# Patient Record
Sex: MALE | Race: WHITE | NOT HISPANIC OR LATINO | Employment: OTHER
[De-identification: names, ages, dates, MRNs, and addresses within clinical notes are randomized per-mention and may not be internally consistent; named-entity substitution may affect disease eponyms.]

---

## 2019-12-09 ENCOUNTER — HEALTH MAINTENANCE LETTER (OUTPATIENT)
Age: 69
End: 2019-12-09

## 2020-03-15 ENCOUNTER — HEALTH MAINTENANCE LETTER (OUTPATIENT)
Age: 70
End: 2020-03-15

## 2021-03-19 ENCOUNTER — IMMUNIZATION (OUTPATIENT)
Dept: NURSING | Facility: CLINIC | Age: 71
End: 2021-03-19
Payer: COMMERCIAL

## 2021-03-19 PROCEDURE — 0001A PR COVID VAC PFIZER DIL RECON 30 MCG/0.3 ML IM: CPT

## 2021-03-19 PROCEDURE — 91300 PR COVID VAC PFIZER DIL RECON 30 MCG/0.3 ML IM: CPT

## 2021-04-09 ENCOUNTER — IMMUNIZATION (OUTPATIENT)
Dept: NURSING | Facility: CLINIC | Age: 71
End: 2021-04-09
Attending: NURSE PRACTITIONER
Payer: COMMERCIAL

## 2021-04-09 PROCEDURE — 91300 PR COVID VAC PFIZER DIL RECON 30 MCG/0.3 ML IM: CPT

## 2021-04-09 PROCEDURE — 0002A PR COVID VAC PFIZER DIL RECON 30 MCG/0.3 ML IM: CPT

## 2022-09-30 ENCOUNTER — APPOINTMENT (OUTPATIENT)
Dept: CT IMAGING | Facility: CLINIC | Age: 72
End: 2022-09-30
Attending: EMERGENCY MEDICINE
Payer: COMMERCIAL

## 2022-09-30 ENCOUNTER — HOSPITAL ENCOUNTER (EMERGENCY)
Facility: CLINIC | Age: 72
Discharge: HOME | End: 2022-09-30
Attending: EMERGENCY MEDICINE | Admitting: EMERGENCY MEDICINE
Payer: COMMERCIAL

## 2022-09-30 VITALS
TEMPERATURE: 98.2 F | RESPIRATION RATE: 20 BRPM | HEART RATE: 96 BPM | DIASTOLIC BLOOD PRESSURE: 63 MMHG | WEIGHT: 139 LBS | OXYGEN SATURATION: 100 % | BODY MASS INDEX: 19.9 KG/M2 | SYSTOLIC BLOOD PRESSURE: 130 MMHG | HEIGHT: 70 IN

## 2022-09-30 DIAGNOSIS — D64.9 ANEMIA, UNSPECIFIED TYPE: ICD-10-CM

## 2022-09-30 DIAGNOSIS — D69.6 THROMBOCYTOPENIA (H): ICD-10-CM

## 2022-09-30 DIAGNOSIS — N28.9 RENAL INSUFFICIENCY: ICD-10-CM

## 2022-09-30 DIAGNOSIS — K56.41 FECAL IMPACTION IN RECTUM (H): ICD-10-CM

## 2022-09-30 DIAGNOSIS — N39.0 URINARY TRACT INFECTION WITHOUT HEMATURIA, SITE UNSPECIFIED: ICD-10-CM

## 2022-09-30 LAB
ALBUMIN SERPL-MCNC: 2.1 G/DL (ref 3.4–5)
ALBUMIN UR-MCNC: 50 MG/DL
ALP SERPL-CCNC: 165 U/L (ref 40–150)
ALT SERPL W P-5'-P-CCNC: 37 U/L (ref 0–70)
ANION GAP SERPL CALCULATED.3IONS-SCNC: 8 MMOL/L (ref 3–14)
APPEARANCE UR: ABNORMAL
AST SERPL W P-5'-P-CCNC: 38 U/L (ref 0–45)
ATRIAL RATE - MUSE: 97 BPM
BASOPHILS # BLD AUTO: 0.1 10E3/UL (ref 0–0.2)
BASOPHILS NFR BLD AUTO: 0 %
BILIRUB SERPL-MCNC: 0.7 MG/DL (ref 0.2–1.3)
BILIRUB UR QL STRIP: NEGATIVE
BUN SERPL-MCNC: 35 MG/DL (ref 7–30)
CALCIUM SERPL-MCNC: 8.9 MG/DL (ref 8.5–10.1)
CHLORIDE BLD-SCNC: 98 MMOL/L (ref 94–109)
CO2 SERPL-SCNC: 25 MMOL/L (ref 20–32)
COLOR UR AUTO: ABNORMAL
CREAT SERPL-MCNC: 1.52 MG/DL (ref 0.66–1.25)
DIASTOLIC BLOOD PRESSURE - MUSE: NORMAL MMHG
EOSINOPHIL # BLD AUTO: 0 10E3/UL (ref 0–0.7)
EOSINOPHIL NFR BLD AUTO: 0 %
ERYTHROCYTE [DISTWIDTH] IN BLOOD BY AUTOMATED COUNT: 14.5 % (ref 10–15)
GFR SERPL CREATININE-BSD FRML MDRD: 48 ML/MIN/1.73M2
GLUCOSE BLD-MCNC: 91 MG/DL (ref 70–99)
GLUCOSE UR STRIP-MCNC: NEGATIVE MG/DL
HCT VFR BLD AUTO: 31.1 % (ref 40–53)
HGB BLD-MCNC: 9.6 G/DL (ref 13.3–17.7)
HGB UR QL STRIP: ABNORMAL
HOLD SPECIMEN: NORMAL
IMM GRANULOCYTES # BLD: 0.2 10E3/UL
IMM GRANULOCYTES NFR BLD: 1 %
INR PPP: 1.2 (ref 0.85–1.15)
INTERPRETATION ECG - MUSE: NORMAL
KETONES UR STRIP-MCNC: NEGATIVE MG/DL
LEUKOCYTE ESTERASE UR QL STRIP: ABNORMAL
LYMPHOCYTES # BLD AUTO: 2.2 10E3/UL (ref 0.8–5.3)
LYMPHOCYTES NFR BLD AUTO: 11 %
MCH RBC QN AUTO: 27.1 PG (ref 26.5–33)
MCHC RBC AUTO-ENTMCNC: 30.9 G/DL (ref 31.5–36.5)
MCV RBC AUTO: 88 FL (ref 78–100)
MONOCYTES # BLD AUTO: 1.1 10E3/UL (ref 0–1.3)
MONOCYTES NFR BLD AUTO: 6 %
NEUTROPHILS # BLD AUTO: 15.6 10E3/UL (ref 1.6–8.3)
NEUTROPHILS NFR BLD AUTO: 82 %
NITRATE UR QL: NEGATIVE
NRBC # BLD AUTO: 0 10E3/UL
NRBC BLD AUTO-RTO: 0 /100
P AXIS - MUSE: 39 DEGREES
PH UR STRIP: 6 [PH] (ref 5–7)
PLATELET # BLD AUTO: 620 10E3/UL (ref 150–450)
POTASSIUM BLD-SCNC: 3.7 MMOL/L (ref 3.4–5.3)
PR INTERVAL - MUSE: 172 MS
PROT SERPL-MCNC: 8.2 G/DL (ref 6.8–8.8)
QRS DURATION - MUSE: 88 MS
QT - MUSE: 372 MS
QTC - MUSE: 472 MS
R AXIS - MUSE: 71 DEGREES
RBC # BLD AUTO: 3.54 10E6/UL (ref 4.4–5.9)
RBC URINE: 9 /HPF
SODIUM SERPL-SCNC: 131 MMOL/L (ref 133–144)
SP GR UR STRIP: 1.01 (ref 1–1.03)
SYSTOLIC BLOOD PRESSURE - MUSE: NORMAL MMHG
T AXIS - MUSE: 66 DEGREES
UROBILINOGEN UR STRIP-MCNC: NORMAL MG/DL
VENTRICULAR RATE- MUSE: 97 BPM
WBC # BLD AUTO: 19.2 10E3/UL (ref 4–11)
WBC CLUMPS #/AREA URNS HPF: PRESENT /HPF
WBC URINE: >182 /HPF

## 2022-09-30 PROCEDURE — 87106 FUNGI IDENTIFICATION YEAST: CPT | Performed by: EMERGENCY MEDICINE

## 2022-09-30 PROCEDURE — 250N000011 HC RX IP 250 OP 636: Performed by: EMERGENCY MEDICINE

## 2022-09-30 PROCEDURE — 36415 COLL VENOUS BLD VENIPUNCTURE: CPT | Performed by: EMERGENCY MEDICINE

## 2022-09-30 PROCEDURE — 80053 COMPREHEN METABOLIC PANEL: CPT | Performed by: EMERGENCY MEDICINE

## 2022-09-30 PROCEDURE — 85025 COMPLETE CBC W/AUTO DIFF WBC: CPT | Performed by: EMERGENCY MEDICINE

## 2022-09-30 PROCEDURE — 85610 PROTHROMBIN TIME: CPT | Performed by: EMERGENCY MEDICINE

## 2022-09-30 PROCEDURE — 82040 ASSAY OF SERUM ALBUMIN: CPT | Performed by: EMERGENCY MEDICINE

## 2022-09-30 PROCEDURE — 99285 EMERGENCY DEPT VISIT HI MDM: CPT | Mod: 25

## 2022-09-30 PROCEDURE — 250N000013 HC RX MED GY IP 250 OP 250 PS 637: Performed by: EMERGENCY MEDICINE

## 2022-09-30 PROCEDURE — 74176 CT ABD & PELVIS W/O CONTRAST: CPT

## 2022-09-30 PROCEDURE — 93005 ELECTROCARDIOGRAM TRACING: CPT

## 2022-09-30 PROCEDURE — 81001 URINALYSIS AUTO W/SCOPE: CPT | Performed by: EMERGENCY MEDICINE

## 2022-09-30 PROCEDURE — 96365 THER/PROPH/DIAG IV INF INIT: CPT

## 2022-09-30 PROCEDURE — 87086 URINE CULTURE/COLONY COUNT: CPT | Performed by: EMERGENCY MEDICINE

## 2022-09-30 RX ORDER — PANTOPRAZOLE SODIUM 40 MG/1
40 TABLET, DELAYED RELEASE ORAL DAILY
Qty: 30 TABLET | Refills: 0 | Status: SHIPPED | OUTPATIENT
Start: 2022-09-30 | End: 2022-10-30

## 2022-09-30 RX ORDER — CEFTRIAXONE 1 G/1
1 INJECTION, POWDER, FOR SOLUTION INTRAMUSCULAR; INTRAVENOUS ONCE
Status: COMPLETED | OUTPATIENT
Start: 2022-09-30 | End: 2022-09-30

## 2022-09-30 RX ORDER — CEFUROXIME AXETIL 500 MG/1
500 TABLET ORAL 2 TIMES DAILY
Qty: 20 TABLET | Refills: 0 | Status: SHIPPED | OUTPATIENT
Start: 2022-09-30 | End: 2022-10-22

## 2022-09-30 RX ADMIN — CEFTRIAXONE SODIUM 1 G: 1 INJECTION, POWDER, FOR SOLUTION INTRAMUSCULAR; INTRAVENOUS at 15:28

## 2022-09-30 RX ADMIN — DOCUSATE SODIUM 226 ML: 50 LIQUID ORAL at 15:08

## 2022-09-30 ASSESSMENT — ACTIVITIES OF DAILY LIVING (ADL)
ADLS_ACUITY_SCORE: 35
ADLS_ACUITY_SCORE: 35

## 2022-09-30 NOTE — ED PROVIDER NOTES
Visit Date: 09/30/2022    CHIEF COMPLAINT:  Low hemoglobin.    HISTORY OF PRESENT ILLNESS:  This is a 72-year-old male who presents to the ED on the advice of his primary care.  The patient has not been feeling good for the past couple of weeks.  He is noted to have some tarry-like stools, although when I asked him what these were like, he said mostly they were dark brown, they really were not black or shiny and there was no blood at any place.  He had a hemoglobin done by his primary care's office several weeks, if not months, ago, which was 12, and then when he saw her again today it was 9.  He denies any black or bloody stools.  He states he has been losing weight; however, in the past 6-8 months up to 20 pounds and he has also become a diabetic and requires insulin.  He is not having vomiting, but he is not taking much in.  He denies abdominal pain or discomfort.  He has noticed, though, that he seems to have a lot of loose stool as well as lots of difficulty controlling his urine and that seems to be new.  He has no back pain with this and he has no fever.    PAST MEDICAL HISTORY:  He has the diabetes, as noted; he has had cataracts.    MEDICATIONS:  Include Neurontin and aspirin.    ALLERGIES:  SULFA.    FAMILY AND SOCIAL HISTORY:  He does occasionally drink.  He does not smoke.    REVIEW OF SYSTEMS:  As noted with all other systems negative.    PHYSICAL EXAMINATION:    GENERAL:  Exam reveals a somewhat thin white male, supine, no respiratory distress.  VITAL SIGNS:  Blood pressure 100/50, pulse 94, temperature 98, respirations 20, pulse ox 100% on room air.  HEENT:  Pupils equal, reactive.  Extraocular movements intact.  Nares and oropharynx clear.  NECK:  The neck veins are flat.  LUNGS:  Clear to auscultation.  HEART:  Heart sounds are regular.  No murmur, rubs or gallops are appreciated.  ABDOMEN:  Soft, although the bladder does feel somewhat distended.  There is no guarding or rebound.  There are 2+  femoral pulses.  RECTAL:  Brown stool.  On digital exam, there is a large mass of stool in the rectum to the point of impaction.  There is reasonable tone.  I cannot appreciate the prostate.  MUSCULOSKELETAL:  No swelling or tenderness to the legs.  NEUROLOGIC:  Awake, alert and appropriate.  Fluent speech.  Normal motor sensation and coordination.  SKIN:  No rash.    EMERGENCY DEPARTMENT COURSE:  The patient was placed on monitors.  EKG was obtained, which shows sinus rhythm with premature atrial beats.  No acute ischemic changes, rate 97, , QRS 88 and QTc is 472.  Labs:  Sodium 131, BUN 35, creatinine 1.52, alkaline phosphatase 165.  White count 19, hemoglobin 9.6.  Platelet is 620.  Urine reveals white cell clumps, large leukocyte esterase, greater than 182 white cells.  The patient did go for a CT scan, which showed a moderate amount of stool with a large bolus in the rectum, some bilateral hydroureter and hydronephrosis with transition at the urinary bladder.  The bladder was thickened and, as noted, a small pulmonary nodule is also seen.  I did have the nurses do a bladder scan and they read over 500 and they put a catheter in and got a large amount of urine out.  In addition, the patient was given an enema and was able to pass a large amount of stool. Both his bladder and urine seem to be much improved than on arrival. Given the leukocytosis in the urine, Rocephin was given for probable infection.  The patient per labs does show renal insufficiency and anemia and, indeed, if this has gone from 12 to 9, this is concerning, although his MCV is 88.  The elevated platelet count could be consistent with GI bleeding or even malignancy.  The patient does not require transfusion and does not show any active rectal bleeding at this time.  He received IV fluids and he is able to drink water and ambulate.  I have discussed all these labs with him and he will need close followup.  We will start him on Ceftin 500  twice a day as well as Protonix 40 mg a day.  I have recommended followup with both his primary and with GI and have referred him to Dr. Salinas for possible upper GI endoscopy.    IMPRESSION:  Urinary tract infection, anemia, thrombocytopenia, renal insufficiency and rectal impaction.    Adithya Mclean MD        D: 2022   T: 2022   MT: [id number]    Name:     RONALD JONES  MRN:      [id number]        Account:    [id number]   :      1950           Visit Date: 2022     Document: [id number]

## 2022-09-30 NOTE — ED TRIAGE NOTES
Pt reports few weeks of tar like stools. Pt was told to come to ED for internal bleeding     Triage Assessment     Row Name 09/30/22 1023       Triage Assessment (Adult)    Airway WDL WDL       Respiratory WDL    Respiratory WDL WDL       Skin Circulation/Temperature WDL    Skin Circulation/Temperature WDL WDL       Cardiac WDL    Cardiac WDL WDL       Peripheral/Neurovascular WDL    Peripheral Neurovascular WDL WDL       Cognitive/Neuro/Behavioral WDL    Cognitive/Neuro/Behavioral WDL WDL

## 2022-09-30 NOTE — ED NOTES
"Pt is insulin dependent type II diabetic. Pt reports approx 2 weeks of dark loose stools that he states has been consistently \"leaking\". Pt was seen in clinic today and was told his hemoglobin was low and should come to the ED. Pt does state he feels a little weak today which he believes because he has only had a small amount of apple juice today.  Reports some intermittent abdominal discomfort with certain movements. Denies CP, SOB, nausea, vomiting, fevers.   "

## 2022-09-30 NOTE — ED NOTES
Pt ambulated with walker and did well. The patient feels better and does note that he could go home comfortably. No concerns during ambulation trial.

## 2022-10-02 LAB — BACTERIA UR CULT: ABNORMAL

## 2022-10-22 ENCOUNTER — HOSPITAL ENCOUNTER (OUTPATIENT)
Facility: CLINIC | Age: 72
Setting detail: OBSERVATION
Discharge: HOME | End: 2022-10-24
Attending: EMERGENCY MEDICINE | Admitting: EMERGENCY MEDICINE
Payer: COMMERCIAL

## 2022-10-22 DIAGNOSIS — T83.511A URINARY TRACT INFECTION ASSOCIATED WITH INDWELLING URETHRAL CATHETER, INITIAL ENCOUNTER (H): ICD-10-CM

## 2022-10-22 DIAGNOSIS — N39.0 URINARY TRACT INFECTION ASSOCIATED WITH INDWELLING URETHRAL CATHETER, INITIAL ENCOUNTER (H): ICD-10-CM

## 2022-10-22 DIAGNOSIS — N18.1 CRF (CHRONIC RENAL FAILURE), STAGE 1: ICD-10-CM

## 2022-10-22 DIAGNOSIS — Z46.6 URINARY CATHETER (FOLEY) CHANGE REQUIRED: ICD-10-CM

## 2022-10-22 DIAGNOSIS — D72.829 LEUKOCYTOSIS, UNSPECIFIED TYPE: ICD-10-CM

## 2022-10-22 DIAGNOSIS — R33.9 URINARY RETENTION WITH INCOMPLETE BLADDER EMPTYING: ICD-10-CM

## 2022-10-22 LAB
ALBUMIN UR-MCNC: 200 MG/DL
ANION GAP SERPL CALCULATED.3IONS-SCNC: 5 MMOL/L (ref 3–14)
APPEARANCE UR: ABNORMAL
BASOPHILS # BLD AUTO: 0.1 10E3/UL (ref 0–0.2)
BASOPHILS NFR BLD AUTO: 0 %
BILIRUB UR QL STRIP: NEGATIVE
BUN SERPL-MCNC: 35 MG/DL (ref 7–30)
CALCIUM SERPL-MCNC: 9.3 MG/DL (ref 8.5–10.1)
CHLORIDE BLD-SCNC: 100 MMOL/L (ref 94–109)
CO2 SERPL-SCNC: 27 MMOL/L (ref 20–32)
COLOR UR AUTO: ABNORMAL
CREAT SERPL-MCNC: 1.27 MG/DL (ref 0.66–1.25)
EOSINOPHIL # BLD AUTO: 0 10E3/UL (ref 0–0.7)
EOSINOPHIL NFR BLD AUTO: 0 %
ERYTHROCYTE [DISTWIDTH] IN BLOOD BY AUTOMATED COUNT: 16 % (ref 10–15)
GFR SERPL CREATININE-BSD FRML MDRD: 60 ML/MIN/1.73M2
GLUCOSE BLD-MCNC: 203 MG/DL (ref 70–99)
GLUCOSE BLDC GLUCOMTR-MCNC: 170 MG/DL (ref 70–99)
GLUCOSE BLDC GLUCOMTR-MCNC: 247 MG/DL (ref 70–99)
GLUCOSE BLDC GLUCOMTR-MCNC: 288 MG/DL (ref 70–99)
GLUCOSE UR STRIP-MCNC: 50 MG/DL
HBA1C MFR BLD: 6.6 % (ref 0–5.6)
HCT VFR BLD AUTO: 30.8 % (ref 40–53)
HGB BLD-MCNC: 10.2 G/DL (ref 13.3–17.7)
HGB UR QL STRIP: ABNORMAL
IMM GRANULOCYTES # BLD: 0.2 10E3/UL
IMM GRANULOCYTES NFR BLD: 1 %
KETONES UR STRIP-MCNC: NEGATIVE MG/DL
LEUKOCYTE ESTERASE UR QL STRIP: ABNORMAL
LYMPHOCYTES # BLD AUTO: 1.9 10E3/UL (ref 0.8–5.3)
LYMPHOCYTES NFR BLD AUTO: 8 %
MCH RBC QN AUTO: 28.3 PG (ref 26.5–33)
MCHC RBC AUTO-ENTMCNC: 33.1 G/DL (ref 31.5–36.5)
MCV RBC AUTO: 85 FL (ref 78–100)
MONOCYTES # BLD AUTO: 0.9 10E3/UL (ref 0–1.3)
MONOCYTES NFR BLD AUTO: 4 %
NEUTROPHILS # BLD AUTO: 21.6 10E3/UL (ref 1.6–8.3)
NEUTROPHILS NFR BLD AUTO: 87 %
NITRATE UR QL: NEGATIVE
NRBC # BLD AUTO: 0 10E3/UL
NRBC BLD AUTO-RTO: 0 /100
PH UR STRIP: 6 [PH] (ref 5–7)
PLATELET # BLD AUTO: 403 10E3/UL (ref 150–450)
POTASSIUM BLD-SCNC: 4.4 MMOL/L (ref 3.4–5.3)
RBC # BLD AUTO: 3.61 10E6/UL (ref 4.4–5.9)
RBC URINE: 23 /HPF
SARS-COV-2 RNA RESP QL NAA+PROBE: NEGATIVE
SODIUM SERPL-SCNC: 132 MMOL/L (ref 133–144)
SP GR UR STRIP: 1.01 (ref 1–1.03)
UROBILINOGEN UR STRIP-MCNC: NORMAL MG/DL
WBC # BLD AUTO: 24.6 10E3/UL (ref 4–11)
WBC CLUMPS #/AREA URNS HPF: PRESENT /HPF
WBC URINE: >182 /HPF

## 2022-10-22 PROCEDURE — 36415 COLL VENOUS BLD VENIPUNCTURE: CPT | Performed by: EMERGENCY MEDICINE

## 2022-10-22 PROCEDURE — 87086 URINE CULTURE/COLONY COUNT: CPT | Performed by: EMERGENCY MEDICINE

## 2022-10-22 PROCEDURE — 81001 URINALYSIS AUTO W/SCOPE: CPT | Performed by: EMERGENCY MEDICINE

## 2022-10-22 PROCEDURE — 96372 THER/PROPH/DIAG INJ SC/IM: CPT | Mod: 59

## 2022-10-22 PROCEDURE — 250N000013 HC RX MED GY IP 250 OP 250 PS 637: Performed by: HOSPITALIST

## 2022-10-22 PROCEDURE — 82310 ASSAY OF CALCIUM: CPT | Performed by: EMERGENCY MEDICINE

## 2022-10-22 PROCEDURE — 250N000011 HC RX IP 250 OP 636: Performed by: EMERGENCY MEDICINE

## 2022-10-22 PROCEDURE — 96361 HYDRATE IV INFUSION ADD-ON: CPT

## 2022-10-22 PROCEDURE — 51702 INSERT TEMP BLADDER CATH: CPT

## 2022-10-22 PROCEDURE — C9803 HOPD COVID-19 SPEC COLLECT: HCPCS

## 2022-10-22 PROCEDURE — U0005 INFEC AGEN DETEC AMPLI PROBE: HCPCS | Performed by: EMERGENCY MEDICINE

## 2022-10-22 PROCEDURE — 83036 HEMOGLOBIN GLYCOSYLATED A1C: CPT | Performed by: HOSPITALIST

## 2022-10-22 PROCEDURE — 96365 THER/PROPH/DIAG IV INF INIT: CPT

## 2022-10-22 PROCEDURE — 96376 TX/PRO/DX INJ SAME DRUG ADON: CPT

## 2022-10-22 PROCEDURE — 250N000011 HC RX IP 250 OP 636: Performed by: HOSPITALIST

## 2022-10-22 PROCEDURE — 120N000001 HC R&B MED SURG/OB

## 2022-10-22 PROCEDURE — 85025 COMPLETE CBC W/AUTO DIFF WBC: CPT | Performed by: EMERGENCY MEDICINE

## 2022-10-22 PROCEDURE — 99285 EMERGENCY DEPT VISIT HI MDM: CPT | Mod: 25

## 2022-10-22 PROCEDURE — 99223 1ST HOSP IP/OBS HIGH 75: CPT | Mod: AI | Performed by: HOSPITALIST

## 2022-10-22 PROCEDURE — 258N000003 HC RX IP 258 OP 636: Performed by: EMERGENCY MEDICINE

## 2022-10-22 RX ORDER — LIDOCAINE HYDROCHLORIDE 20 MG/ML
20 JELLY TOPICAL
Status: DISCONTINUED | OUTPATIENT
Start: 2022-10-22 | End: 2022-10-24 | Stop reason: HOSPADM

## 2022-10-22 RX ORDER — SODIUM CHLORIDE 9 MG/ML
INJECTION, SOLUTION INTRAVENOUS ONCE
Status: COMPLETED | OUTPATIENT
Start: 2022-10-22 | End: 2022-10-22

## 2022-10-22 RX ORDER — SODIUM CHLORIDE 9 MG/ML
INJECTION, SOLUTION INTRAVENOUS CONTINUOUS
Status: DISCONTINUED | OUTPATIENT
Start: 2022-10-22 | End: 2022-10-23

## 2022-10-22 RX ORDER — PROCHLORPERAZINE 25 MG
12.5 SUPPOSITORY, RECTAL RECTAL EVERY 12 HOURS PRN
Status: DISCONTINUED | OUTPATIENT
Start: 2022-10-22 | End: 2022-10-24 | Stop reason: HOSPADM

## 2022-10-22 RX ORDER — FERROUS SULFATE 325(65) MG
325 TABLET ORAL DAILY
Status: ON HOLD | COMMUNITY
End: 2023-02-08

## 2022-10-22 RX ORDER — AMOXICILLIN 250 MG
1 CAPSULE ORAL 2 TIMES DAILY
Status: DISCONTINUED | OUTPATIENT
Start: 2022-10-22 | End: 2022-10-24 | Stop reason: HOSPADM

## 2022-10-22 RX ORDER — NICOTINE POLACRILEX 4 MG
15-30 LOZENGE BUCCAL
Status: DISCONTINUED | OUTPATIENT
Start: 2022-10-22 | End: 2022-10-24 | Stop reason: HOSPADM

## 2022-10-22 RX ORDER — ONDANSETRON 4 MG/1
4 TABLET, ORALLY DISINTEGRATING ORAL EVERY 6 HOURS PRN
Status: DISCONTINUED | OUTPATIENT
Start: 2022-10-22 | End: 2022-10-24 | Stop reason: HOSPADM

## 2022-10-22 RX ORDER — PROCHLORPERAZINE MALEATE 5 MG
5 TABLET ORAL EVERY 6 HOURS PRN
Status: DISCONTINUED | OUTPATIENT
Start: 2022-10-22 | End: 2022-10-24 | Stop reason: HOSPADM

## 2022-10-22 RX ORDER — FERROUS SULFATE 325(65) MG
325 TABLET ORAL DAILY
Status: DISCONTINUED | OUTPATIENT
Start: 2022-10-23 | End: 2022-10-24 | Stop reason: HOSPADM

## 2022-10-22 RX ORDER — POLYETHYLENE GLYCOL 3350 17 G/17G
17 POWDER, FOR SOLUTION ORAL DAILY PRN
Status: DISCONTINUED | OUTPATIENT
Start: 2022-10-22 | End: 2022-10-24 | Stop reason: HOSPADM

## 2022-10-22 RX ORDER — PIPERACILLIN SODIUM, TAZOBACTAM SODIUM 3; .375 G/15ML; G/15ML
3.38 INJECTION, POWDER, LYOPHILIZED, FOR SOLUTION INTRAVENOUS EVERY 6 HOURS
Status: DISCONTINUED | OUTPATIENT
Start: 2022-10-22 | End: 2022-10-24

## 2022-10-22 RX ORDER — PIPERACILLIN SODIUM, TAZOBACTAM SODIUM 3; .375 G/15ML; G/15ML
3.38 INJECTION, POWDER, LYOPHILIZED, FOR SOLUTION INTRAVENOUS ONCE
Status: COMPLETED | OUTPATIENT
Start: 2022-10-22 | End: 2022-10-22

## 2022-10-22 RX ORDER — TAMSULOSIN HYDROCHLORIDE 0.4 MG/1
0.4 CAPSULE ORAL DAILY
Status: DISCONTINUED | OUTPATIENT
Start: 2022-10-23 | End: 2022-10-24 | Stop reason: HOSPADM

## 2022-10-22 RX ORDER — ACETAMINOPHEN 650 MG/1
650 SUPPOSITORY RECTAL EVERY 6 HOURS PRN
Status: DISCONTINUED | OUTPATIENT
Start: 2022-10-22 | End: 2022-10-24 | Stop reason: HOSPADM

## 2022-10-22 RX ORDER — LIDOCAINE 40 MG/G
CREAM TOPICAL
Status: DISCONTINUED | OUTPATIENT
Start: 2022-10-22 | End: 2022-10-24 | Stop reason: HOSPADM

## 2022-10-22 RX ORDER — METFORMIN HCL 500 MG
TABLET, EXTENDED RELEASE 24 HR ORAL
COMMUNITY

## 2022-10-22 RX ORDER — ONDANSETRON 2 MG/ML
4 INJECTION INTRAMUSCULAR; INTRAVENOUS EVERY 6 HOURS PRN
Status: DISCONTINUED | OUTPATIENT
Start: 2022-10-22 | End: 2022-10-24 | Stop reason: HOSPADM

## 2022-10-22 RX ORDER — AMOXICILLIN 250 MG
2 CAPSULE ORAL 2 TIMES DAILY
Status: DISCONTINUED | OUTPATIENT
Start: 2022-10-22 | End: 2022-10-24 | Stop reason: HOSPADM

## 2022-10-22 RX ORDER — PANTOPRAZOLE SODIUM 40 MG/1
40 TABLET, DELAYED RELEASE ORAL DAILY
Status: DISCONTINUED | OUTPATIENT
Start: 2022-10-22 | End: 2022-10-24 | Stop reason: HOSPADM

## 2022-10-22 RX ORDER — DEXTROSE MONOHYDRATE 25 G/50ML
25-50 INJECTION, SOLUTION INTRAVENOUS
Status: DISCONTINUED | OUTPATIENT
Start: 2022-10-22 | End: 2022-10-24 | Stop reason: HOSPADM

## 2022-10-22 RX ORDER — CIPROFLOXACIN 500 MG/1
500 TABLET, FILM COATED ORAL ONCE
Status: DISCONTINUED | OUTPATIENT
Start: 2022-10-22 | End: 2022-10-22

## 2022-10-22 RX ORDER — ACETAMINOPHEN 325 MG/1
650 TABLET ORAL EVERY 6 HOURS PRN
Status: DISCONTINUED | OUTPATIENT
Start: 2022-10-22 | End: 2022-10-24 | Stop reason: HOSPADM

## 2022-10-22 RX ADMIN — PIPERACILLIN AND TAZOBACTAM 3.38 G: 3; .375 INJECTION, POWDER, FOR SOLUTION INTRAVENOUS at 20:35

## 2022-10-22 RX ADMIN — SENNOSIDES AND DOCUSATE SODIUM 1 TABLET: 50; 8.6 TABLET ORAL at 20:37

## 2022-10-22 RX ADMIN — PIPERACILLIN AND TAZOBACTAM 3.38 G: 3; .375 INJECTION, POWDER, FOR SOLUTION INTRAVENOUS at 14:49

## 2022-10-22 RX ADMIN — SODIUM CHLORIDE: 9 INJECTION, SOLUTION INTRAVENOUS at 13:58

## 2022-10-22 RX ADMIN — PANTOPRAZOLE SODIUM 40 MG: 40 TABLET, DELAYED RELEASE ORAL at 18:34

## 2022-10-22 ASSESSMENT — ENCOUNTER SYMPTOMS
VOMITING: 0
FEVER: 0
BACK PAIN: 0
ABDOMINAL PAIN: 1
FLANK PAIN: 0

## 2022-10-22 ASSESSMENT — ACTIVITIES OF DAILY LIVING (ADL)
ADLS_ACUITY_SCORE: 35
ADLS_ACUITY_SCORE: 38
ADLS_ACUITY_SCORE: 35
ADLS_ACUITY_SCORE: 38

## 2022-10-22 NOTE — ED TRIAGE NOTES
"  Sept 30 here had suh placed for retention, ,took  out here ,  followed with urologist a week later, cath changed at that time and told he would have it for a month  , UTI 3 courses of cephalexin, still has sx, nothing bag during the night, changed to leg bag, didn't sleep, urgency feeling all night , now having urine in leg bag , had 4 \" forced\" BM today because hx constipation with the retention. Better now that urine is coming out .       "

## 2022-10-22 NOTE — ED PROVIDER NOTES
"  History   Chief Complaint:  Urgency & Retention     HPI   [place] is a 72 year old male with history of type 2 diabetes who presents with increased urinary retention, a UTI, and lower abdominal pain after a catheter was placed on the 30th before having it removed and changed after a follow-up with a urologist one week later. The patient states that the night before last night he noticed that after putting his night bag on and going to bed, there was no urine output when he got up again. He decided the issue might resolve with the leg bag on instead but was unable to sleep at all due to his \"sharp\" urge to urinate. He felt his bladder was full but that nothing was draining. He also notes that he \"forced\" a couple of bowel movements to see if there would be any improvement, but states that there was none. This morning around 4 hours ago at 8am he called a friend to bring him in. However, since presentation the patient notes that he is able to have some urine output. He has been on antibiotics for his UTI with 3 different rounds of Cephalexin. The patient does not have a history of kidney stones.    Review of Systems   Constitutional: Negative for fever.   Gastrointestinal: Positive for abdominal pain (low). Negative for vomiting.   Genitourinary: Positive for urgency. Negative for flank pain and testicular pain.        (+) urinary retention   Musculoskeletal: Negative for back pain.   All other systems reviewed and are negative.    Allergies:  Sulfa drugs    Medications:  Aspirin  Cefuroxime  Gabapentin  Hydrocodone-acetaminophen  Pantoprazole  Linagliptin  Bisacodyl  Metformin  Insulin glargine  Ondansetron  Cephalexin    Past Medical History:     Iron deficiency anemia  Type 2 diabetes mellitus    Past Surgical History:    Bilateral cataracts    Family History:    Eye disorder  Alzheimer disease  Diabetes  Colorectal cancer    Social History:  The patient presents to the ED on his own.  PCP: Juan David" Heidi MICHELLE     Physical Exam     Patient Vitals for the past 24 hrs:   BP Temp Temp src Pulse Resp SpO2   10/22/22 1340 131/61 -- -- 95 -- --   10/22/22 1320 126/57 -- -- 87 -- 99 %   10/22/22 1300 117/55 -- -- 88 -- 99 %   10/22/22 1240 116/58 -- -- -- -- 100 %   10/22/22 1220 138/58 -- -- 92 -- 100 %   10/22/22 1200 115/68 -- -- -- -- --   10/22/22 0856 112/49 99.1  F (37.3  C) Temporal 97 16 98 %       Physical Exam  Nursing note and vitals reviewed.  Constitutional:  Appears well-developed and well-nourished.   HENT:   Head:    Atraumatic.   Mouth/Throat:   Oropharynx is clear and moist. No oropharyngeal exudate.   Eyes:    Pupils are equal, round, and reactive to light.   Neck:    Normal range of motion. Neck supple.      No tracheal deviation present. No thyromegaly present.   Cardiovascular:  Normal rate, regular rhythm, no murmur   Pulmonary/Chest: Breath sounds are clear and equal without wheezes or crackles.  Abdominal:   Soft. Bowel sounds are normal. Exhibits no distension and      no mass. Suprapubic tenderness and fullness.      There is no rebound and no guarding.   Back:   No CVA tenderness.  :   Penis and scrotal appear normal. Henderson catheter in place in the penis.   Musculoskeletal:  Exhibits no edema.   Lymphadenopathy:  No cervical adenopathy.   Neurological:   Alert and oriented to person, place, and time.   Skin:    Skin is warm and dry. No rash noted. No pallor.       Emergency Department Course     Laboratory:  Labs Ordered and Resulted from Time of ED Arrival to Time of ED Departure   BASIC METABOLIC PANEL - Abnormal       Result Value    Sodium 132 (*)     Potassium 4.4      Chloride 100      Carbon Dioxide (CO2) 27      Anion Gap 5      Urea Nitrogen 35 (*)     Creatinine 1.27 (*)     Calcium 9.3      Glucose 203 (*)     GFR Estimate 60 (*)    ROUTINE UA WITH MICROSCOPIC REFLEX TO CULTURE - Abnormal    Color Urine Dark Brown (*)     Appearance Urine Cloudy (*)     Glucose Urine 50 (*)      Bilirubin Urine Negative      Ketones Urine Negative      Specific Gravity Urine 1.011      Blood Urine Moderate (*)     pH Urine 6.0      Protein Albumin Urine 200 (*)     Urobilinogen Urine Normal      Nitrite Urine Negative      Leukocyte Esterase Urine Large (*)     WBC Clumps Urine Present (*)     RBC Urine 23 (*)     WBC Urine >182 (*)    CBC WITH PLATELETS AND DIFFERENTIAL - Abnormal    WBC Count 24.6 (*)     RBC Count 3.61 (*)     Hemoglobin 10.2 (*)     Hematocrit 30.8 (*)     MCV 85      MCH 28.3      MCHC 33.1      RDW 16.0 (*)     Platelet Count 403      % Neutrophils 87      % Lymphocytes 8      % Monocytes 4      % Eosinophils 0      % Basophils 0      % Immature Granulocytes 1      NRBCs per 100 WBC 0      Absolute Neutrophils 21.6 (*)     Absolute Lymphocytes 1.9      Absolute Monocytes 0.9      Absolute Eosinophils 0.0      Absolute Basophils 0.1      Absolute Immature Granulocytes 0.2      Absolute NRBCs 0.0     COVID-19 VIRUS (CORONAVIRUS) BY PCR   URINE CULTURE        Procedures  Henderson catheter removal and placement of a new Henderson catheter by the nurse with copious amounts of thick whitish-gray milky pus urine return.    Emergency Department Course:     Reviewed:  I reviewed nursing notes, vitals, past medical history and Care Everywhere    Assessments:  1154 I obtained history and examined the patient as noted above.   1327 I rechecked the patient and explained findings.     Consults:  1350 I consulted with Dr. Salguero, hospitalist, regarding the patient's history and presentation here in the emergency department who accepted the patient for admission.    Interventions:  1345 Zosyn 3.375 IV    Disposition:  The patient was admitted to the hospital under the care of Dr. Salguero.     Impression & Plan     Medical Decision Making:  I found this patient to have a catheter associated urinary tract infection which has been refractory to outpatient antibiotics with multiple rounds of antibiotics (most  recently Ceftin and cephalexin).  He arrives because of urinary retention from a blocked Henderson catheter which was removed and replaced with a new catheter with gross pus urine return, therefore he was given IV Zosyn and his urine is cultured.  He is not appearing septic or toxic at this time.  His white blood cell count is significantly elevated however he has a history of leukocytosis but his white blood cell count today is more elevated than it has been in the recent past which is concerning.  There was no sign of ureteral calculus and his obstruction appears to be resolved with the new Henderson catheter placement.  I do not feel CT imaging was indicated at this time.  The patient was admitted to the care of the hospitalist will consult urology.    Diagnosis:    ICD-10-CM    1. Urinary tract infection associated with indwelling urethral catheter, initial encounter (H)  T83.511A     N39.0       2. Urinary retention with incomplete bladder emptying  R33.9       3. Urinary catheter (Henderson) change required  Z46.6       4. Leukocytosis, unspecified type  D72.829       5. CRF (chronic renal failure), stage 1  N18.1           Scribe Disclosure:  Beatriz GROSSed, am serving as a scribe at 11:51 AM on 10/22/2022 to document services personally performed by Celina Lira MD based on my observations and the provider's statements to me.          Celina Lira MD  10/22/22 1631       Celina Lira MD  10/22/22 1632       Celina Lira MD  10/22/22 1635

## 2022-10-22 NOTE — PHARMACY-ADMISSION MEDICATION HISTORY
Pharmacy Medication History  Admission medication history interview status for the 10/22/2022  admission is complete. See EPIC admission navigator for prior to admission medications     Location of Interview: Patient room  Medication history sources: Patient    Significant changes made to the medication list:  Removed aspirin, cefuroxime, gabapentin and hydrocodone-acetaminophen.  Added Lantus, Tradjenta, Metformin and Iron.    In the past week, patient estimated taking medication this percent of the time: 50-90% due to other    Additional medication history information:   Patient reports taking Lantus in the morning.    Patient reports taking Metformin 1 tablet at lunch and 1 tablet at dinner.    Medication reconciliation completed by provider prior to medication history? No    Time spent in this activity: 15 minutes    Prior to Admission medications    Medication Sig Last Dose Taking? Auth Provider Long Term End Date   ferrous sulfate (FEROSUL) 325 (65 Fe) MG tablet Take 325 mg by mouth daily 10/21/2022 Yes Unknown, Entered By History     insulin glargine (LANTUS PEN) 100 UNIT/ML pen Inject 20 Units Subcutaneous every morning 10/21/2022 at am Yes Unknown, Entered By History     linagliptin (TRADJENTA) 5 MG TABS tablet Take 5 mg by mouth daily 10/22/2022 at am Yes Unknown, Entered By History Yes    metFORMIN (GLUCOPHAGE XR) 500 MG 24 hr tablet Take 500 mg by mouth 2 times daily 10/21/2022 Yes Unknown, Entered By History Yes    pantoprazole (PROTONIX) 40 MG EC tablet Take 1 tablet (40 mg) by mouth daily for 30 doses 10/22/2022 at am Yes Adithya Mclean MD  10/30/22   ONE TOUCH ULTRA TEST VI STRP as directed    Michael Joe MD         The information provided in this note is only as accurate as the sources available at the time of update(s)

## 2022-10-22 NOTE — PROGRESS NOTES
RECEIVING UNIT ED HANDOFF REVIEW    ED Nurse Handoff Report was reviewed by: Lourdes Rayo RN on October 22, 2022 at 5:23 PM

## 2022-10-22 NOTE — H&P
[facility]    History and Physical - Hospitalist Service       Date of Admission:  10/22/2022    Assessment & Plan      [place] is a 72 year old male with a history of diabetes and hyperlipidemia who presented to the ER with urinary retention.  In the ER, his Henderson catheter was replaced and subsequently a significant amount of purulent appearing fluid drained through the catheter.  UA was significantly abnormal and his white count was elevated.  He was started on IV Zosyn for urinary tract infection.  The hospitalist service was contacted to admit him for further evaluation management.    Complicated urinary tract infection, catheter associated, present on admission  Urinary retention  Initially had symptoms in August and was diagnosed with a UTI and completed a course of cephalexin.  Also had uncontrolled diabetes at that time and some of his urinary frequency may have been related to that.  Was also diagnosed with a UTI on 9/30 and 10/12 and completed courses of cephalosporins on each of those occasions as well.  Urine culture on 9/30/2022 grew less than 10,000 CFU/mL of Candida albicans.  Urine culture on 10/12/2022 had no growth.  CT abdomen/pelvis on 9/30/2022 showed bilateral diffuse hydroureter/hydronephrosis with transition at the level of the urinary bladder; urinary bladder is significantly distended with significant heterogeneous wall thickening predominantly of the superior portion of the urinary bladder, cannot exclude urinary bladder malignancy, other differential consideration including chronic cystitis and chronic bladder outlet obstruction.  [facility], he was started on Flomax and recommended to continue catheterization with outpatient follow-up in 1 month, see notes in care everywhere for details.  UA in the ER on 10/20/2022 suggestive of UTI with large leukocyte esterase, WBC clumps, moderate blood, 23 RBCs.  Noted to have  leukocytosis with white blood cell count of 24.6.  Creatinine slightly elevated but not enough to meet criteria for TSEVE.    Admit to inpatient.    Started on empiric Zosyn in the ER, will continue the same for now, while awaiting urine culture results.  Using Zosyn as he has previously completed 3 courses of cephalosporins, and continues to have abnormal UA suggestive of UTI.  Urine culture has not grown any bacteria on 2 prior occasions.    Consult urology, appreciate their assistance.    Considered imaging, however will defer to Urology.    Henderson catheter was replaced in the ER, continue catheter for now.    Resume Flomax when verified by pharmacy.    Diabetes mellitus, type 2  He felt this was diet controlled for quite some time and had not had any follow-up.  Establish care with primary care provider on 8/19/2022 and had a hemoglobin A1c of 12.5.  He has been started on Lantus, Tradjenta, and metformin since then.    Hold PTA Tradjenta and metformin for now.    Resume PTA Lantus at 10 units daily (this is half his home dose of 20 units daily).    Monitor blood sugars before meals and at bedtime and use medium dose sliding scale NovoLog as indicated.    Monitor for hypoglycemia.    Hyponatremia  Mild, asymptomatic.  Sodium 132 in the ER.  Suspect this is hypovolemic.    IV fluids.    Recheck in AM.    Anemia  He was noted to be anemic with a hemoglobin of 9.6 on 69/30/22 when he was seen in the ER.  Iron studies were suggestive of anemia of chronic disease.  He has not noted any melena or hematochezia.  When he was seen in the ER at the end of September arrangements were made for outpatient follow-up with GI.    Hemoglobin stable at 10.2 on 10/22/2022.    Recheck labs in AM.    Resume Protonix when verified by pharmacy.    Has an appointment with Rita NDIAYE on Wednesday, 10/26/2022, for upper and lower endoscopy for further evaluation of his anemia.    Pulmonary nodules  CT abdomen/pelvis on 9/30/2022 showed  multiple nodular pulmonary opacity predominantly in the right middle lobe measuring 7 mm.    This was noted on a CT abdomen/pelvis, likely needs CT chest for further evaluation after he recovers from his acute illness.    Weight loss  Weight is down about 20-25 pounds since June of this year.    Could be related to uncontrolled diabetes, which is now being treated.    Has follow-up with GI for upper/lower endoscopy.    Could have bladder malignancy as noted above.    Needs follow-up on pulmonary nodules and follow-up with PCP as well.    COVID-19 PCR negative    Routine admission COVID-19 PCR testing on 10/22/2022 was negative.       Diet: Moderate Consistent Carb (60 g CHO per Meal) Diet    DVT Prophylaxis: Pneumatic Compression Devices  Henderson Catheter: Not present  Central Lines: None  Cardiac Monitoring: None  Code Status:   FULL CODE    Clinically Significant Risk Factors Present on Admission         # Hyponatremia: Lowest Na = 132 mmol/L (Ref range: 136-145) in last 2 days, will monitor as appropriate             # DMII: A1C = 6.6 % (Ref range: 0.0 - 5.6 %) within past 3 months            Disposition Plan      Expected Discharge Date: 10/24/2022                The patient's care was discussed with the Bedside Nurse and Patient.    Genaro Salguero MD  Hospitalist Service  [facility]  Securely message with the [employer] Web Console (learn more here)  Text page via [employer] Paging/Directory         ______________________________________________________________________    Chief Complaint   Urinary retention    History is obtained from the patient    History of Present Illness   [place] is a 72 year old male with a history of diabetes and hyperlipidemia who presented to the ER with urinary retention.  He was seen in the [facility] ER at the end of September and was found to have urinary retention and UTI.  A Henderson catheter was placed and he was discharged home with a course of  antibiotics.  Subsequently followed up with urology at [facility] on 10/12/2022.  There is again concern for bladder infection he was given a course of antibiotics.  There was plan for further work-up in the future after he recovered from his infection.  He had been doing okay at home up until the night before last when he noticed that the output from his catheter basically stopped.  He did not have any output from the catheter yesterday or last night.  He did try switching to the leg bag today and felt like he had a small amount of output with that.  He has had some lower abdominal pain associated with this.  Denies any fevers, chills, sweats.  No flank pain.  Due to the lack of output from his catheter he came into the ER today for evaluation.    In the ER he was evaluated by Dr. Lira.  Vitals showed mildly elevated temperature and pulse.  Labs showed mild hyponatremia, slight elevation in creatinine from baseline, leukocytosis, mild anemia.  UA was significantly abnormal with large leukocyte esterase, WBC clumps, moderate blood, 23 RBCs, 200 protein.  Urine culture was obtained.  His Henderson catheter was removed and a new catheter was placed.  Per report, a significant amount of purulent appearing fluid drained through the new catheter right after it was placed.  He was started on IV Zosyn due to concern for urinary tract infection.  The hospitalist service was contacted to admit him for further evaluation and management.    He has not been seeing a doctor regularly for several years, but established with a primary care provider in August 2022.  He had noted some urinary frequency and weight loss at that time.  Weight was down about 20 pounds from earlier in the year.  He was diagnosed with a UTI at that time and was given a course of Keflex.  He had a known history of diabetes that was assumed to be diet controlled and he had actually stopped monitoring his sugars, however he was noted to have significantly  elevated blood sugars at the visit with his primary care provider and subsequent follow-up visits.  He has since been started on Lantus, Tradjenta, and metformin.  Blood sugars have generally been less than 200 recently, sometimes in the 80s and 90s in the morning when he first wakes up.    He denies any chest pain, shortness of breath, nausea.  Was constipated earlier this month, however this has improved.  No neurologic complaints.  He denies any significant medical history beyond the diabetes.  Denies any prior history of surgery, although it appears he had a cataract surgery about 25 years ago.  Former smoker, quit about 10 years ago.  Does not drink alcohol currently.  Allergic to sulfa drugs, denies any other allergies.  His father had both colon and lung cancer.  Denies any family history of bladder cancer.    Review of Systems    The 10 point Review of Systems is negative other than noted in the HPI or here.    Past Medical History    I have reviewed this patient's medical history and updated it with pertinent information if needed.   Past Medical History:   Diagnosis Date     Other and unspecified hyperlipidemia      Type II or unspecified type diabetes mellitus without mention of complication, not stated as uncontrolled     diet controlled     Past Surgical History   I have reviewed this patient's surgical history and updated it with pertinent information if needed.  Past Surgical History:   Procedure Laterality Date     CATARACT IOL, RT/LT  1997    bilaterally     Social History   I have reviewed this patient's social history and updated it with pertinent information if needed.  Social History     Tobacco Use     Smoking status: Former     Types: Cigarettes     Smokeless tobacco: Never   Substance Use Topics     Alcohol use: Not Currently     Comment: few drinks a year     Drug use: No     Family History   I have reviewed this patient's family history and updated it with pertinent information if  needed.  Family History   Problem Relation Age of Onset     Eye Disorder Mother      Alzheimer Disease Mother      Diabetes Father      Cancer - colorectal Father      Lung Cancer Father      Cancer Maternal Grandmother         kidney     Cancer Maternal Grandfather         lung cancer-smoker and      Cancer Paternal Grandmother         leukemia     Unknown/Adopted Paternal Grandfather      Prior to Admission Medications   Prior to Admission Medications   Prescriptions Last Dose Informant Patient Reported? Taking?   ONE TOUCH ULTRA TEST VI STRP   No No   Sig: as directed    ferrous sulfate (FEROSUL) 325 (65 Fe) MG tablet 10/21/2022  Yes Yes   Sig: Take 325 mg by mouth daily   insulin glargine (LANTUS PEN) 100 UNIT/ML pen 10/21/2022 at am  Yes Yes   Sig: Inject 20 Units Subcutaneous every morning   linagliptin (TRADJENTA) 5 MG TABS tablet 10/22/2022 at am  Yes Yes   Sig: Take 5 mg by mouth daily   metFORMIN (GLUCOPHAGE XR) 500 MG 24 hr tablet 10/21/2022  Yes Yes   Sig: Take 500 mg by mouth 2 times daily   pantoprazole (PROTONIX) 40 MG EC tablet 10/22/2022 at am  No Yes   Sig: Take 1 tablet (40 mg) by mouth daily for 30 doses      Facility-Administered Medications: None     Allergies   Allergies   Allergen Reactions     Sulfa Drugs Rash     Physical Exam   Vital Signs: Temp: 99.1  F (37.3  C) Temp src: Temporal BP: 127/67 Pulse: 95   Resp: 16 SpO2: 99 %      Weight: 0 lbs 0 oz    Constitutional: awake, alert, cooperative, no apparent distress, laying in the hospital bed, cachectic  Eyes: sclera clear, conjunctiva normal  ENT: oral pharynx with moist mucous membranes  Respiratory: no increased work of breathing, clear to auscultation bilaterally, no crackles or wheezing  Cardiovascular: regular rate and rhythm, normal S1 and S2, no murmur noted  GI: normal bowel sounds, soft, non-distended, mild suprapubic tenderness  : Henderson catheter in place with yellowish/orange milky appearing fluid  Skin: warm,  dry  Musculoskeletal: no lower extremity pitting edema present  Neurologic: awake, alert, answers questions appropriately, moves all extremities    Data   Data reviewed today: I reviewed all medications, new labs and imaging results over the last 24 hours. I personally reviewed no images or EKG's today.    Recent Labs   Lab 10/22/22  1442 10/22/22  1243   WBC  --  24.6*   HGB  --  10.2*   MCV  --  85   PLT  --  403   NA  --  132*   POTASSIUM  --  4.4   CHLORIDE  --  100   CO2  --  27   BUN  --  35*   CR  --  1.27*   ANIONGAP  --  5   MARNI  --  9.3   * 203*

## 2022-10-22 NOTE — PROGRESS NOTES
Urology brief note:      Chart and imaging reviewed from [facility].  Had retention on CT scan on 9/30 and catheter was placed, home with it, saw [facility] Dr Reis about 10 days ago and catheter replaced then, was set up for urodynamic study there which is pending.  Today catheter not draining well at home, seen in ED again and catheter replaced with good output.  Has likely UTI with WBC 24.6K, creat OK at 1.27 UC sent and started on zosyn.    A/P:  UTI, urinary retention.  Zosyn pending culture results.  Follow urine output/serial labs for creat and WBC.  Home with suh when improved and follow up with [facility] urology for Urodynamic study and possible TURP if detrusor function OK.      Matthias Walker MD, MD

## 2022-10-22 NOTE — ED NOTES
Urinary catheter replaced per MD request. Patient had significant discharge from around the catheter. the  Liquid coming out was a smoothie like consistency. Redness around the penis.

## 2022-10-23 LAB
ANION GAP SERPL CALCULATED.3IONS-SCNC: 6 MMOL/L (ref 3–14)
BUN SERPL-MCNC: 25 MG/DL (ref 7–30)
CALCIUM SERPL-MCNC: 8.6 MG/DL (ref 8.5–10.1)
CHLORIDE BLD-SCNC: 103 MMOL/L (ref 94–109)
CO2 SERPL-SCNC: 25 MMOL/L (ref 20–32)
CREAT SERPL-MCNC: 0.94 MG/DL (ref 0.66–1.25)
ERYTHROCYTE [DISTWIDTH] IN BLOOD BY AUTOMATED COUNT: 16.3 % (ref 10–15)
GFR SERPL CREATININE-BSD FRML MDRD: 86 ML/MIN/1.73M2
GLUCOSE BLD-MCNC: 168 MG/DL (ref 70–99)
GLUCOSE BLDC GLUCOMTR-MCNC: 169 MG/DL (ref 70–99)
GLUCOSE BLDC GLUCOMTR-MCNC: 172 MG/DL (ref 70–99)
GLUCOSE BLDC GLUCOMTR-MCNC: 175 MG/DL (ref 70–99)
GLUCOSE BLDC GLUCOMTR-MCNC: 178 MG/DL (ref 70–99)
GLUCOSE BLDC GLUCOMTR-MCNC: 182 MG/DL (ref 70–99)
HCT VFR BLD AUTO: 26.3 % (ref 40–53)
HGB BLD-MCNC: 8.5 G/DL (ref 13.3–17.7)
MCH RBC QN AUTO: 28.7 PG (ref 26.5–33)
MCHC RBC AUTO-ENTMCNC: 32.3 G/DL (ref 31.5–36.5)
MCV RBC AUTO: 89 FL (ref 78–100)
PLATELET # BLD AUTO: 320 10E3/UL (ref 150–450)
POTASSIUM BLD-SCNC: 3.3 MMOL/L (ref 3.4–5.3)
POTASSIUM BLD-SCNC: 3.6 MMOL/L (ref 3.4–5.3)
RBC # BLD AUTO: 2.96 10E6/UL (ref 4.4–5.9)
SODIUM SERPL-SCNC: 134 MMOL/L (ref 133–144)
WBC # BLD AUTO: 24.6 10E3/UL (ref 4–11)

## 2022-10-23 PROCEDURE — 258N000003 HC RX IP 258 OP 636: Performed by: HOSPITALIST

## 2022-10-23 PROCEDURE — 84132 ASSAY OF SERUM POTASSIUM: CPT | Performed by: INTERNAL MEDICINE

## 2022-10-23 PROCEDURE — 250N000013 HC RX MED GY IP 250 OP 250 PS 637: Performed by: HOSPITALIST

## 2022-10-23 PROCEDURE — 250N000012 HC RX MED GY IP 250 OP 636 PS 637: Performed by: HOSPITALIST

## 2022-10-23 PROCEDURE — 82962 GLUCOSE BLOOD TEST: CPT

## 2022-10-23 PROCEDURE — 99225 PR SUBSEQUENT OBSERVATION CARE,LEVEL II: CPT | Performed by: INTERNAL MEDICINE

## 2022-10-23 PROCEDURE — 96372 THER/PROPH/DIAG INJ SC/IM: CPT | Performed by: HOSPITALIST

## 2022-10-23 PROCEDURE — 36415 COLL VENOUS BLD VENIPUNCTURE: CPT | Performed by: HOSPITALIST

## 2022-10-23 PROCEDURE — 36415 COLL VENOUS BLD VENIPUNCTURE: CPT | Performed by: INTERNAL MEDICINE

## 2022-10-23 PROCEDURE — 80048 BASIC METABOLIC PNL TOTAL CA: CPT | Performed by: HOSPITALIST

## 2022-10-23 PROCEDURE — 96361 HYDRATE IV INFUSION ADD-ON: CPT

## 2022-10-23 PROCEDURE — 250N000011 HC RX IP 250 OP 636: Performed by: HOSPITALIST

## 2022-10-23 PROCEDURE — G0378 HOSPITAL OBSERVATION PER HR: HCPCS

## 2022-10-23 PROCEDURE — 96376 TX/PRO/DX INJ SAME DRUG ADON: CPT

## 2022-10-23 PROCEDURE — 250N000013 HC RX MED GY IP 250 OP 250 PS 637: Performed by: INTERNAL MEDICINE

## 2022-10-23 PROCEDURE — 85027 COMPLETE CBC AUTOMATED: CPT | Performed by: HOSPITALIST

## 2022-10-23 RX ORDER — POTASSIUM CHLORIDE 1500 MG/1
40 TABLET, EXTENDED RELEASE ORAL ONCE
Status: COMPLETED | OUTPATIENT
Start: 2022-10-23 | End: 2022-10-23

## 2022-10-23 RX ADMIN — INSULIN ASPART 1 UNITS: 100 INJECTION, SOLUTION INTRAVENOUS; SUBCUTANEOUS at 08:13

## 2022-10-23 RX ADMIN — FERROUS SULFATE TAB 325 MG (65 MG ELEMENTAL FE) 325 MG: 325 (65 FE) TAB at 08:13

## 2022-10-23 RX ADMIN — INSULIN ASPART 1 UNITS: 100 INJECTION, SOLUTION INTRAVENOUS; SUBCUTANEOUS at 17:16

## 2022-10-23 RX ADMIN — PIPERACILLIN AND TAZOBACTAM 3.38 G: 3; .375 INJECTION, POWDER, FOR SOLUTION INTRAVENOUS at 08:13

## 2022-10-23 RX ADMIN — TAMSULOSIN HYDROCHLORIDE 0.4 MG: 0.4 CAPSULE ORAL at 08:13

## 2022-10-23 RX ADMIN — SODIUM CHLORIDE: 9 INJECTION, SOLUTION INTRAVENOUS at 08:18

## 2022-10-23 RX ADMIN — PIPERACILLIN AND TAZOBACTAM 3.38 G: 3; .375 INJECTION, POWDER, FOR SOLUTION INTRAVENOUS at 01:34

## 2022-10-23 RX ADMIN — PANTOPRAZOLE SODIUM 40 MG: 40 TABLET, DELAYED RELEASE ORAL at 08:13

## 2022-10-23 RX ADMIN — POTASSIUM CHLORIDE 40 MEQ: 1500 TABLET, EXTENDED RELEASE ORAL at 17:16

## 2022-10-23 RX ADMIN — PIPERACILLIN AND TAZOBACTAM 3.38 G: 3; .375 INJECTION, POWDER, FOR SOLUTION INTRAVENOUS at 13:59

## 2022-10-23 RX ADMIN — INSULIN GLARGINE 10 UNITS: 100 INJECTION, SOLUTION SUBCUTANEOUS at 09:22

## 2022-10-23 RX ADMIN — INSULIN ASPART 1 UNITS: 100 INJECTION, SOLUTION INTRAVENOUS; SUBCUTANEOUS at 13:49

## 2022-10-23 RX ADMIN — PIPERACILLIN AND TAZOBACTAM 3.38 G: 3; .375 INJECTION, POWDER, FOR SOLUTION INTRAVENOUS at 20:30

## 2022-10-23 RX ADMIN — SENNOSIDES AND DOCUSATE SODIUM 1 TABLET: 50; 8.6 TABLET ORAL at 08:13

## 2022-10-23 ASSESSMENT — ACTIVITIES OF DAILY LIVING (ADL)
ADLS_ACUITY_SCORE: 38
ADLS_ACUITY_SCORE: 37

## 2022-10-23 NOTE — PROGRESS NOTES
[facility]    Medicine Progress Note - Hospitalist Service        Date of Admission:  10/22/2022 11:45 AM    Assessment & Plan:   [place] is a 72 year old male with a history of diabetes and hyperlipidemia who presented to the ER with urinary retention.  In the ER, his Henderson catheter was replaced and subsequently a significant amount of purulent appearing fluid drained through the catheter.  UA was significantly abnormal and his white count was elevated.  He was started on IV Zosyn for urinary tract infection.  The hospitalist service was contacted to admit him for further evaluation management.     Complicated urinary tract infection, catheter associated, present on admission  Urinary retention  -Initially had symptoms in August and was diagnosed with a UTI and completed a course of cephalexin.    -CT abdomen/pelvis on 9/30/2022 showed bilateral diffuse hydroureter/hydronephrosis with transition at the level of the urinary bladder; urinary bladder is significantly distended with significant heterogeneous wall thickening predominantly of the superior portion of the urinary bladder, cannot exclude urinary bladder malignancy, other differential consideration including chronic cystitis and chronic bladder outlet obstruction.  He saw urologist at [facility] on 10/12/2022, he was started on Flomax and recommended to continue catheterization with outpatient follow-up in 1 month, see notes in care everywhere for details.  -UA in the ER on 10/20/2022 suggestive of UTI with large leukocyte esterase, WBC clumps, moderate blood, 23 RBCs.  Noted to have leukocytosis with white blood cell count of 24.6.  Creatinine slightly elevated but not enough to meet criteria for STEVE.  -Continue empiric Zosyn; await urine culture  -Appreciate urology following.  -Henderson catheter was exchanged in the emergency room, continue Henderson catheter for now.  -Continue Flomax     Diabetes mellitus, type 2  -Prior to admission  on Lantus, Tradjenta, and metformin  -Current hemoglobin A1c is 6.6  -Hold PTA Tradjenta and metformin for now.  -Continue PTA Lantus at 10 units daily (this is half his home dose of 20 units daily).  Increase Lantus as clinically indicated  -Medium sliding scale     Hyponatremia  Mild, asymptomatic.  Sodium 132 in the ER.  -Resolved with IV fluids.  Discontinue IV fluids today     Anemia  He was noted to be anemic with a hemoglobin of 9.6 on 9/30/22 when he was seen in the ER.  Iron studies were suggestive of anemia of chronic disease.  He has not noted any melena or hematochezia.  When he was seen in the ER at the end of September arrangements were made for outpatient follow-up with GI.  -Hemoglobin stable at 10.2 on 10/22/2022.  -Hemoglobin dropped to 8.5 overnight, likely component of dilution  -Already has an appointment with Rita NDIAYE on 10/26 for upper and lower endoscopy for further evaluation of anemia.  If no acute issues will defer work-up of this as previously planned for later this week.  -Recheck hemoglobin tomorrow morning     Pulmonary nodules  CT abdomen/pelvis on 9/30/2022 showed multiple nodular pulmonary opacity predominantly in the right middle lobe measuring 7 mm.  -This was noted on a CT abdomen/pelvis, likely needs CT chest for further evaluation after he recovers from his acute illness.     Weight loss  Weight is down about 20-25 pounds since June of this year.  -Could be related to uncontrolled diabetes, which is now being treated.  -Has follow-up with GI for upper/lower endoscopy.  -Outpatient follow-up with PCP    Diet: Moderate Consistent Carb (60 g CHO per Meal) Diet     DVT Prophylaxis: Pneumatic Compression Devices   Henderson Catheter: PRESENT, indication: Retention  Code Status: Full Code     Disposition Plan       Expected Discharge Date: 10/24/2022              Entered: Matt Martines MD 10/23/2022, 1:04 PM        Clinically Significant Risk Factors Present on Admission        #  "Hypokalemia: Lowest K = 3.3 mmol/L (Ref range: 3.4-5.3) in last 2 days, will replace as needed  # Hyponatremia: Lowest Na = 132 mmol/L (Ref range: 136-145) in last 2 days, will monitor as appropriate             # DMII: A1C = 6.6 % (Ref range: 0.0 - 5.6 %) within past 3 months              The patient's care was discussed with the Bedside Nurse and Patient.    Matt Martines MD  Hospitalist Service  [facility]  Text Page 7AM-6PM  Securely message with the [employer] Web Console (learn more here)  Text page via [employer] Paging/Directory    ______________________________________________________________________    Interval History   Denies abdominal pain.  No nausea or vomiting.  Afebrile.  No flank pain.    Data reviewed today: I reviewed all medications, new labs and imaging results over the last 24 hours. I personally reviewed no images or EKG's today.    Physical Exam   Vital signs:  Temp: 98.6  F (37  C) Temp src: Oral BP: 120/57 Pulse: 84   Resp: 16 SpO2: 97 % O2 Device: None (Room air)   Height:  (5 10)    Estimated body mass index is 19.94 kg/m  as calculated from the following:    Height as of 9/30/22: 1.778 m (5' 10\").    Weight as of 9/30/22: 63 kg (139 lb).      Wt Readings from Last 2 Encounters:   09/30/22 63 kg (139 lb)   07/16/13 83.6 kg (184 lb 6.4 oz)       Gen: AAOX3, NAD, comfortable  Resp: CTA B/L, normal WOB  CVS: RRR, no murmur  Abd/GI: Soft, non-tender. BS- normoactive.    Skin: Warm, dry no rashes  : Henderson in place  MSK: no pedal edema  Neuro- CN- intact. No focal deficits.        Data   Recent Labs   Lab 10/23/22  1226 10/23/22  0744 10/23/22  0706 10/22/22  1442 10/22/22  1243   WBC  --   --  24.6*  --  24.6*   HGB  --   --  8.5*  --  10.2*   MCV  --   --  89  --  85   PLT  --   --  320  --  403   NA  --   --  134  --  132*   POTASSIUM  --   --  3.3*  --  4.4   CHLORIDE  --   --  103  --  100   CO2  --   --  25  --  27   BUN  --   --  25  --  35*   CR  --   --  0.94  " --  1.27*   ANIONGAP  --   --  6  --  5   MARNI  --   --  8.6  --  9.3   * 175* 168*   < > 203*    < > = values in this interval not displayed.       No results found for this or any previous visit (from the past 24 hour(s)).  Medications     sodium chloride 100 mL/hr at 10/23/22 0818       ferrous sulfate  325 mg Oral Daily     insulin aspart  1-7 Units Subcutaneous TID AC     insulin aspart  1-5 Units Subcutaneous At Bedtime     insulin glargine  10 Units Subcutaneous QAM AC     pantoprazole  40 mg Oral Daily     piperacillin-tazobactam  3.375 g Intravenous Q6H     senna-docusate  1 tablet Oral BID    Or     senna-docusate  2 tablet Oral BID     sodium chloride (PF)  3 mL Intracatheter Q8H     tamsulosin  0.4 mg Oral Daily

## 2022-10-23 NOTE — UTILIZATION REVIEW
"  [facility] Utilization Review  Admission Status; Secondary Review Determination     Admission Date: 10/22/2022 11:45 AM      Under the authority of the Utilization Management Committee, the utilization review process indicated a secondary review on the above patient.  The review outcome is based on review of the medical records, discussions with staff, and applying clinical experience noted on the date of the review.        (X) Observation Status Appropriate - This patient does not meet hospital inpatient criteria and is placed in observation status. If this patient's primary payer is Medicare and was admitted as an inpatient, Condition Code 44 should be used and patient status changed to \"observation\".   () Observation Status concurrent Review           RATIONALE FOR DETERMINATION   72-year-old male with history of diabetes mellitus, hyperlipidemia, admitted with urinary retention with recent episode of urinary retention and UTI when a Henderson catheter was placed with course of antibiotics completed.  Patient noticed that there was no output from his catheter, developed lower abdominal pain, denies any fever, chills or sweats.  Patient found to have elevated temperature and pulse with mild hyponatremia and acute kidney injury in the ER.  Urinalysis showed large leukocyte esterase, WBC clumps and moderate blood, Henderson catheter was removed and new catheter was placed with purulent appearing fluid draining through the new catheter.  Urology team recommend indwelling catheter associated UTI with urinary retention and incomplete bladder emptying, no interventions planned.  Patient has been afebrile, no tachycardia, but has significant leukocytosis, urine cultures pending.  Patient does not meet criteria for inpatient stay, recommend change to observation status, communicated to Dr. Martines      The severity of illness, intensity of service provided, expected LOS make the care appropriate for observation " status at this time.        The information on this document is developed by the utilization review team in order for the business office to ensure compliance.  This only denotes the appropriateness of proper admission status and does not reflect the quality of care rendered.         The definitions of Inpatient Status and Observation Status used in making the determination above are those provided in the CMS Coverage Manual, Chapter 1 and Chapter 6, section 70.4.      Sincerely,       Camilo Garza MD  Physician Advisor  Utilization Review-[place]    Phone: [phone]

## 2022-10-23 NOTE — PLAN OF CARE
Goal Outcome Evaluation:      Plan of Care Reviewed With: patient    Overall Patient Progress: no change     Date/Time: 10/22 3-11:30 shift    Trauma/Ortho/Medical (Choose one)  Medical    Diagnosis: UTI s/t chronic suh  use  POD#: N/A  Mental Status: A&O x4  Activity/dangle: Was independent at home but seems weak and unsteady, recommend  SBA x1.  Diet: Mod carb  Pain: Mild pain in abdomen that has improved  since urine has been draining  Suh/Voiding: Suh replaced in ED. Thick, dark, pinkish output.   Tele/Restraints/Iso: No  02/LDA: PIV with NS and antibiotics.   D/C Date: Estimated 2 days  Other Info: Arrived from ED at 1746. Pt very thin. Pressure injury on sacrum/coccyx- small slit with dusky and nonblanchable area. Covered  with Mepilex. 3 blanchable red spots along spine. Scrotum reddened and macerated. Had small amount of old dried BM stuck around anal area. Requesting WOC consult via sticky note.

## 2022-10-23 NOTE — PLAN OF CARE
A/OX4, denies pain. Up with 1/walker. Suh in place with much clear urine this evening.  Q 4hrs suh flushed per order. Diabetic, insulin coverage with meals done. Redness blanchable with small opening on coccyx, mepilex applied and WOC consult in. Turn and repositioned Q 2 hrs. IV SL. Will continue to monitor.

## 2022-10-23 NOTE — PLAN OF CARE
[id number]  Medical  Diagnosis:UTI  Mental Status:A&O x 4  Activity/dangle: Assist 1 gb/walker  Diet:Mod carb  Pain:denies.  Suh/Voiding:suh patent  Tele/Restraints/Iso:N/a  02/LDA: On RA, NS infusing at 100ml/hr  D/C Date:pending

## 2022-10-23 NOTE — CONSULTS
UROLOGY CONSULTATION:    PATIENT: [place]       (1950)  AGE: 72 year old year old male    Primary Care Provider / Referring Physician:  Heidi Fall    CHIEF COMPLAINT:  Urinary tract infection associated with indwelling urethral catheter, initial encounter (H)  Urinary retention with incomplete bladder emptying  Urinary catheter (Henderson) change required  Leukocytosis, unspecified type  CRF (chronic renal failure), stage 1    HPI:   Admitted for retention, pt of DR. Reis ( [facility])   [place] is a 72 year old male with a history of diabetes and hyperlipidemia who presented to the ER with urinary retention.      In the ER, his Henderson catheter was replaced and subsequently a significant amount of purulent appearing fluid drained through the catheter.  UA was significantly abnormal and his white count was elevated.  He was started on IV Zosyn for urinary tract infection.  The hospitalist service was contacted to admit him for further evaluation management.    Overnight Pt has no fevers, min low abdominal discomfort. Current;ly Henderson is draining well.         Past Medical History:   Diagnosis Date     Other and unspecified hyperlipidemia      Type II or unspecified type diabetes mellitus without mention of complication, not stated as uncontrolled     diet controlled     Past Surgical History:   Procedure Laterality Date     CATARACT IOL, RT/LT      bilaterally     PAST SOCIAL HISTORY  Social History     Social History Narrative    ** Merged History Encounter **         Dairy/d 2-4 servings/d.     Caffeine 3-4 servings/d    Exercise 2-3 x week    Sunscreen used - Yes    Seatbelts used - Yes    Working smoke/CO detectors in the home - Yes    Guns stored in the home - Yes    Self Breast Exams - NA    Self Testicular Exam - Yes    Eye Exam up to date - Yes    Dental Exam up to date - Yes    Pap Smear up to date - NA    Mammogram up to date - NA    PSA up to date - been a couple of years     Dexa Scan up to date - NA    Flex Sig / Colonoscopy up to date - >2-3 years normal    Immunizations up to date - less than 10 years but not sure when    Abuse: Current or Past(Physical, Sexual or Emotional)- No    Do you feel safe in your environment - Yes         Social History     Tobacco Use     Smoking status: Former     Types: Cigarettes     Smokeless tobacco: Never   Substance Use Topics     Alcohol use: Not Currently     Comment: few drinks a year     Review of Systems  @[email or web address]@             No current outpatient medications on file.                Allergies   Allergen Reactions     Sulfa Drugs Rash       PHYSICAL EXAM:          /57 (BP Location: Right arm)   Pulse 84   Temp 98.6  F (37  C) (Oral)   Resp 16   SpO2 97%          General appearance shows no deformaties and good grooming.        Yes       Sig: Take 500 mg by mouth 2 times daily   pantoprazole (PROTONIX) 40 MG EC tablet 10/22/2022 at am   No Yes   Sig: Take 1 tablet (40 mg) by mouth daily for 30 doses      Facility-Administered Medications: None            Allergies           Allergies   Allergen Reactions     Sulfa Drugs Rash            Physical Exam        Constitutional: awake, alert, cooperative, no apparent distress, laying in the hospital bed, cachectic  Eyes: sclera clear, conjunctiva normal  ENT: oral pharynx with moist mucous membranes  Respiratory: no increased work of breathing  Cardiovascular: regular rate and rhythm  GI: normal bowel sounds, soft, non-distended, mild suprapubic tenderness  : Henderson catheter in place with yellowish/orange milky appearing fluid  Skin: warm, dry  Musculoskeletal: no lower extremity pitting edema present  Neurologic: awake, alert, answers questions appropriately, moves all extremities      LABS:   PSA   Date Value Ref Range Status   06/26/2006 3.64 0 - 4 ug/L Final     UA RESULTS:  Recent Labs   Lab Test 10/22/22  1244   COLOR Dark Brown*   APPEARANCE Cloudy*   URINEGLC 50*   URINEBILI Negative    URINEKETONE Negative   SG 1.011   UBLD Moderate*   URINEPH 6.0   PROTEIN 200*   NITRITE Negative   LEUKEST Large*   RBCU 23*   WBCU >182*     Creatinine   Date Value Ref Range Status   10/23/2022 0.94 0.66 - 1.25 mg/dL Final   06/26/2006 1.00 0.80 - 1.50 mg/dL Final   ]  Hemoglobin   Date Value Ref Range Status   10/23/2022 8.5 (L) 13.3 - 17.7 g/dL Final   ]      ASSESSMENT:   1. Urinary tract infection associated with indwelling urethral catheter, initial encounter (H)    2. Urinary retention with incomplete bladder emptying    3. Urinary catheter (Henderson) change required    4. Leukocytosis, unspecified type    5. CRF (chronic renal failure), stage 1          PLAN:    Continue with Henderson for now, flush , treat with Abx   Since he had recent CT done 9/30/22 I would hold on CT at this moment since his source of infection is most likely cystitis and non-functioning Henderson  Follow-up with his primary urologist in 10 days         Saurav Doyle MD

## 2022-10-24 VITALS
DIASTOLIC BLOOD PRESSURE: 67 MMHG | BODY MASS INDEX: 17.87 KG/M2 | RESPIRATION RATE: 16 BRPM | OXYGEN SATURATION: 99 % | HEART RATE: 72 BPM | WEIGHT: 124.56 LBS | SYSTOLIC BLOOD PRESSURE: 123 MMHG | TEMPERATURE: 98.6 F

## 2022-10-24 LAB
BACTERIA UR CULT: ABNORMAL
GLUCOSE BLDC GLUCOMTR-MCNC: 113 MG/DL (ref 70–99)
GLUCOSE BLDC GLUCOMTR-MCNC: 115 MG/DL (ref 70–99)
GLUCOSE BLDC GLUCOMTR-MCNC: 196 MG/DL (ref 70–99)
HGB BLD-MCNC: 8.3 G/DL (ref 13.3–17.7)
WBC # BLD AUTO: 17.3 10E3/UL (ref 4–11)

## 2022-10-24 PROCEDURE — G0463 HOSPITAL OUTPT CLINIC VISIT: HCPCS | Mod: 25

## 2022-10-24 PROCEDURE — 96376 TX/PRO/DX INJ SAME DRUG ADON: CPT

## 2022-10-24 PROCEDURE — G0463 HOSPITAL OUTPT CLINIC VISIT: HCPCS

## 2022-10-24 PROCEDURE — 99217 PR OBSERVATION CARE DISCHARGE: CPT | Performed by: INTERNAL MEDICINE

## 2022-10-24 PROCEDURE — 250N000013 HC RX MED GY IP 250 OP 250 PS 637: Performed by: HOSPITALIST

## 2022-10-24 PROCEDURE — 36415 COLL VENOUS BLD VENIPUNCTURE: CPT | Performed by: INTERNAL MEDICINE

## 2022-10-24 PROCEDURE — 85048 AUTOMATED LEUKOCYTE COUNT: CPT | Performed by: INTERNAL MEDICINE

## 2022-10-24 PROCEDURE — 82962 GLUCOSE BLOOD TEST: CPT

## 2022-10-24 PROCEDURE — 96372 THER/PROPH/DIAG INJ SC/IM: CPT

## 2022-10-24 PROCEDURE — G0378 HOSPITAL OBSERVATION PER HR: HCPCS

## 2022-10-24 PROCEDURE — 87040 BLOOD CULTURE FOR BACTERIA: CPT | Performed by: INTERNAL MEDICINE

## 2022-10-24 PROCEDURE — 250N000011 HC RX IP 250 OP 636: Performed by: HOSPITALIST

## 2022-10-24 PROCEDURE — 250N000013 HC RX MED GY IP 250 OP 250 PS 637: Performed by: INTERNAL MEDICINE

## 2022-10-24 PROCEDURE — 85018 HEMOGLOBIN: CPT | Performed by: INTERNAL MEDICINE

## 2022-10-24 RX ORDER — TAMSULOSIN HYDROCHLORIDE 0.4 MG/1
0.4 CAPSULE ORAL DAILY
Qty: 30 CAPSULE | Refills: 0 | Status: SHIPPED | OUTPATIENT
Start: 2022-10-25

## 2022-10-24 RX ORDER — FLUCONAZOLE 200 MG/1
200 TABLET ORAL DAILY
Qty: 13 TABLET | Refills: 0 | Status: SHIPPED | OUTPATIENT
Start: 2022-10-25 | End: 2022-11-07

## 2022-10-24 RX ORDER — FLUCONAZOLE 200 MG/1
200 TABLET ORAL DAILY
Status: DISCONTINUED | OUTPATIENT
Start: 2022-10-24 | End: 2022-10-24 | Stop reason: HOSPADM

## 2022-10-24 RX ADMIN — PIPERACILLIN AND TAZOBACTAM 3.38 G: 3; .375 INJECTION, POWDER, FOR SOLUTION INTRAVENOUS at 14:37

## 2022-10-24 RX ADMIN — PIPERACILLIN AND TAZOBACTAM 3.38 G: 3; .375 INJECTION, POWDER, FOR SOLUTION INTRAVENOUS at 08:22

## 2022-10-24 RX ADMIN — TAMSULOSIN HYDROCHLORIDE 0.4 MG: 0.4 CAPSULE ORAL at 08:24

## 2022-10-24 RX ADMIN — INSULIN ASPART 2 UNITS: 100 INJECTION, SOLUTION INTRAVENOUS; SUBCUTANEOUS at 12:29

## 2022-10-24 RX ADMIN — PIPERACILLIN AND TAZOBACTAM 3.38 G: 3; .375 INJECTION, POWDER, FOR SOLUTION INTRAVENOUS at 02:07

## 2022-10-24 RX ADMIN — PANTOPRAZOLE SODIUM 40 MG: 40 TABLET, DELAYED RELEASE ORAL at 08:24

## 2022-10-24 RX ADMIN — FLUCONAZOLE 200 MG: 200 TABLET ORAL at 17:05

## 2022-10-24 RX ADMIN — INSULIN GLARGINE 10 UNITS: 100 INJECTION, SOLUTION SUBCUTANEOUS at 08:38

## 2022-10-24 RX ADMIN — FERROUS SULFATE TAB 325 MG (65 MG ELEMENTAL FE) 325 MG: 325 (65 FE) TAB at 08:24

## 2022-10-24 RX ADMIN — SENNOSIDES AND DOCUSATE SODIUM 2 TABLET: 50; 8.6 TABLET ORAL at 08:24

## 2022-10-24 ASSESSMENT — ACTIVITIES OF DAILY LIVING (ADL)
ADLS_ACUITY_SCORE: 37

## 2022-10-24 NOTE — PROVIDER NOTIFICATION
Urology PA, Cherrie BANKS, was called to verify suh flush order for discharge. PA said pt should flush suh 2 times a day and as needed with 60ml of  NS. AVS was updated.

## 2022-10-24 NOTE — PROGRESS NOTES
Notified provider about indwelling suh catheter discussed removal or continued need.    Did provider choose to remove indwelling suh catheter? No    Provider's suh indication for keeping indwelling suh catheter: Other - chronic indwelling suh catheter with plan to discharge with catheter in place.    Is there an order for indwelling suh catheter? Yes    *If there is a plan to keep suh catheter in place at discharge daily notification with provider is not necessary.

## 2022-10-24 NOTE — CONSULTS
"[facility] Nurse Inpatient Assessment     Consulted for: pre existing coccyx     Patient History (according to provider note(s):      [place] is a 72 year old male with a history of diabetes and hyperlipidemia who presented to the ER with urinary retention.  In the ER, his Henderson catheter was replaced and subsequently a significant amount of purulent appearing fluid drained through the catheter.  UA was significantly abnormal and his white count was elevated.  He was started on IV Zosyn for urinary tract infection.  The hospitalist service was contacted to admit him for further evaluation management.       Areas Assessed:      Areas visualized during today's visit: Sacrum/coccyx    Skin Injury Location: coccyx    Last photo: 10/23/22        Skin injury due to: blanchable erythema  Skin history and plan of care:   Pt reports   Affected area: Coccyx/sacrum     Skin assessment: blanchable erythema darker over coccyx/right sacrum     Measurements (length x width x depth, in cm) 6  x 8  x  0 cm      Color: pink     Temperature  normal      Drainage: none .      Color: none      Odor: none  Pain: denies , none  Pain interventions prior to dressing change: N/A  Treatment goal: Heal  and Protection  STATUS: initial assessment  Supplies ordered: supplies stored on unit         Treatment Plan:     Coccyx/sacrum wound(s): Every 3 days   1. Clean wound with saline or MicroKlenz Spray, pat dry  2. Wipe / \"clean\" the surrounding periwound tissue with skin prep (Cavilon No Sting Skin Prep #[id number]) and allow to dry. This will help protect periwound and help dressing adherence  3. Press a Mepilex  Sacral Dressing (PS#[id number])to the area, making sure to conform nicely to skin curvatures.   4. Time and date dressing change  NOTE  -Reposition pt side to side judith when in bed, every 2 hours-get the pt way over on side to completely offload pressure. This will benefit skin and respiratory function   -Keep " "heels elevated and floating on pillows at all times. Try using at least 2 pillows under each calf.  -When up to the chair pt needs to fully offload every 2 hours and use a chair cushion if needed       Pressure Injury Prevention (PIP) Plan:  If patient is declining pressure injury prevention interventions: Explore reason why and address patient's concerns, Educate on pressure injury risk and prevention intervention(s) and If patient is still declining, document \"informed refusal\"   Mattress: Follow bed algorithm, reassess daily and order specialty mattress, if indicated.  HOB: Maintain at or below 30 degrees, unless contraindicated  Repositioning in bed: Every 1-2 hours , Left/right positioning; avoid supine and Raise foot of bed prior to raising head of bed, to reduce patient sliding down (shear)  Heels: Keep elevated off mattress and Pillows under calves  Protective Dressing: Sacral Mepilex for prevention (#[id number]),  especially for the agitated patient   Positioning Equipment: None  Chair positioning: Repositions self: patient to shift weight every 15 minutes and patient needs reminders   If patient has a buttock pressure injury, or high risk for PI use chair cushion or SPS.  Moisture Management: Perineal cleansing /protection: Follow Incontinence Protocol, Avoid brief in bed and Moisturize dry skin  Under Devices: Inspect skin under all medical devices during skin inspection , Ensure tubes are stabilized without tension and Ensure patient is not lying on medical devices or equipment when repositioned  Ask provider to discontinue device when no longer needed.    Orders: Written    RECOMMEND PRIMARY TEAM ORDER: None, at this time  Education provided: importance of repositioning, plan of care and Off-loading pressure  Discussed plan of care with: Patient and Nurse  WOC nurse follow-up plan: signing off  Notify WOC if wound(s) deteriorate.  Nursing to notify the Provider(s) and re-consult the WOC Nurse if new skin " concern.    DATA:     Current support surface: Standard  Atmos Air mattress, ordered pulsate  Containment of urine/stool: Incontinence Protocol and Indwelling catheter  BMI: Body mass index is 17.87 kg/m .   Active diet order: Orders Placed This Encounter      Moderate Consistent Carb (60 g CHO per Meal) Diet      Diet     Output: I/O last 3 completed shifts:  In: -   Out: 2100 [Urine:2100]     Labs: Recent Labs   Lab 10/24/22  0529 10/23/22  0706 10/22/22  1243   HGB 8.3*   < > 10.2*   WBC 17.3*   < > 24.6*   A1C  --   --  6.6*    < > = values in this interval not displayed.     Pressure injury risk assessment:   Sensory Perception: 4-->no impairment  Moisture: 3-->occasionally moist  Activity: 3-->walks occasionally  Mobility: 3-->slightly limited  Nutrition: 3-->adequate  Friction and Shear: 3-->no apparent problem  Cachorro Score: 19    You Crowder RN CWOCN  Dept. Pager: [phone]  Dept. Office Number: [phone]

## 2022-10-24 NOTE — CARE PLAN
Pt was A & O x 4. Lungs sound clear, bowel sounds active, cms intact. Slow blanchable redness in Coccyx, mepilex applied. discharge teaching on suh flushing was shown to pt and pt repeat back. Discharge instructions and meds were reviewed with and given to pt. Pt discharged home

## 2022-10-24 NOTE — PLAN OF CARE
Goal Outcome Evaluation:         Pt A&Ox4. VSS-RA. Up 1 Gb walker. Henderson patent and draining, flushed q4h. IV SL. Blood sugar checks. Denies pain. Iv antibiotics. Meplex coccyx. Continue to monitor.

## 2022-10-24 NOTE — UTILIZATION REVIEW
"  [facility] Utilization Review  Admission Status; Secondary Review Determination     Admission Date: 10/22/2022 11:45 AM      Under the authority of the Utilization Management Committee, the utilization review process indicated a secondary review on the above patient.  The review outcome is based on review of the medical records, discussions with staff, and applying clinical experience noted on the date of the review.        (X) Observation Status Appropriate - This patient does not meet hospital inpatient criteria and is placed in observation status. If this patient's primary payer is Medicare and was admitted as an inpatient, Condition Code 44 should be used and patient status changed to \"observation\".   () Observation Status concurrent Review           RATIONALE FOR DETERMINATION   72-year-old male with history of diabetes mellitus, hyperlipidemia, admitted with urinary retention, Henderson catheter was changed in the ED and was started on IV Zosyn for UTI.  CT abdomen showed diffuse bilateral hydroureter with hydronephrosis with transition at the level of the urinary bladder with wall thickening in the superior portion.  Henderson catheter was exchanged in the ED, urine cultures now growing Candida.  Plan to discontinue Zosyn, and blood cultures to be done with improving leukocytosis and concern for possible candidemia, although has been afebrile, no tachycardia.  Patient had hemoglobin drop but plans for outpatient work-up.  Patient now has Candida UTI, started on oral Diflucan, blood cultures pending, does not meet criteria for inpatient stay, recommend continue observation status      The severity of illness, intensity of service provided, expected LOS make the care appropriate for observation status at this time.        The information on this document is developed by the utilization review team in order for the business office to ensure compliance.  This only denotes the appropriateness of proper " admission status and does not reflect the quality of care rendered.         The definitions of Inpatient Status and Observation Status used in making the determination above are those provided in the CMS Coverage Manual, Chapter 1 and Chapter 6, section 70.4.      Sincerely,       Camilo Garza MD  Physician Advisor  Utilization Review-[place]    Phone: [phone]

## 2022-10-24 NOTE — DISCHARGE SUMMARY
[facility]  Hospitalist Discharge Summary       Date of Admission:  10/22/2022  Date of Discharge:  10/24/2022  Discharging Provider: Dex Aguirre MD      Discharge Diagnoses   Complicated urinary tract infection, catheter associated, present on admission  Urinary retention  Diabetes mellitus, type 2  Hyponatremia  Anemia  Pulmonary nodules  Weight loss  Coccyx wound, POA    Follow-ups Needed After Discharge   Follow-up Appointments     Follow-up and recommended labs and tests       Follow up with your primary urologist in 10 days.             Unresulted Labs Ordered in the Past 30 Days of this Admission     Date and Time Order Name Status Description    10/24/2022  1:46 PM Blood Culture Arm, Right In process     10/24/2022  1:46 PM Blood Culture Arm, Left In process       These results will be followed up by hospitalist     Hospital Course   South Tovar is a 72 year old male with a history of diabetes and hyperlipidemia who presents with catheter malfunction.  In the ER, his Henderson catheter was replaced and subsequently a significant amount of purulent appearing fluid drained through the catheter.  UA was significantly abnormal and his white count was elevated.  He was started on piperacillin-tazobactam IV for urinary tract infection.  The hospitalist service was contacted to admit him for further evaluation management.     Complicated urinary tract infection, catheter associated, present on admission  Urinary retention  -Initially had symptoms in August and was diagnosed with a UTI and completed a course of cephalexin.    -CT abdomen/pelvis on 9/30/2022 showed bilateral diffuse hydroureter/hydronephrosis with transition at the level of the urinary bladder; urinary bladder is significantly distended with significant heterogeneous wall thickening predominantly of the superior portion of the urinary bladder, cannot exclude urinary bladder malignancy, other differential consideration  including chronic cystitis and chronic bladder outlet obstruction.  He saw urologist at [facility] on 10/12/2022, he was started on Flomax and recommended to continue catheterization with outpatient follow-up in 1 month   -UA in the ER on 10/20/2022 suggestive of UTI with large leukocyte esterase, WBC clumps, moderate blood, 23 RBCs.  Noted to have leukocytosis with white blood cell count of 24.6.  Creatinine slightly elevated but not enough to meet criteria for STEVE.  -Urology consulted, recommended continuing flushes and follow-up with his outpatient urologist   -Started on piperacillin-tazobactam IV empirically.  Urine culture returned with Candida albicans.  Given concerns for true infection, antimicrobial management team in conjunction with infectious disease recommended 14-day treatment with fluconazole.  -Continue tamsulosin   -Given significant leukocytosis, blood cultures sent to rule out invasive fungal disease, though WBC had improved prior to starting fungal treatment      Diabetes mellitus, type 2  Continue prior to admission regimen     Hyponatremia  Mild.  Likely hypovolemic as improved with IV fluids     Anemia  He was noted to be anemic with a hemoglobin of 9.6 on 9/30/22 when he was seen in the ER.  Iron studies were suggestive of anemia of chronic disease.  He has not noted any melena or hematochezia.  When he was seen in the ER at the end of September arrangements were made for outpatient follow-up with GI.  -Already has an appointment with Rita [facility]IA[facility] on 10/26 for upper and lower endoscopy for further evaluation of anemia.     Pulmonary nodules  CT abdomen/pelvis on 9/30/2022 showed multiple nodular pulmonary opacity predominantly in the right middle lobe measuring 7 mm.  - Consider dedicated chest CT as outpatient      Weight loss  Weight is down about 20-25 pounds since June of this year.  -Could be related to uncontrolled diabetes, which is now being treated.  -Has follow-up with GI for  upper/lower endoscopy.  -Outpatient follow-up with PCP    Rachel wound, POA  - Wound RN consulted, cares per wound RN    Consultations This Hospital Stay   UROLOGY IP CONSULT  WOUND OSTOMY CONTINENCE NURSE  IP CONSULT    Code Status   Full Code    Time Spent on this Encounter   I, Dex Aguirre MD, personally saw the patient today and spent greater than 30 minutes discharging this patient.       Dex Aguirre MD  [facility]  ______________________________________________________________________    Physical Exam   Vital Signs: Temp: 98.6  F (37  C) Temp src: Oral BP: 123/67 Pulse: 72   Resp: 16 SpO2: 99 % O2 Device: None (Room air)    Weight: 124 lbs 8.96 oz    Constitutional: Well-appearing, NAD  Respiratory: Clear to auscultation bilaterally, good air movement bilaterally  Cardiovascular: RRR. No peripheral edema.  GI: Soft, non-tender, non-distended.   Skin/Integumen: Warm, dry  Other:         Primary Care Physician   Heidi Fall    Discharge Disposition   Discharged to home  Condition at discharge: Stable      Discharge Orders      Reason for your hospital stay    You were hospitalized for a urinary tract infection and Suh catheter malfunction.     Follow-up and recommended labs and tests     Follow up with your primary urologist in 10 days.     Activity    Your activity upon discharge: activity as tolerated     Tubes and drains    You are going home with the following tubes or drains: suh catheter.  Tube cares per urology instructions     Diet    Follow this diet upon discharge: Orders Placed This Encounter      Moderate Consistent Carb (60 g CHO per Meal) Diet     Discharge Medications   Current Discharge Medication List      START taking these medications    Details   fluconazole (DIFLUCAN) 200 MG tablet Take 1 tablet (200 mg) by mouth daily for 13 days  Qty: 13 tablet, Refills: 0    Associated Diagnoses: Urinary tract infection associated with indwelling  urethral catheter, initial encounter (H)      tamsulosin (FLOMAX) 0.4 MG capsule Take 1 capsule (0.4 mg) by mouth daily  Qty: 30 capsule, Refills: 0    Associated Diagnoses: Urinary retention with incomplete bladder emptying         CONTINUE these medications which have NOT CHANGED    Details   ferrous sulfate (FEROSUL) 325 (65 Fe) MG tablet Take 325 mg by mouth daily      insulin glargine (LANTUS PEN) 100 UNIT/ML pen Inject 20 Units Subcutaneous every morning      linagliptin (TRADJENTA) 5 MG TABS tablet Take 5 mg by mouth daily      metFORMIN (GLUCOPHAGE XR) 500 MG 24 hr tablet Take 500 mg by mouth 2 times daily      pantoprazole (PROTONIX) 40 MG EC tablet Take 1 tablet (40 mg) by mouth daily for 30 doses  Qty: 30 tablet, Refills: 0      ONE TOUCH ULTRA TEST VI STRP as directed   Qty: 100, Refills: 6    Associated Diagnoses: Type II or unspecified type diabetes mellitus without mention of complication, not stated as uncontrolled             Significant Results and Procedures   Most Recent 3 CBC's:  Recent Labs   Lab Test 10/24/22  0529 10/23/22  0706 10/22/22  1243 09/30/22  1029   WBC 17.3* 24.6* 24.6* 19.2*   HGB 8.3* 8.5* 10.2* 9.6*   MCV  --  89 85 88   PLT  --  320 403 620*     Most Recent 3 BMP's:  Recent Labs   Lab Test 10/24/22  1118 10/24/22  0732 10/24/22  0203 10/23/22  2234 10/23/22  0744 10/23/22  0706 10/22/22  1442 10/22/22  1243 09/30/22  1029   NA  --   --   --   --   --  134  --  132* 131*   POTASSIUM  --   --   --  3.6  --  3.3*  --  4.4 3.7   CHLORIDE  --   --   --   --   --  103  --  100 98   CO2  --   --   --   --   --  25  --  27 25   BUN  --   --   --   --   --  25  --  35* 35*   CR  --   --   --   --   --  0.94  --  1.27* 1.52*   ANIONGAP  --   --   --   --   --  6  --  5 8   MARNI  --   --   --   --   --  8.6  --  9.3 8.9   * 115* 113*  --    < > 168*   < > 203* 91    < > = values in this interval not displayed.     Most Recent 2 LFT's:  Recent Labs   Lab Test 09/30/22  1029   AST 38    ALT 37   ALKPHOS 165*   BILITOTAL 0.7     Most Recent 3 INR's:  Recent Labs   Lab Test 09/30/22  1029   INR 1.20*     Most Recent 3 Troponin's:No lab results found.  Most Recent 3 BNP's:No lab results found.  Most Recent Cholesterol Panel:No lab results found.  Most Recent 6 Bacteria Isolates From Any Culture (See EPIC Reports for Culture Details):No lab results found.  Most Recent TSH and T4:No lab results found.  Most Recent Hemoglobin A1c:  Recent Labs   Lab Test 10/22/22  1243   A1C 6.6*     Most Recent Urinalysis:  Recent Labs   Lab Test 10/22/22  1244   COLOR Dark Brown*   APPEARANCE Cloudy*   URINEGLC 50*   URINEBILI Negative   URINEKETONE Negative   SG 1.011   UBLD Moderate*   URINEPH 6.0   PROTEIN 200*   NITRITE Negative   LEUKEST Large*   RBCU 23*   WBCU >182*     Most Recent ABG:No lab results found.  Most Recent ESR & CRP:No lab results found.,   Results for orders placed or performed during the hospital encounter of 09/30/22   CT Abdomen pelvis - oral contrast only    Narrative    CT ABDOMEN AND PELVIS WITHOUT CONTRAST  9/30/2022 1:23 PM    HISTORY: Weight loss, stool and urine incontinence.    TECHNIQUE: CT scan obtained of the abdomen, and pelvis without IV  contrast. Radiation dose for this scan was reduced using automated  exposure control, adjustment of the mA and/or kV according to patient  size, or iterative reconstruction technique.    COMPARISON: None available    FINDINGS:    Lower chest: Multiple nodular pulmonary opacity in the right middle  lobe measuring 7 mm (series 2 image 16)    Abdomen/pelvis:Limited evaluation of the abdominal organs due to lack  of intravenous contrast, within this limitation, there is;    Hepatobiliary: The unenhanced liver and gallbladder are grossly  unremarkable.    Pancreas: The unenhanced pancreas is grossly unremarkable.    Spleen: No splenomegaly.    Adrenal glands: No adrenal nodules.    Kidneys: No evidence kidney stones. Bilateral  diffuse  hydroureter/hydronephrosis with a transition point at the level of the  urinary bladder.    Bowel: No abnormally dilated bowel loops. Moderate amount of stool  throughout the colon, with largest stool ball in the rectum.    Peritoneum: No significant free fluid in the abdomen or pelvis. No  free peritoneal portal venous gas.    Pelvic organs: The urinary bladder is significantly distended with  significant irregular urinary bladder wall thickening predominantly of  the superior portion of the bladder (series 2 image 130 and series 4  image 29).    Vascular: Unremarkable.    Lymph nodes: No significant abdominopelvic lymphadenopathy.    Bones and soft tissue: No suspicious osseous lesion.      Impression    IMPRESSION:   1. Moderate amount of stool throughout the colon with large stool in  the rectum, nonspecific, can be seen with constipation.  2. Bilateral diffuse hydroureter/hydronephrosis with transition at the  level of the urinary bladder. The urinary bladder is significantly  distended with significant heterogenous wall thickening predominantly  of the superior portion of the urinary bladder, cannot exclude urinary  bladder malignancy. Other differential consideration including chronic  cystitis and chronic bladder outlet obstruction.  3. Multiple nodular pulmonary opacity predominantly in the right  middle lobe measuring 7 mm, indeterminate, could be infectious, less  likely neoplastic.    POLA MICHAEL MD         SYSTEM ID:  [id number]       Allergies   Allergies   Allergen Reactions     Sulfa Drugs Rash

## 2022-10-24 NOTE — PROGRESS NOTES
Antimicrobial Stewardship Team Note    Antimicrobial Stewardship Program - A joint venture between [facility] and [facility] [employer] to optimize antibiotic management.     Patient: South Tovar  MRN: [id number]  Allergies: Sulfa drugs    Brief Summary: South Tovar is a 72 year old male admitted on 10/22/22 with urinary retention. PMH significant for diabetes and HLD. Henderson catheter was replaced in ED with significant amount of purulent appearing fluid drained through the catheter.    He completed course of cephalexin in August for UTI as well as 9/30 and 10/12 for which he completed cephalosporin courses as well. Urine culture on 9/30 with < 10k Candida albicans. Urine culture 10/12 with no growth. He saw outpatient urologist on 10/12 who started him on tamsulosin and recommended continued catheterization with outpatient follow up in 1 month. UA on 10/20 with >182 WBC,  large LE, and WBC clumps. Leukocytosis of 24.6. He was started on Zosyn for suspected UTI. Urine culture grew >100k Candida albicans.         Active Anti-infective Medications   (From admission, onward)                 Start     Stop    10/22/22 2000  piperacillin-tazobactam  3.375 g,   Intravenous,   EVERY 6 HOURS        Urinary Tract Infection        --                  Assessment: Candida albicans UTI  Patient presented with urinary retention, purulent fluid drained after Henderson catheter was replaced. Urine culture growing Candida albicans, not typically treated as true pathogen however given patient's presentation and presence of Henderson catheter, reasonable to complete course of antifungal therapy with fluconazole 200mg daily for 14 days. No bacteria isolated in urine so ongoing treatment with Zosyn not required at this time. Given patient's significant leukocytosis, some concern for possible candidemia, recommend obtaining blood cultures prior to starting fluconazole.    Recommendations:  Obtain blood cultures given  leukocytosis and concern for possible candidemia. Please obtain prior to starting antifungal therapy.  Stop Zosyn and start fluconazole 200mg daily for 14 days for UTI.    Discussed with ID Staff MD Heidi Glynn, PharmD, [facility]    Vital Signs/Clinical Features:  Vitals         10/22 0700  10/23 0659 10/23 0700  10/24 0659 10/24 0700  10/24 1342   Most Recent      Temp ( F) 98.5 -  99.1    98.6 -  98.8      98.1     98.1 (36.7) 10/24 0733    Pulse 87 -  101    76 -  84      72     72 10/24 0733    Resp   16      16      16     16 10/24 0733    /48 -  145/67    112/57 -  120/57      106/46     106/46 10/24 0733    SpO2 (%) 97 -  100    96 -  98      96     96 10/24 0733            Labs  Estimated Creatinine Clearance: 55.9 mL/min (based on SCr of 0.94 mg/dL).  Recent Labs   Lab Test 09/30/22  1029 10/22/22  1243 10/23/22  0706   CR 1.52* 1.27* 0.94       Recent Labs   Lab Test 09/30/22  1029 10/22/22  1243 10/23/22  0706 10/24/22  0529   WBC 19.2* 24.6* 24.6*  --    HGB 9.6* 10.2* 8.5* 8.3*   HCT 31.1* 30.8* 26.3*  --    MCV 88 85 89  --    * 403 320  --        Recent Labs   Lab Test 09/30/22  1029   BILITOTAL 0.7   ALKPHOS 165*   ALBUMIN 2.1*   AST 38   ALT 37                   Culture Results:  7-Day Micro Results       Procedure Component Value Units Date/Time    Urine Culture [[id number]] Collected: 10/22/22 1244    Order Status: Canceled Lab Status: No result Updated: 10/22/22 1251    Specimen: Urine, Henderson Catheter     Urine Culture [[id number]]  (Abnormal) Collected: 10/22/22 1244    Order Status: Completed Lab Status: Final result Updated: 10/24/22 0633    Specimen: Urine, Henderson Catheter      Culture >100,000 CFU/mL Candida albicans     Comment: Susceptibilities not routinely done               Recent Labs   Lab Test 09/30/22  1343 10/22/22  1244   URINEPH 6.0 6.0   NITRITE Negative Negative   LEUKEST Large* Large*   WBCU >182* >182*                         Imaging: No results  found.

## 2022-10-25 ENCOUNTER — PATIENT OUTREACH (OUTPATIENT)
Dept: CARE COORDINATION | Facility: CLINIC | Age: 72
End: 2022-10-25

## 2022-10-25 NOTE — PROGRESS NOTES
"Clinic Care Coordination Contact  [facility]: Post-Discharge Note  SITUATION                                                      Admission:    Admission Date: 10/22/22   Reason for Admission: Catheter malfunction  Discharge:   Discharge Date: 10/24/22  Discharge Diagnosis: Complicated urinary tract infection, catheter associated, present on admission,  Urinary retention,  Diabetes mellitus, type 2,  Hyponatremia,  Anemia,  Pulmonary nodules,  Weight loss,  Coccyx wound, POA    BACKGROUND                                                      Per hospital discharge summary and inpatient provider notes:    South Tovar is a 72 year old male with a history of diabetes and hyperlipidemia who presented to the ER with urinary retention.  He was seen in the [facility] ER at the end of September and was found to have urinary retention and UTI.  A Henderson catheter was placed and he was discharged home with a course of antibiotics.  Subsequently followed up with urology at [facility] on 10/12/2022.  There is again concern for bladder infection he was given a course of antibiotics.  There was plan for further work-up in the future after he recovered from his infection.  He had been doing okay at home up until the night before last when he noticed that the output from his catheter basically stopped.  He did not have any output from the catheter yesterday or last night.  He did try switching to the leg bag today and felt like he had a small amount of output with that.  He has had some lower abdominal pain associated with this.  Denies any fevers, chills, sweats.  No flank pain.  Due to the lack of output from his catheter he came into the ER today for evaluation.    ASSESSMENT      Discharge Assessment  How are you doing now that you are home?: \"Well I'm doing alright\"  How are your symptoms? (Red Flag symptoms escalate to triage hotline per guidelines): Improved  Do you feel your condition is stable enough to be " safe at home until your provider visit?: Yes  Does the patient have their discharge instructions? : Yes  Does the patient have questions regarding their discharge instructions? : No  Were you started on any new medications or were there changes to any of your previous medications? : Yes  Does the patient have all of their medications?: Yes  Do you have questions regarding any of your medications? : No  Do you have all of your needed medical supplies or equipment (DME)?  (i.e. oxygen tank, CPAP, cane, etc.): Yes  Discharge follow-up appointment scheduled within 14 calendar days? : Yes  Discharge Follow Up Appointment Date: 11/10/22  Discharge Follow Up Appointment Scheduled with?: Primary Care Provider    Post-op ([facility] CTA Only)  If the patient had a surgery or procedure, do they have any questions for a nurse?: No    PLAN                                                      Outpatient Plan:      Follow up with your primary urologist in 10 days.    No future appointments.      For any urgent concerns, please contact our 24 hour nurse triage line: [phone] ([phone])       Belkis Verdugo  Community Health Worker  [facility]  Ph: [phone]

## 2022-10-29 LAB
BACTERIA BLD CULT: NO GROWTH
BACTERIA BLD CULT: NO GROWTH

## 2022-11-25 ENCOUNTER — ANCILLARY PROCEDURE (OUTPATIENT)
Dept: CT IMAGING | Facility: CLINIC | Age: 72
End: 2022-11-25
Attending: INTERNAL MEDICINE
Payer: COMMERCIAL

## 2022-11-25 DIAGNOSIS — K63.5 COLORECTAL POLYPS: ICD-10-CM

## 2022-11-25 DIAGNOSIS — K31.7 POLYP OF DUODENUM: ICD-10-CM

## 2022-11-25 DIAGNOSIS — K63.89 COLONIC MASS: ICD-10-CM

## 2022-11-25 DIAGNOSIS — K62.1 COLORECTAL POLYPS: ICD-10-CM

## 2022-11-25 PROCEDURE — 255N000002 HC RX 255 OP 636: Performed by: INTERNAL MEDICINE

## 2022-11-25 PROCEDURE — 74177 CT ABD & PELVIS W/CONTRAST: CPT

## 2022-11-25 RX ADMIN — IOHEXOL 100 ML: 350 INJECTION, SOLUTION INTRAVENOUS at 08:02

## 2023-01-11 ENCOUNTER — HOSPITAL ENCOUNTER (INPATIENT)
Facility: CLINIC | Age: 73
LOS: 7 days | Discharge: NURSING, REHAB OR LONG-TERM CARE | DRG: 871 | End: 2023-01-18
Attending: EMERGENCY MEDICINE | Admitting: HOSPITALIST
Payer: COMMERCIAL

## 2023-01-11 ENCOUNTER — APPOINTMENT (OUTPATIENT)
Dept: CT IMAGING | Facility: CLINIC | Age: 73
DRG: 871 | End: 2023-01-11
Attending: EMERGENCY MEDICINE
Payer: COMMERCIAL

## 2023-01-11 DIAGNOSIS — N39.0 URINARY TRACT INFECTION ASSOCIATED WITH INDWELLING URETHRAL CATHETER, INITIAL ENCOUNTER (H): Primary | ICD-10-CM

## 2023-01-11 DIAGNOSIS — R78.81 BACTEREMIA DUE TO KLEBSIELLA PNEUMONIAE: ICD-10-CM

## 2023-01-11 DIAGNOSIS — N17.9 ACUTE RENAL FAILURE, UNSPECIFIED ACUTE RENAL FAILURE TYPE (H): ICD-10-CM

## 2023-01-11 DIAGNOSIS — E11.9 TYPE 2 DIABETES MELLITUS WITHOUT COMPLICATION, WITH LONG-TERM CURRENT USE OF INSULIN (H): ICD-10-CM

## 2023-01-11 DIAGNOSIS — S09.90XA CLOSED HEAD INJURY, INITIAL ENCOUNTER: ICD-10-CM

## 2023-01-11 DIAGNOSIS — M62.81 GENERALIZED MUSCLE WEAKNESS: ICD-10-CM

## 2023-01-11 DIAGNOSIS — R33.9 URINARY RETENTION WITH INCOMPLETE BLADDER EMPTYING: ICD-10-CM

## 2023-01-11 DIAGNOSIS — Z79.4 TYPE 2 DIABETES MELLITUS WITHOUT COMPLICATION, WITH LONG-TERM CURRENT USE OF INSULIN (H): ICD-10-CM

## 2023-01-11 DIAGNOSIS — S01.81XA FACIAL LACERATION, INITIAL ENCOUNTER: ICD-10-CM

## 2023-01-11 DIAGNOSIS — T83.511A URINARY TRACT INFECTION ASSOCIATED WITH INDWELLING URETHRAL CATHETER, INITIAL ENCOUNTER (H): Primary | ICD-10-CM

## 2023-01-11 DIAGNOSIS — B96.1 BACTEREMIA DUE TO KLEBSIELLA PNEUMONIAE: ICD-10-CM

## 2023-01-11 LAB
ALBUMIN SERPL-MCNC: 2.3 G/DL (ref 3.4–5)
ALBUMIN UR-MCNC: 100 MG/DL
ALP SERPL-CCNC: 187 U/L (ref 40–150)
ALT SERPL W P-5'-P-CCNC: 74 U/L (ref 0–70)
ANION GAP SERPL CALCULATED.3IONS-SCNC: 15 MMOL/L (ref 3–14)
APPEARANCE UR: ABNORMAL
AST SERPL W P-5'-P-CCNC: 61 U/L (ref 0–45)
ATRIAL RATE - MUSE: 106 BPM
BACTERIA #/AREA URNS HPF: ABNORMAL /HPF
BASOPHILS # BLD AUTO: 0.1 10E3/UL (ref 0–0.2)
BASOPHILS NFR BLD AUTO: 0 %
BILIRUB SERPL-MCNC: 1.1 MG/DL (ref 0.2–1.3)
BILIRUB UR QL STRIP: NEGATIVE
BUN SERPL-MCNC: 133 MG/DL (ref 7–30)
CALCIUM SERPL-MCNC: 9.3 MG/DL (ref 8.5–10.1)
CHLORIDE BLD-SCNC: 90 MMOL/L (ref 94–109)
CO2 SERPL-SCNC: 17 MMOL/L (ref 20–32)
COLOR UR AUTO: ABNORMAL
CREAT SERPL-MCNC: 3.86 MG/DL (ref 0.66–1.25)
DIASTOLIC BLOOD PRESSURE - MUSE: NORMAL MMHG
EOSINOPHIL # BLD AUTO: 0 10E3/UL (ref 0–0.7)
EOSINOPHIL NFR BLD AUTO: 0 %
ERYTHROCYTE [DISTWIDTH] IN BLOOD BY AUTOMATED COUNT: 14.5 % (ref 10–15)
GFR SERPL CREATININE-BSD FRML MDRD: 16 ML/MIN/1.73M2
GLUCOSE BLD-MCNC: 202 MG/DL (ref 70–99)
GLUCOSE UR STRIP-MCNC: NEGATIVE MG/DL
HCT VFR BLD AUTO: 36.6 % (ref 40–53)
HGB BLD-MCNC: 12.1 G/DL (ref 13.3–17.7)
HGB UR QL STRIP: ABNORMAL
HYALINE CASTS: 56 /LPF
IMM GRANULOCYTES # BLD: 0.3 10E3/UL
IMM GRANULOCYTES NFR BLD: 1 %
INTERPRETATION ECG - MUSE: NORMAL
KETONES UR STRIP-MCNC: NEGATIVE MG/DL
LACTATE SERPL-SCNC: 1.5 MMOL/L (ref 0.7–2)
LEUKOCYTE ESTERASE UR QL STRIP: ABNORMAL
LIPASE SERPL-CCNC: 64 U/L (ref 73–393)
LYMPHOCYTES # BLD AUTO: 1.1 10E3/UL (ref 0.8–5.3)
LYMPHOCYTES NFR BLD AUTO: 5 %
MCH RBC QN AUTO: 27.4 PG (ref 26.5–33)
MCHC RBC AUTO-ENTMCNC: 33.1 G/DL (ref 31.5–36.5)
MCV RBC AUTO: 83 FL (ref 78–100)
MONOCYTES # BLD AUTO: 0.9 10E3/UL (ref 0–1.3)
MONOCYTES NFR BLD AUTO: 4 %
NEUTROPHILS # BLD AUTO: 21.2 10E3/UL (ref 1.6–8.3)
NEUTROPHILS NFR BLD AUTO: 90 %
NITRATE UR QL: NEGATIVE
NRBC # BLD AUTO: 0 10E3/UL
NRBC BLD AUTO-RTO: 0 /100
P AXIS - MUSE: 90 DEGREES
PH UR STRIP: 6.5 [PH] (ref 5–7)
PLATELET # BLD AUTO: 596 10E3/UL (ref 150–450)
POTASSIUM BLD-SCNC: 5.5 MMOL/L (ref 3.4–5.3)
PR INTERVAL - MUSE: 190 MS
PROT SERPL-MCNC: 8.8 G/DL (ref 6.8–8.8)
QRS DURATION - MUSE: 78 MS
QT - MUSE: 326 MS
QTC - MUSE: 433 MS
R AXIS - MUSE: 69 DEGREES
RBC # BLD AUTO: 4.42 10E6/UL (ref 4.4–5.9)
RBC URINE: 14 /HPF
SODIUM SERPL-SCNC: 122 MMOL/L (ref 133–144)
SP GR UR STRIP: 1.01 (ref 1–1.03)
SYSTOLIC BLOOD PRESSURE - MUSE: NORMAL MMHG
T AXIS - MUSE: 77 DEGREES
UROBILINOGEN UR STRIP-MCNC: NORMAL MG/DL
VENTRICULAR RATE- MUSE: 106 BPM
WBC # BLD AUTO: 23.6 10E3/UL (ref 4–11)
WBC CLUMPS #/AREA URNS HPF: PRESENT /HPF
WBC URINE: >182 /HPF

## 2023-01-11 PROCEDURE — 93005 ELECTROCARDIOGRAM TRACING: CPT

## 2023-01-11 PROCEDURE — 87149 DNA/RNA DIRECT PROBE: CPT | Performed by: EMERGENCY MEDICINE

## 2023-01-11 PROCEDURE — 99223 1ST HOSP IP/OBS HIGH 75: CPT | Mod: AI | Performed by: HOSPITALIST

## 2023-01-11 PROCEDURE — 87077 CULTURE AEROBIC IDENTIFY: CPT | Performed by: EMERGENCY MEDICINE

## 2023-01-11 PROCEDURE — 258N000003 HC RX IP 258 OP 636: Performed by: EMERGENCY MEDICINE

## 2023-01-11 PROCEDURE — 70450 CT HEAD/BRAIN W/O DYE: CPT

## 2023-01-11 PROCEDURE — 120N000001 HC R&B MED SURG/OB

## 2023-01-11 PROCEDURE — 258N000003 HC RX IP 258 OP 636: Performed by: HOSPITALIST

## 2023-01-11 PROCEDURE — 83690 ASSAY OF LIPASE: CPT | Performed by: EMERGENCY MEDICINE

## 2023-01-11 PROCEDURE — 81001 URINALYSIS AUTO W/SCOPE: CPT | Performed by: EMERGENCY MEDICINE

## 2023-01-11 PROCEDURE — 250N000011 HC RX IP 250 OP 636: Performed by: EMERGENCY MEDICINE

## 2023-01-11 PROCEDURE — 80053 COMPREHEN METABOLIC PANEL: CPT | Performed by: EMERGENCY MEDICINE

## 2023-01-11 PROCEDURE — 96365 THER/PROPH/DIAG IV INF INIT: CPT

## 2023-01-11 PROCEDURE — 36415 COLL VENOUS BLD VENIPUNCTURE: CPT | Performed by: EMERGENCY MEDICINE

## 2023-01-11 PROCEDURE — 0HQ1XZZ REPAIR FACE SKIN, EXTERNAL APPROACH: ICD-10-PCS | Performed by: EMERGENCY MEDICINE

## 2023-01-11 PROCEDURE — 85025 COMPLETE CBC W/AUTO DIFF WBC: CPT | Performed by: EMERGENCY MEDICINE

## 2023-01-11 PROCEDURE — 99285 EMERGENCY DEPT VISIT HI MDM: CPT | Mod: 25

## 2023-01-11 PROCEDURE — 12011 RPR F/E/E/N/L/M 2.5 CM/<: CPT

## 2023-01-11 PROCEDURE — 51798 US URINE CAPACITY MEASURE: CPT

## 2023-01-11 PROCEDURE — 87088 URINE BACTERIA CULTURE: CPT | Performed by: EMERGENCY MEDICINE

## 2023-01-11 PROCEDURE — 96361 HYDRATE IV INFUSION ADD-ON: CPT

## 2023-01-11 PROCEDURE — 51702 INSERT TEMP BLADDER CATH: CPT

## 2023-01-11 PROCEDURE — 83605 ASSAY OF LACTIC ACID: CPT | Performed by: EMERGENCY MEDICINE

## 2023-01-11 RX ORDER — ACETAMINOPHEN 325 MG/1
650 TABLET ORAL EVERY 6 HOURS PRN
Status: DISCONTINUED | OUTPATIENT
Start: 2023-01-11 | End: 2023-01-18 | Stop reason: HOSPADM

## 2023-01-11 RX ORDER — DEXTROSE MONOHYDRATE 25 G/50ML
25-50 INJECTION, SOLUTION INTRAVENOUS
Status: DISCONTINUED | OUTPATIENT
Start: 2023-01-11 | End: 2023-01-18 | Stop reason: HOSPADM

## 2023-01-11 RX ORDER — AMOXICILLIN 250 MG
1 CAPSULE ORAL 2 TIMES DAILY PRN
Status: DISCONTINUED | OUTPATIENT
Start: 2023-01-11 | End: 2023-01-18 | Stop reason: HOSPADM

## 2023-01-11 RX ORDER — AMOXICILLIN 250 MG
2 CAPSULE ORAL 2 TIMES DAILY PRN
Status: DISCONTINUED | OUTPATIENT
Start: 2023-01-11 | End: 2023-01-18 | Stop reason: HOSPADM

## 2023-01-11 RX ORDER — CEFTRIAXONE 2 G/1
2 INJECTION, POWDER, FOR SOLUTION INTRAMUSCULAR; INTRAVENOUS EVERY 24 HOURS
Status: DISCONTINUED | OUTPATIENT
Start: 2023-01-12 | End: 2023-01-17

## 2023-01-11 RX ORDER — LANOLIN ALCOHOL/MO/W.PET/CERES
3 CREAM (GRAM) TOPICAL
Status: DISCONTINUED | OUTPATIENT
Start: 2023-01-11 | End: 2023-01-18 | Stop reason: HOSPADM

## 2023-01-11 RX ORDER — ACETAMINOPHEN 650 MG/1
650 SUPPOSITORY RECTAL EVERY 6 HOURS PRN
Status: DISCONTINUED | OUTPATIENT
Start: 2023-01-11 | End: 2023-01-18 | Stop reason: HOSPADM

## 2023-01-11 RX ORDER — NICOTINE POLACRILEX 4 MG
15-30 LOZENGE BUCCAL
Status: DISCONTINUED | OUTPATIENT
Start: 2023-01-11 | End: 2023-01-18 | Stop reason: HOSPADM

## 2023-01-11 RX ORDER — ONDANSETRON 2 MG/ML
4 INJECTION INTRAMUSCULAR; INTRAVENOUS EVERY 6 HOURS PRN
Status: DISCONTINUED | OUTPATIENT
Start: 2023-01-11 | End: 2023-01-18 | Stop reason: HOSPADM

## 2023-01-11 RX ORDER — LIDOCAINE 40 MG/G
CREAM TOPICAL
Status: DISCONTINUED | OUTPATIENT
Start: 2023-01-11 | End: 2023-01-18 | Stop reason: HOSPADM

## 2023-01-11 RX ORDER — CEFTRIAXONE 1 G/1
1 INJECTION, POWDER, FOR SOLUTION INTRAMUSCULAR; INTRAVENOUS ONCE
Status: COMPLETED | OUTPATIENT
Start: 2023-01-11 | End: 2023-01-11

## 2023-01-11 RX ORDER — SODIUM CHLORIDE 9 MG/ML
INJECTION, SOLUTION INTRAVENOUS CONTINUOUS
Status: DISCONTINUED | OUTPATIENT
Start: 2023-01-11 | End: 2023-01-12

## 2023-01-11 RX ORDER — ONDANSETRON 4 MG/1
4 TABLET, ORALLY DISINTEGRATING ORAL EVERY 6 HOURS PRN
Status: DISCONTINUED | OUTPATIENT
Start: 2023-01-11 | End: 2023-01-18 | Stop reason: HOSPADM

## 2023-01-11 RX ADMIN — SODIUM CHLORIDE 1000 ML: 9 INJECTION, SOLUTION INTRAVENOUS at 21:42

## 2023-01-11 RX ADMIN — SODIUM CHLORIDE 1000 ML: 9 INJECTION, SOLUTION INTRAVENOUS at 23:53

## 2023-01-11 RX ADMIN — CEFTRIAXONE SODIUM 1 G: 1 INJECTION, POWDER, FOR SOLUTION INTRAMUSCULAR; INTRAVENOUS at 23:20

## 2023-01-11 ASSESSMENT — ENCOUNTER SYMPTOMS
WEAKNESS: 1
LIGHT-HEADEDNESS: 0
HEADACHES: 0
ABDOMINAL PAIN: 1
WOUND: 1
DIZZINESS: 0

## 2023-01-11 ASSESSMENT — ACTIVITIES OF DAILY LIVING (ADL)
ADLS_ACUITY_SCORE: 37
ADLS_ACUITY_SCORE: 35

## 2023-01-11 NOTE — ED TRIAGE NOTES
Patient brought in by EMS from home. Patient lives with wife. EMS reports neighbor went to check on patient and found patient to have a laceration to mid forehead. Patient reports fell 4 days ago and hit head on vacuum . EMS reports house is in disarray. Denies LOC. Denies being on thinners. Patient's blood sugar is 204 prehospital.

## 2023-01-12 ENCOUNTER — APPOINTMENT (OUTPATIENT)
Dept: ULTRASOUND IMAGING | Facility: CLINIC | Age: 73
DRG: 871 | End: 2023-01-12
Attending: HOSPITALIST
Payer: COMMERCIAL

## 2023-01-12 LAB
ACINETOBACTER SPECIES: NOT DETECTED
ANION GAP SERPL CALCULATED.3IONS-SCNC: 10 MMOL/L (ref 3–14)
BASOPHILS # BLD AUTO: 0 10E3/UL (ref 0–0.2)
BASOPHILS NFR BLD AUTO: 0 %
BUN SERPL-MCNC: 116 MG/DL (ref 7–30)
CALCIUM SERPL-MCNC: 7.9 MG/DL (ref 8.5–10.1)
CHLORIDE BLD-SCNC: 103 MMOL/L (ref 94–109)
CITROBACTER SPECIES: NOT DETECTED
CO2 SERPL-SCNC: 18 MMOL/L (ref 20–32)
CREAT SERPL-MCNC: 3.06 MG/DL (ref 0.66–1.25)
CREAT UR-MCNC: 41 MG/DL
CTX-M: NOT DETECTED
ENTEROBACTER SPECIES: NOT DETECTED
EOSINOPHIL # BLD AUTO: 0 10E3/UL (ref 0–0.7)
EOSINOPHIL NFR BLD AUTO: 0 %
ERYTHROCYTE [DISTWIDTH] IN BLOOD BY AUTOMATED COUNT: 14.5 % (ref 10–15)
ESCHERICHIA COLI: NOT DETECTED
FRACT EXCRET NA UR+SERPL-RTO: 4.7 %
GFR SERPL CREATININE-BSD FRML MDRD: 21 ML/MIN/1.73M2
GLUCOSE BLD-MCNC: 68 MG/DL (ref 70–99)
GLUCOSE BLDC GLUCOMTR-MCNC: 127 MG/DL (ref 70–99)
GLUCOSE BLDC GLUCOMTR-MCNC: 127 MG/DL (ref 70–99)
GLUCOSE BLDC GLUCOMTR-MCNC: 141 MG/DL (ref 70–99)
GLUCOSE BLDC GLUCOMTR-MCNC: 259 MG/DL (ref 70–99)
GLUCOSE BLDC GLUCOMTR-MCNC: 277 MG/DL (ref 70–99)
GLUCOSE BLDC GLUCOMTR-MCNC: 283 MG/DL (ref 70–99)
GLUCOSE BLDC GLUCOMTR-MCNC: 71 MG/DL (ref 70–99)
GLUCOSE BLDC GLUCOMTR-MCNC: 93 MG/DL (ref 70–99)
HCT VFR BLD AUTO: 31 % (ref 40–53)
HGB BLD-MCNC: 10.4 G/DL (ref 13.3–17.7)
IMM GRANULOCYTES # BLD: 0.2 10E3/UL
IMM GRANULOCYTES NFR BLD: 1 %
IMP: NOT DETECTED
KLEBSIELLA OXYTOCA: NOT DETECTED
KLEBSIELLA PNEUMONIAE: DETECTED
KPC: NOT DETECTED
LACTATE SERPL-SCNC: 0.9 MMOL/L (ref 0.7–2)
LYMPHOCYTES # BLD AUTO: 1.2 10E3/UL (ref 0.8–5.3)
LYMPHOCYTES NFR BLD AUTO: 5 %
MCH RBC QN AUTO: 27.7 PG (ref 26.5–33)
MCHC RBC AUTO-ENTMCNC: 33.5 G/DL (ref 31.5–36.5)
MCV RBC AUTO: 82 FL (ref 78–100)
MONOCYTES # BLD AUTO: 1.1 10E3/UL (ref 0–1.3)
MONOCYTES NFR BLD AUTO: 5 %
NDM: NOT DETECTED
NEUTROPHILS # BLD AUTO: 19.6 10E3/UL (ref 1.6–8.3)
NEUTROPHILS NFR BLD AUTO: 89 %
NRBC # BLD AUTO: 0 10E3/UL
NRBC BLD AUTO-RTO: 0 /100
OXA (DETECTED/NOT DETECTED): NOT DETECTED
PLATELET # BLD AUTO: 517 10E3/UL (ref 150–450)
POTASSIUM BLD-SCNC: 4 MMOL/L (ref 3.4–5.3)
PROTEUS SPECIES: NOT DETECTED
PSEUDOMONAS AERUGINOSA: NOT DETECTED
RBC # BLD AUTO: 3.76 10E6/UL (ref 4.4–5.9)
SODIUM SERPL-SCNC: 131 MMOL/L (ref 133–144)
SODIUM SERPL-SCNC: 132 MMOL/L (ref 133–144)
SODIUM SERPL-SCNC: 135 MMOL/L (ref 133–144)
SODIUM UR-SCNC: 61 MMOL/L
VIM: NOT DETECTED
WBC # BLD AUTO: 22.1 10E3/UL (ref 4–11)

## 2023-01-12 PROCEDURE — 258N000003 HC RX IP 258 OP 636: Performed by: HOSPITALIST

## 2023-01-12 PROCEDURE — 250N000013 HC RX MED GY IP 250 OP 250 PS 637: Performed by: HOSPITALIST

## 2023-01-12 PROCEDURE — 36415 COLL VENOUS BLD VENIPUNCTURE: CPT | Performed by: HOSPITALIST

## 2023-01-12 PROCEDURE — 250N000012 HC RX MED GY IP 250 OP 636 PS 637: Performed by: HOSPITALIST

## 2023-01-12 PROCEDURE — 83605 ASSAY OF LACTIC ACID: CPT | Performed by: INTERNAL MEDICINE

## 2023-01-12 PROCEDURE — 76770 US EXAM ABDO BACK WALL COMP: CPT

## 2023-01-12 PROCEDURE — 82962 GLUCOSE BLOOD TEST: CPT

## 2023-01-12 PROCEDURE — 99232 SBSQ HOSP IP/OBS MODERATE 35: CPT | Performed by: HOSPITALIST

## 2023-01-12 PROCEDURE — 85025 COMPLETE CBC W/AUTO DIFF WBC: CPT | Performed by: HOSPITALIST

## 2023-01-12 PROCEDURE — 36415 COLL VENOUS BLD VENIPUNCTURE: CPT | Performed by: INTERNAL MEDICINE

## 2023-01-12 PROCEDURE — 250N000011 HC RX IP 250 OP 636: Performed by: HOSPITALIST

## 2023-01-12 PROCEDURE — 258N000001 HC RX 258: Performed by: HOSPITALIST

## 2023-01-12 PROCEDURE — 84295 ASSAY OF SERUM SODIUM: CPT | Performed by: HOSPITALIST

## 2023-01-12 PROCEDURE — 120N000001 HC R&B MED SURG/OB

## 2023-01-12 PROCEDURE — 84300 ASSAY OF URINE SODIUM: CPT | Performed by: HOSPITALIST

## 2023-01-12 PROCEDURE — 80048 BASIC METABOLIC PNL TOTAL CA: CPT | Performed by: HOSPITALIST

## 2023-01-12 RX ORDER — DEXTROSE MONOHYDRATE 50 MG/ML
INJECTION, SOLUTION INTRAVENOUS CONTINUOUS
Status: DISCONTINUED | OUTPATIENT
Start: 2023-01-12 | End: 2023-01-13

## 2023-01-12 RX ORDER — FERROUS SULFATE 325(65) MG
325 TABLET ORAL DAILY
Status: DISCONTINUED | OUTPATIENT
Start: 2023-01-12 | End: 2023-01-18 | Stop reason: HOSPADM

## 2023-01-12 RX ORDER — TAMSULOSIN HYDROCHLORIDE 0.4 MG/1
0.4 CAPSULE ORAL DAILY
Status: DISCONTINUED | OUTPATIENT
Start: 2023-01-12 | End: 2023-01-18 | Stop reason: HOSPADM

## 2023-01-12 RX ADMIN — SODIUM CHLORIDE: 9 INJECTION, SOLUTION INTRAVENOUS at 00:30

## 2023-01-12 RX ADMIN — DEXTROSE AND SODIUM CHLORIDE: 5; 450 INJECTION, SOLUTION INTRAVENOUS at 07:48

## 2023-01-12 RX ADMIN — CEFTRIAXONE SODIUM 2 G: 2 INJECTION, POWDER, FOR SOLUTION INTRAMUSCULAR; INTRAVENOUS at 20:24

## 2023-01-12 RX ADMIN — DEXTROSE MONOHYDRATE: 50 INJECTION, SOLUTION INTRAVENOUS at 13:29

## 2023-01-12 RX ADMIN — FERROUS SULFATE TAB 325 MG (65 MG ELEMENTAL FE) 325 MG: 325 (65 FE) TAB at 08:34

## 2023-01-12 RX ADMIN — INSULIN ASPART 1 UNITS: 100 INJECTION, SOLUTION INTRAVENOUS; SUBCUTANEOUS at 14:31

## 2023-01-12 RX ADMIN — DEXTROSE MONOHYDRATE 25 ML: 25 INJECTION, SOLUTION INTRAVENOUS at 06:04

## 2023-01-12 RX ADMIN — INSULIN ASPART 2 UNITS: 100 INJECTION, SOLUTION INTRAVENOUS; SUBCUTANEOUS at 18:20

## 2023-01-12 RX ADMIN — TAMSULOSIN HYDROCHLORIDE 0.4 MG: 0.4 CAPSULE ORAL at 08:34

## 2023-01-12 ASSESSMENT — ACTIVITIES OF DAILY LIVING (ADL)
ADLS_ACUITY_SCORE: 37
ADLS_ACUITY_SCORE: 41
ADLS_ACUITY_SCORE: 37

## 2023-01-12 NOTE — ED NOTES
Emergency Department Technician Wound Irrigation Note:    1/11/2023    10:18 PM      Wound location:  superior frontal scalp 2 cm laceration in the process of healing and a 3.5cm U-shaped laceration to the mid forehead     Irrigation Fluid: Normal Saline    Estimated Irrigation Volume (60 mL fluid per cm): 300 cc    Pauline White

## 2023-01-12 NOTE — CONSULTS
"Provider Order received - \"suspected protein calorie malnutrition, severe\"  - Pt boarding in ED. RD will assess fully once transferred to inpatient unit. Noted diet and supplements are already in place.   "

## 2023-01-12 NOTE — H&P
[facility]    History and Physical  Hospitalist       Date of Admission:  1/11/2023  Date of Service (when I saw the patient): 01/11/23    ASSESSMENT  South Tovar is a markedly pleasant 72 year old gentleman with past medical history that is most notable for chronic urinary retention, chronic iron deficiency anemia, and Type 2 Diabetes mellitus, who presents with progressive weakness and fall and is found to have suspected sepsis and acute septic encephalopathy due to UTI, as well as severe suspected obstructive STEVE and acute hyponatremia.    PLAN     Suspected UTI causing sepsis and acute septic encephalopathy: Of note, Mr. Tovar was seen in our ED on 9/30/22 for symptoms of UTI and urinary retention, and was found on CT to have bilateral hydroureter and hydronephrosis with a thickened bladder. He was treated with a course of cephalosporins, and followed up with [facility], and reportedly was started on Flomax, and underwent further antibiotic treatment as an outpatient. He was then hospitalized here from 10/22/22 to 10/24/22 for CAUTI, found to be due to Candida UTI, in the setting of uncontrolled Type 2 Diabetes mellitus. ID was consulted and he received 2 weeks of Fluconazole. After discharge, he was seen by his Urology team in 11/2022 and his catheter was removed. He also has chronic anemia, and an outpatient EGD and colonoscopy were done in 11/2022 by WENDY NDIAYE, and reportedly a colonic mass was seen, as discussed below; he had a follow up CT of chest, abdomen and pelvis at that time as an outpatient (on 11/25/22) which reportedly showed improved bladder wall thickening and distension with improved hydronephrosis bilaterally, as well as prostatomegaly.    Now, he presents with 1-2 weeks of progressively severe fatigue, and a recent fall. In the ED, he has TMax 99.0. He appears disoriented on interview. He is found to be retaining nearly 1000 ml of urine in the ED. He has  sinus tachycardia with concomitant leukocytosis, thrombocytosis, and left shift. Lactate is normal thus far. He has severe STEVE as below. He has acute metabolic acidosis (CO2 17). A Henderson catheter was placed and UA shows pyuria. Head CT was negative for ICH. Overall, we suspect UTI causing sepsis and acute septic encephalopathy, as well as STEVE that could be due to ongoing obstruction.     -- Inpatient. Fall precautions. NPO after midnight. Urology consulted for further evaluation (note is made of outpatient Urology assessment that mentions consideration of urodynamic study vs possible TURP). Henderson catheter to be maintained for now. Empiric Ceftriaxone continued. Follow up cultures. Resume Flomax when verified.    Severe STEVE: Suspect due to obstruction as well as hypovolemia.     -- IVF NS 75 ml/hour ordered. Repeat Creatinine in AM.    -- FeNa ordered. Renal US ordered.    -- Low threshold for Nephrology consultation if Cr does not improve in the next 1-2 days    Recent Labs   Lab Test 01/11/23  2100 10/23/22  0706 10/22/22  1243   CR 3.86* 0.94 1.27*     Acute hypochloremic hyponatremia: 122 tonight, and was 139 on 10/7/22 in Care Everywhere. Suspect due to hypovolemia. May need to monitor for rapid over-correction today.    -- Low rate of NS ordered overnight; q 6 Na checks; goal 4-6 points rise in the next 24 hours; would switch to 1/2 NS if rising too quickly     Chronic iron deficiency anemia: Of note, recent EGD and colonoscopy were reportedly done 11/2022. He tells me that he was found to have polyps in the colon and also in the small intestine, and has seen a surgeon with recommendation for possible resection of part of his small intestine. He is markedly cachectic, with severe recent weight loss, and could have intestinal cancer. I do not have further details of the endoscopy results or the recommended care plan at present.      -- Monitor HGB closely while hospitalized. Resume oral iron when  verified    Recent Labs   Lab Test 01/11/23  2100 10/24/22  0529 10/23/22  0706   HGB 12.1* 8.3* 8.5*     Type 2Diabetes mellitus: Recent Labs   Lab Test 10/22/22  1243   A1C 6.6*   . On Lantus, Metformin, Linagliptin as an outpatient.    -- ISS insulin while hospitalized. Hold oral anti-diabetic medications. Resume Lantus when verified; wjhen the dose is verified, would consider resuming at half strength in the setting of his current STEVE.    Suspected severe protein calorie malnutrition: Nutrition services consulted.    I have spent 80 minutes on the date of service doing chart review, history, examination, documentation, and further activities per the note.    Chief Complaint   Weakness    History is obtained from the patient and the ED physician whom I have spoken with    History of Present Illness   South Tovar is a markedly pleasant 72 year old gentleman who presents with generalized weakness, which he feels started about 2 weeks or so ago, constant and progressive since onset, associated with unsteadiness on his feet. He says that he fell about 6 days ago. He endorses low appetite and little fluid or other PO intake in the past week. He adds that over the past year in fact, he has lost over 30 pounds unintentionally. He has chronic difficulties starting his urine stream, and chronic urinary incontinence that gets worse at night. He appears ill and disoriented at times to time during the interview; in fact it seems he had told the ED his fall was yesterday and when redirected he confirms this other date. He otherwise denies recent fever, chills, or sweats; he denies dysuria or hematuria, or any nausea, vomiting, or constipation or diarrhea, or hematochezia (his stool appears dark which he attributes to iron supplementation); he denies dizziness or light headedness with standing or walking, he denies any other acute complaints.    In the ED,   01/11 1526 106/66 97.8  F (36.6  C) 108 18 100 %     TMax in the ED  "99.0. He was found to have retention on bladder scan of 1000 ml.    CBC and CMP were notable for WBC 23.6, 90% neutrophils, HGB 12.1, , Na 122, Cl 90, K 5.5, CO2 17, , Cr 3.86, AG 15; alb 2.3, aphos 187, alt 74, ast 61, glucose 202, otherwise were within the normal reference range. Lactate was 1.5, Lipase 64. EKG showed sinus tachycardia without ST segment elevations.    A Henderson catheter was placed in the ED. UA showed greater than 182 WBC, 14 RBC. Urine cultures were sent.    He was given IV fluid and Ceftriaxone in the ED.    Recent Results (from the past 24 hour(s))   Head CT w/o contrast    Narrative    EXAM: CT HEAD W/O CONTRAST  LOCATION: [facility]  DATE/TIME: 1/11/2023 4:49 PM    INDICATION: fall, head injury  COMPARISON: None.  TECHNIQUE: Routine CT Head without IV contrast. Multiplanar reformats. Dose reduction techniques were used.    FINDINGS:  INTRACRANIAL CONTENTS: No intracranial hemorrhage, extraaxial collection, or mass effect.  No CT evidence of acute infarct. Mild presumed chronic small vessel ischemic changes. Normal ventricles and sulci.     VISUALIZED ORBITS/SINUSES/MASTOIDS: No intraorbital abnormality. Previous cataract surgery. No paranasal sinus mucosal disease. No middle ear or mastoid effusion.    BONES/SOFT TISSUES: No acute abnormality.      Impression    IMPRESSION:  1.  Normal head CT.       PHYSICAL EXAM  Blood pressure 110/76, pulse 98, temperature 99  F (37.2  C), resp. rate 11, height 1.778 m (5' 10\"), weight 56.2 kg (124 lb), SpO2 100 %.  Constitutional: Alert and oriented to person, place, but not to time; appears ill; markedly frail and cachectic appearing  HEENT: left temporal laceration, now sutured; dry mucus membranes  Respiratory: lungs clear to auscultation bilaterally  Cardiovascular: regular S1 S2  GI: abdomen soft non tender non distended bowel sounds positive though faint  Lymph/Hematologic: pale  Genitourinary: Henderson catheter " placed and draining cloudy urine  Skin: no rash, poor turgor  Musculoskeletal: no clubbing, cyanosis or edema; marked diffuse muscle atrophy  Neurologic: extra-ocular muscles intact; moves all four extremities  Psychiatric: appropriate affect, speech rambling, perseverative and tangential at times     DVT Prophylaxis: Pneumatic Compression Devices  Code Status: Full Code    Disposition: Expected discharge in 3-5 days for further IV fluid, appropriate correction of Na in the next 48-72 hours, ongoing need for IV antibiotics, and pending improvement in renal function    Vikash Betancourt MD, MD    Past Medical History    I have reviewed this patient's medical history and updated it with pertinent information if needed.   Past Medical History:   Diagnosis Date     Anemia, iron deficiency      Other and unspecified hyperlipidemia      Shingles 2013     Type II or unspecified type diabetes mellitus without mention of complication, not stated as uncontrolled     diet controlled       Past Surgical History   I have reviewed this patient's surgical history and updated it with pertinent information if needed.  Past Surgical History:   Procedure Laterality Date     CATARACT IOL, RT/LT  1997    bilaterally       Prior to Admission Medications   Prior to Admission Medications   Prescriptions Last Dose Informant Patient Reported? Taking?   ONE TOUCH ULTRA TEST VI STRP   No No   Sig: as directed    ferrous sulfate (FEROSUL) 325 (65 Fe) MG tablet   Yes No   Sig: Take 325 mg by mouth daily   insulin glargine (LANTUS PEN) 100 UNIT/ML pen   Yes No   Sig: Inject 20 Units Subcutaneous every morning   linagliptin (TRADJENTA) 5 MG TABS tablet   Yes No   Sig: Take 5 mg by mouth daily   metFORMIN (GLUCOPHAGE XR) 500 MG 24 hr tablet   Yes No   Sig: Take 500 mg by mouth 2 times daily   tamsulosin (FLOMAX) 0.4 MG capsule   No No   Sig: Take 1 capsule (0.4 mg) by mouth daily      Facility-Administered Medications: None     Allergies    Allergies   Allergen Reactions     Sulfa Drugs Rash       Social History   I have reviewed this patient's social history and updated it with pertinent information if needed. [place] Tovar  reports that he has quit smoking. His smoking use included cigarettes. He has never used smokeless tobacco. He reports that he does not currently use alcohol. He reports that he does not use drugs.    Family History   Family history assessed and, except as above, is non-contributory.    Family History   Problem Relation Age of Onset     Eye Disorder Mother      Alzheimer Disease Mother      Diabetes Father      Cancer - colorectal Father      Lung Cancer Father      Cancer Maternal Grandmother         kidney     Cancer Maternal Grandfather         lung cancer-smoker and      Cancer Paternal Grandmother         leukemia     Unknown/Adopted Paternal Grandfather        Review of Systems   The 10 point Review of Systems is negative other than noted in the HPI or here.     Primary Care Physician   Physician No Ref-Primary    Data   Labs Ordered and Resulted from Time of ED Arrival to Time of ED Departure   COMPREHENSIVE METABOLIC PANEL - Abnormal       Result Value    Sodium 122 (*)     Potassium 5.5 (*)     Chloride 90 (*)     Carbon Dioxide (CO2) 17 (*)     Anion Gap 15 (*)     Urea Nitrogen 133 (*)     Creatinine 3.86 (*)     Calcium 9.3      Glucose 202 (*)     Alkaline Phosphatase 187 (*)     AST 61 (*)     ALT 74 (*)     Protein Total 8.8      Albumin 2.3 (*)     Bilirubin Total 1.1      GFR Estimate 16 (*)    LIPASE - Abnormal    Lipase 64 (*)    ROUTINE UA WITH MICROSCOPIC REFLEX TO CULTURE - Abnormal    Color Urine Brown (*)     Appearance Urine Cloudy (*)     Glucose Urine Negative      Bilirubin Urine Negative      Ketones Urine Negative      Specific Gravity Urine 1.010      Blood Urine Moderate (*)     pH Urine 6.5      Protein Albumin Urine 100 (*)     Urobilinogen Urine Normal      Nitrite Urine Negative       Leukocyte Esterase Urine Large (*)     Bacteria Urine Few (*)     WBC Clumps Urine Present (*)     RBC Urine 14 (*)     WBC Urine >182 (*)     Hyaline Casts Urine 56 (*)    CBC WITH PLATELETS AND DIFFERENTIAL - Abnormal    WBC Count 23.6 (*)     RBC Count 4.42      Hemoglobin 12.1 (*)     Hematocrit 36.6 (*)     MCV 83      MCH 27.4      MCHC 33.1      RDW 14.5      Platelet Count 596 (*)     % Neutrophils 90      % Lymphocytes 5      % Monocytes 4      % Eosinophils 0      % Basophils 0      % Immature Granulocytes 1      NRBCs per 100 WBC 0      Absolute Neutrophils 21.2 (*)     Absolute Lymphocytes 1.1      Absolute Monocytes 0.9      Absolute Eosinophils 0.0      Absolute Basophils 0.1      Absolute Immature Granulocytes 0.3      Absolute NRBCs 0.0     LACTIC ACID WHOLE BLOOD - Normal    Lactic Acid 1.5     URINE CULTURE       Data reviewed today:  I personally reviewed the EKG tracing showing sinus tachycardia and the head CT image(s) showing no acute pathology.

## 2023-01-12 NOTE — PROGRESS NOTES
[facility]    Medicine Progress Note - Hospitalist Service    Date of Admission:  1/11/2023    Assessment & Plan   South Tovar is a markedly pleasant 72 year old gentleman with past medical history that is most notable for chronic urinary retention, chronic iron deficiency anemia, and Type 2 Diabetes mellitus, who presents with progressive weakness and fall and is found to have suspected sepsis and acute septic encephalopathy due to UTI, as well as severe suspected obstructive STEVE and acute hyponatremia.     Suspected UTI causing sepsis and acute septic encephalopathy  Of note, Mr. Tovar was seen in our ED on 9/30/22 for symptoms of UTI and urinary retention, and was found on CT to have bilateral hydroureter and hydronephrosis with a thickened bladder. He was treated with a course of cephalosporins, and followed up with [facility], and reportedly was started on Flomax, and underwent further antibiotic treatment as an outpatient. He was then hospitalized here from 10/22/22 to 10/24/22 for CAUTI, found to be due to Candida UTI, in the setting of uncontrolled Type 2 Diabetes mellitus. ID was consulted and he received 2 weeks of Fluconazole. After discharge, he was seen by his Urology team in 11/2022 and his catheter was removed. He also has chronic anemia, and an outpatient EGD and colonoscopy were done in 11/2022 by WENDY NDIAYE, and reportedly a colonic mass was seen, as discussed below; he had a follow up CT of chest, abdomen and pelvis at that time as an outpatient (on 11/25/22) which reportedly showed improved bladder wall thickening and distension with improved hydronephrosis bilaterally, as well as prostatomegaly.  Presents with 1-2 weeks of progressively severe fatigue, and a recent fall. In the ED, he has TMax 99.0. He appears disoriented on interview. He is found to be retaining nearly 1000 ml of urine in the ED. He has sinus tachycardia with concomitant leukocytosis,  thrombocytosis, and left shift. Lactate is normal thus far. He has severe STEVE as below. He has acute metabolic acidosis (CO2 17). A Suh catheter was placed and UA shows pyuria. Head CT was negative for ICH. Overall, we suspect UTI causing sepsis and acute septic encephalopathy, as well as STEVE that could be due to ongoing obstruction.   - Urology appreciated, recommends follow up with primary urologist at [facility] as previously scheduled  - Suh catheter to be maintained for now for 2 weeks  - Empiric Ceftriaxone continued. Plan abx treatment for 10-14 day if urine culture +  - Follow up cultures.    - continue flomax     Severe STEVE  Suspect due to obstruction as well as hypovolemia. Cr 3.86 on admit, baseline closer to 1. FENA was 4.7.   * 1/12 Renal US: bilateral hydronephrosis and hydroureter bilaterally. Partially distended urinary bladder with suh in place and dependent debris. Vague echogenic nodule in left kidney, could consider enhanced MRI  - IVF as above, encourage oral intake.   - suspect Cr will improve with IVF, oral intake and suh placement     Acute hypochloremic hyponatremia  122 tonight, and was 139 on 10/7/22 in Care Everywhere. Suspect due to hypovolemia. Na rapidly improved to 131 with initiation of fluids on 1/12, then up to 135  - change IVF to D5 given lower BGs as well  - encourage free water intake  - sodium check q6h, hopeful for Na closer to 129 until midnight tonight, then can rise overnight to normal range    Hyperkalemia- resolved  Secondary to STEVE     Chronic iron deficiency anemia  Of note, recent EGD and colonoscopy were reportedly done 11/2022. He tells me that he was found to have polyps in the colon and also in the small intestine, and has seen a surgeon with recommendation for possible resection of part of his small intestine. He is markedly cachectic, with severe recent weight loss, and could have intestinal cancer. I do not have further details of the endoscopy  "results or the recommended care plan at present. Hgb near 8 in October 2022, after IVF hgb went from 12.1 to 10.4  - Monitor HGB closely while hospitalized.   - Resumed oral iron    Left temporal laceration  Repaired with sutures in ED, expect removal 1/16     Type 2 DM  A1C is 6.6. On Lantus, Metformin, Linagliptin as an outpatient.  - sliding scale insulin  - hold orals and lantus for now given BG 68 early 1/12 AM.     Suspected severe protein calorie malnutrition  - Nutrition services consulted.  - added ensure BID       Diet: Moderate Consistent Carb (60 g CHO per Meal) Diet  Snacks/Supplements Adult: Ensure Enlive; Between Meals    DVT Prophylaxis: Pneumatic Compression Devices  Henderson Catheter: PRESENT, indication:    Lines: None     Cardiac Monitoring: None  Code Status: Full Code      Clinically Significant Risk Factors Present on Admission        # Hyperkalemia: Highest K = 5.5 mmol/L in last 2 days, will monitor as appropriate  # Hyponatremia: Lowest Na = 122 mmol/L in last 2 days, will monitor as appropriate   # Hypercalcemia: corrected calcium is >10.1, will monitor as appropriate    # Hypoalbuminemia: Lowest albumin = 2.3 g/dL at 1/11/2023  9:00 PM, will monitor as appropriate    # Acute Kidney Injury, unspecified: based on a >150% or 0.3 mg/dL increase in last creatinine compared to past 90 day average, will monitor renal function      # DMII: A1C = 6.6 % (Ref range: 0.0 - 5.6 %) within past 3 months    # Cachexia: Estimated body mass index is 17.79 kg/m  as calculated from the following:    Height as of this encounter: 1.778 m (5' 10\").    Weight as of this encounter: 56.2 kg (124 lb).           Disposition Plan      Expected Discharge Date: 01/13/2023                  Ciarra Caballero  Hospitalist Service  [facility]  Securely message with [employer] (more info)  Text page via [employer] Paging/Directory "   ______________________________________________________________________    Interval History   Patient seen and examined. He is tolerating suh catheter well. Tolerating diet, encouraged water intake, he is agreeable. Reviewed sodium and creatinine. Expect discharge over the weekend if stable and creatinine back to normal. Reviewed IVF changes with RN. Reviewed ultrasound results and specialist recommendations    Physical Exam   Vital Signs: Temp: 98  F (36.7  C) Temp src: Axillary BP: 125/65 Pulse: 98   Resp: 19 SpO2: 100 % O2 Device: None (Room air)    Weight: 124 lbs 0 oz    Constitutional: Awake, alert, cooperative, cachetic  Respiratory: Clear to auscultation bilaterally, no crackles or wheezing  Cardiovascular: Regular rate and rhythm, normal S1 and S2, and no murmur noted  GI: Normal bowel sounds, soft, non-distended, non-tender  Skin/Integumen: No rashes, no cyanosis, no edema  Other: Suh catheter with light orange/yellow urine    Medical Decision Making       35 MINUTES SPENT BY ME on the date of service doing chart review, history, exam, documentation & further activities per the note.      Data     I have personally reviewed the following data over the past 24 hrs:    22.1 (H)  \   10.4 (L)   / 517 (H)     135 103 116 (H) /  141 (H)   4.0 18 (L) 3.06 (H) \       ALT: 74 (H) AST: 61 (H) AP: 187 (H) TBILI: 1.1   ALB: 2.3 (L) TOT PROTEIN: 8.8 LIPASE: 64 (L)       Procal: N/A CRP: N/A Lactic Acid: 1.5         Imaging results reviewed over the past 24 hrs:   Recent Results (from the past 24 hour(s))   Head CT w/o contrast    Narrative    EXAM: CT HEAD W/O CONTRAST  LOCATION: [facility]  DATE/TIME: 1/11/2023 4:49 PM    INDICATION: fall, head injury  COMPARISON: None.  TECHNIQUE: Routine CT Head without IV contrast. Multiplanar reformats. Dose reduction techniques were used.    FINDINGS:  INTRACRANIAL CONTENTS: No intracranial hemorrhage, extraaxial collection, or mass effect.  No CT  evidence of acute infarct. Mild presumed chronic small vessel ischemic changes. Normal ventricles and sulci.     VISUALIZED ORBITS/SINUSES/MASTOIDS: No intraorbital abnormality. Previous cataract surgery. No paranasal sinus mucosal disease. No middle ear or mastoid effusion.    BONES/SOFT TISSUES: No acute abnormality.      Impression    IMPRESSION:  1.  Normal head CT.   US Renal Complete Non-Vascular    Narrative    EXAM: US RENAL COMPLETE NON-VASCULAR  LOCATION: [facility]  DATE/TIME: 1/12/2023 2:32 AM    INDICATION: Recurrent obstructive STEVE. UTI. Sepsis.  COMPARISON: CT chest, abdomen and pelvis with IV contrast 11/25/2022.  TECHNIQUE: Routine Bilateral Renal and Bladder Ultrasound.    FINDINGS:    RIGHT KIDNEY: 13.0 x 7.8 x 6.1 cm. Mild hydronephrosis and hydroureter. No stones or masses. Normal parenchymal thickness and echogenicity.     LEFT KIDNEY: 12.0 x 6.9 x 6.2 cm. Mild hydronephrosis and hydroureter. Small milk of calcium cyst in the midportion measures 1.6 x 1.4 x 1.6 cm. Another vague echogenicity slightly inferiorly measures 1.6 cm, in retrospect, corresponding to an   indeterminate hypodense lesion seen on prior CT (image 60, series 3, image 79, series 2). No stones. Normal parenchymal thickness and echogenicity.     BLADDER: Partially distended with a Henderson catheter in situ. Dependent debris in the urinary bladder.      Impression    IMPRESSION:  1.  Bilateral hydronephrosis and hydroureter bilaterally. Partially distended urinary bladder with a Henderson catheter in situ. Dependent debris in the urinary bladder.    2.  Vague echogenic nodule in the left kidney inferiorly, in retrospect corresponding to an indeterminate hypodense lesion seen on prior CT. Consider further assessment using dedicated dynamic enhanced MR, if technically feasible. Milk of calcium cyst in   the left mid kidney, unchanged.    NOTE: ABNORMAL REPORT    THE DICTATION ABOVE DESCRIBES AN ABNORMALITY FOR  WHICH FOLLOW-UP IS NEEDED.

## 2023-01-12 NOTE — ED NOTES
[facility]  ED Nurse Handoff Report    ED Chief complaint: Laceration and Generalized Weakness      ED Diagnosis:   Final diagnoses:   Generalized muscle weakness   Closed head injury, initial encounter   Facial laceration, initial encounter   Urinary retention with incomplete bladder emptying   Acute renal failure, unspecified acute renal failure type (H)       Code Status: Full Code    Allergies:   Allergies   Allergen Reactions     Sulfa Drugs Rash       Patient Story: Patient brought in by EMS from home. Patient lives with wife. EMS reports neighbor went to check on patient and found patient to have a laceration to mid forehead. Patient reports fell 4 days ago and hit head on vacuum . EMS reports house is in disarray. Denies LOC. Denies being on thinners. Patient's blood sugar is 204 prehospital.  Focused Assessment:    Labs Ordered and Resulted from Time of ED Arrival to Time of ED Departure   COMPREHENSIVE METABOLIC PANEL - Abnormal       Result Value    Sodium 122 (*)     Potassium 5.5 (*)     Chloride 90 (*)     Carbon Dioxide (CO2) 17 (*)     Anion Gap 15 (*)     Urea Nitrogen 133 (*)     Creatinine 3.86 (*)     Calcium 9.3      Glucose 202 (*)     Alkaline Phosphatase 187 (*)     AST 61 (*)     ALT 74 (*)     Protein Total 8.8      Albumin 2.3 (*)     Bilirubin Total 1.1      GFR Estimate 16 (*)    LIPASE - Abnormal    Lipase 64 (*)    ROUTINE UA WITH MICROSCOPIC REFLEX TO CULTURE - Abnormal    Color Urine Brown (*)     Appearance Urine Cloudy (*)     Glucose Urine Negative      Bilirubin Urine Negative      Ketones Urine Negative      Specific Gravity Urine 1.010      Blood Urine Moderate (*)     pH Urine 6.5      Protein Albumin Urine 100 (*)     Urobilinogen Urine Normal      Nitrite Urine Negative      Leukocyte Esterase Urine Large (*)     Bacteria Urine Few (*)     WBC Clumps Urine Present (*)     RBC Urine 14 (*)     WBC Urine >182 (*)     Hyaline Casts Urine 56 (*)    CBC  WITH PLATELETS AND DIFFERENTIAL - Abnormal    WBC Count 23.6 (*)     RBC Count 4.42      Hemoglobin 12.1 (*)     Hematocrit 36.6 (*)     MCV 83      MCH 27.4      MCHC 33.1      RDW 14.5      Platelet Count 596 (*)     % Neutrophils 90      % Lymphocytes 5      % Monocytes 4      % Eosinophils 0      % Basophils 0      % Immature Granulocytes 1      NRBCs per 100 WBC 0      Absolute Neutrophils 21.2 (*)     Absolute Lymphocytes 1.1      Absolute Monocytes 0.9      Absolute Eosinophils 0.0      Absolute Basophils 0.1      Absolute Immature Granulocytes 0.3      Absolute NRBCs 0.0     GLUCOSE BY METER - Abnormal    GLUCOSE BY METER POCT 127 (*)    LACTIC ACID WHOLE BLOOD - Normal    Lactic Acid 1.5     FRACTIONAL EXCRETION OF SODIUM    %FENA 4.7      Sodium Urine mmol/L 61      Creatinine Urine mg/dL 41     GLUCOSE MONITOR NURSING POCT   GLUCOSE MONITOR NURSING POCT   GLUCOSE MONITOR NURSING POCT   GLUCOSE MONITOR NURSING POCT   GLUCOSE MONITOR NURSING POCT   URINE CULTURE   BLOOD CULTURE   BLOOD CULTURE   FRACTIONAL EXCRETION OF SODIUM     Head CT w/o contrast   Final Result   IMPRESSION:   1.  Normal head CT.      US Renal Complete Non-Vascular    (Results Pending)       Treatments and/or interventions provided: IV left forearm, left wrist, labs, Henderson catheter 16F, Wound care- Laceration in scalp, and above left eyebrow  Patient's response to treatments and/or interventions: Tolerated    To be done/followed up on inpatient unit:  I/Os, Vitals, denies pain at this time    Does this patient have any cognitive concerns?: none    Activity level - Baseline/Home:  Cane/walker  Activity Level - Current:   Stand with Assist    Patient's Preferred language: English   Needed?: No    Isolation: None  Infection: Not Applicable  Patient tested for COVID 19 prior to admission: NO  Bariatric?: No    Vital Signs:   Vitals:    01/11/23 1526 01/11/23 1957 01/11/23 2215 01/12/23 0056   BP: 106/66 110/76  104/62   Pulse:  "108 105 98 84   Resp: 18 18 11 15   Temp: 97.8  F (36.6  C) 99  F (37.2  C)     TempSrc: Temporal      SpO2: 100%   99%   Weight: 56.2 kg (124 lb)      Height: 1.778 m (5' 10\")          Cardiac Rhythm:     Was the PSS-3 completed:   Yes  What interventions are required if any?               Family Comments: None  OBS brochure/video discussed/provided to patient/family: Yes                  For the majority of the shift this patient's behavior was Green.   Behavioral interventions performed were none.    ED NURSE PHONE NUMBER: [phone]         "

## 2023-01-12 NOTE — PROGRESS NOTES
RECEIVING UNIT ED HANDOFF REVIEW    ED Nurse Handoff Report was reviewed by: Reyna Amaral RN on January 12, 2023 at 2:57 PM

## 2023-01-12 NOTE — ED NOTES
Called patients sister Nakia as well as Nitin Rome, patients next door neighbor and gave both updates. Patient now speaking with sister on phone.

## 2023-01-12 NOTE — PHARMACY-ADMISSION MEDICATION HISTORY
Pharmacy Medication History  Admission medication history interview status for the 1/11/2023  admission is complete. See EPIC admission navigator for prior to admission medications     Location of Interview: Patient room  Medication history sources: Patient    Significant changes made to the medication list: List up to date    In the past week, patient estimated taking medication this percent of the time: greater than 90%    Additional medication history information:     Medication reconciliation completed by provider prior to medication history? No    Time spent in this activity: 15 minutes     Prior to Admission medications    Medication Sig Last Dose Taking? Auth Provider Long Term End Date   ferrous sulfate (FEROSUL) 325 (65 Fe) MG tablet Take 325 mg by mouth daily 1/10/2023 Yes Unknown, Entered By History     insulin glargine (LANTUS PEN) 100 UNIT/ML pen Inject 20 Units Subcutaneous every morning 1/11/2023 at 0930 Yes Unknown, Entered By History     linagliptin (TRADJENTA) 5 MG TABS tablet Take 5 mg by mouth daily 1/11/2023 Yes Unknown, Entered By History Yes    metFORMIN (GLUCOPHAGE XR) 500 MG 24 hr tablet Take 500 mg by mouth 2 times daily 1/11/2023 at 1200 Yes Unknown, Entered By History Yes    tamsulosin (FLOMAX) 0.4 MG capsule Take 1 capsule (0.4 mg) by mouth daily 1/11/2023 at AM Yes Dex Aguirre MD     ONE TOUCH ULTRA TEST VI STRP as directed    Michael Joe MD         The information provided in this note is only as accurate as the sources available at the time of update(s)

## 2023-01-12 NOTE — CONSULTS
[facility] Inpatient Consultation Note    [place] MRN# [id number]   Age: 72 year old YOB: 1950     Date of Admission:  1/11/2023    Reason for consult: Urinary retention        Requesting physician: Dr. Betancourt                 History of Present Illness:   Pt is a 71yo male with past hx of urinary retention, known BPH, hx of UTIs. He has most recently followed with urology at , suh was removed 2mo ago with plan for him to f/u in 1mo for recheck. Next steps were to obtain UDS and possibly move forward with TURP. He has been on flomax. He notes significant nocturia and incontinence, specifically at night, since suh removal; has not felt he has been emptying well. Suh was placed in the ER for >1L of urine. Renal US was obtained and notable for bilateral hydronephrosis, creat is also elevated to 3. UA appears concerning for infection. Upon my bedside evaluation, he denies any discomfort, suh is draining clear urine.     From admission H&P:   [place] is a markedly pleasant 72 year old gentleman who presents with generalized weakness, which he feels started about 2 weeks or so ago, constant and progressive since onset, associated with unsteadiness on his feet. He says that he fell about 6 days ago. He endorses low appetite and little fluid or other PO intake in the past week. He adds that over the past year in fact, he has lost over 30 pounds unintentionally. He has chronic difficulties starting his urine stream, and chronic urinary incontinence that gets worse at night. He appears ill and disoriented at times to time during the interview; in fact it seems he had told the ED his fall was yesterday and when redirected he confirms this other date. He otherwise denies recent fever, chills, or sweats; he denies dysuria or hematuria, or any nausea, vomiting, or constipation or diarrhea, or hematochezia (his stool appears dark which he attributes to iron supplementation); he denies  dizziness or light headedness with standing or walking, he denies any other acute complaints.     In the ED,   01/11 1526 106/66 97.8  F (36.6  C) 108 18 100 %      TMax in the ED 99.0. He was found to have retention on bladder scan of 1000 ml.     CBC and CMP were notable for WBC 23.6, 90% neutrophils, HGB 12.1, , Na 122, Cl 90, K 5.5, CO2 17, , Cr 3.86, AG 15; alb 2.3, aphos 187, alt 74, ast 61, glucose 202, otherwise were within the normal reference range. Lactate was 1.5, Lipase 64. EKG showed sinus tachycardia without ST segment elevations.     A Henderson catheter was placed in the ED. UA showed greater than 182 WBC, 14 RBC. Urine cultures were sent.     He was given IV fluid and Ceftriaxone in the ED.            Past Medical History:     Past Medical History:   Diagnosis Date     Anemia, iron deficiency      Other and unspecified hyperlipidemia      Shingles 2013     Type II or unspecified type diabetes mellitus without mention of complication, not stated as uncontrolled     diet controlled             Past Surgical History:     Past Surgical History:   Procedure Laterality Date     CATARACT IOL, RT/LT  1997    bilaterally             Social History:     Social History     Socioeconomic History     Marital status:      Spouse name: Jihan     Number of children: 1     Years of education: 15     Highest education level: Not on file   Occupational History     Occupation: Planner     Employer: [employer]   Tobacco Use     Smoking status: Former     Types: Cigarettes     Smokeless tobacco: Never   Substance and Sexual Activity     Alcohol use: Not Currently     Comment: few drinks a year     Drug use: No     Sexual activity: Yes     Partners: Female   Other Topics Concern     Not on file   Social History Narrative    ** Merged History Encounter **         Dairy/d 2-4 servings/d.     Caffeine 3-4 servings/d    Exercise 2-3 x week    Sunscreen used - Yes    Seatbelts used - Yes    Working  smoke/CO detectors in the home - Yes    Guns stored in the home - Yes    Self Breast Exams - NA    Self Testicular Exam - Yes    Eye Exam up to date - Yes    Dental Exam up to date - Yes    Pap Smear up to date - NA    Mammogram up to date - NA    PSA up to date - been a couple of years    Dexa Scan up to date - NA    Flex Sig / Colonoscopy up to date - >2-3 years normal    Immunizations up to date - less than 10 years but not sure when    Abuse: Current or Past(Physical, Sexual or Emotional)- No    Do you feel safe in your environment - Yes         Social Determinants of Health     Financial Resource Strain: Not on file   Food Insecurity: Not on file   Transportation Needs: Not on file   Physical Activity: Not on file   Stress: Not on file   Social Connections: Not on file   Intimate Partner Violence: Not on file   Housing Stability: Not on file             Family History:     Family History   Problem Relation Age of Onset     Eye Disorder Mother      Alzheimer Disease Mother      Diabetes Father      Cancer - colorectal Father      Lung Cancer Father      Cancer Maternal Grandmother         kidney     Cancer Maternal Grandfather         lung cancer-smoker and      Cancer Paternal Grandmother         leukemia     Unknown/Adopted Paternal Grandfather              Immunizations:     Immunization History   Administered Date(s) Administered     COVID-19 Vaccine 12+ (Pfizer 2022) 08/19/2022     COVID-19 Vaccine 12+ (Pfizer) 03/19/2021, 04/09/2021     COVID-19 Vaccine Bivalent Booster 12+ (Pfizer) 11/10/2022     Influenza (IIV3) PF 12/05/2001, 10/17/2013     Pneumococcal 23 valent 06/21/2000     TD (ADULT, 7+) 06/04/2003             Allergies:     Allergies   Allergen Reactions     Sulfa Drugs Rash             Medications:     Current Facility-Administered Medications   Medication     acetaminophen (TYLENOL) tablet 650 mg    Or     acetaminophen (TYLENOL) Suppository 650 mg     cefTRIAXone (ROCEPHIN) 2 g vial to  "attach to  ml bag for ADULTS or NS 50 ml bag for PEDS     dextrose 5% and 0.45% NaCl infusion     glucose gel 15-30 g    Or     dextrose 50 % injection 25-50 mL    Or     glucagon injection 1 mg     ferrous sulfate (FEROSUL) tablet 325 mg     insulin aspart (NovoLOG) injection (RAPID ACTING)     insulin aspart (NovoLOG) injection (RAPID ACTING)     lidocaine (LMX4) cream     lidocaine 1 % 0.1-1 mL     melatonin tablet 3 mg     ondansetron (ZOFRAN ODT) ODT tab 4 mg    Or     ondansetron (ZOFRAN) injection 4 mg     senna-docusate (SENOKOT-S/PERICOLACE) 8.6-50 MG per tablet 1 tablet    Or     senna-docusate (SENOKOT-S/PERICOLACE) 8.6-50 MG per tablet 2 tablet     sodium chloride (PF) 0.9% PF flush 3 mL     sodium chloride (PF) 0.9% PF flush 3 mL     tamsulosin (FLOMAX) capsule 0.4 mg     Current Outpatient Medications   Medication Sig     ferrous sulfate (FEROSUL) 325 (65 Fe) MG tablet Take 325 mg by mouth daily     insulin glargine (LANTUS PEN) 100 UNIT/ML pen Inject 20 Units Subcutaneous every morning     linagliptin (TRADJENTA) 5 MG TABS tablet Take 5 mg by mouth daily     metFORMIN (GLUCOPHAGE XR) 500 MG 24 hr tablet Take 500 mg by mouth 2 times daily     tamsulosin (FLOMAX) 0.4 MG capsule Take 1 capsule (0.4 mg) by mouth daily     ONE TOUCH ULTRA TEST VI STRP as directed              Review of Systems:   Comprehensive review of systems from the Admission note dated 1/11/23 at [facility] was reviewed with no changes except per HPI.     Examination:  /63   Pulse 94   Temp 98.4  F (36.9  C) (Oral)   Resp 11   Ht 1.778 m (5' 10\")   Wt 56.2 kg (124 lb)   SpO2 98%   BMI 17.79 kg/m    General: Alert and oriented, no distress  HEENT: Face symmetric, mucous membranes moist and pink  Eyes: No scleral icterus  Neck: Symmetric  Chest wall: Symmetric  Respiratory: Breathing unlabored, no audible wheezing  Cardiac: Extremities warm and well perfused   Abdomen: soft, non tender, non distended "   Back: No CVA or flank tenderness  : suh in place and draining clear urine   Extremities: No evidence of deformities or trauma  Neuro:Grossly non focal  Pysch: Normal mood and affect  Skin: No evident rashes or lesions            Data:     Lab Results   Component Value Date    WBC 22.1 01/12/2023     Lab Results   Component Value Date    RBC 3.76 01/12/2023     Lab Results   Component Value Date    HGB 10.4 01/12/2023     Lab Results   Component Value Date    HCT 31.0 01/12/2023     Lab Results   Component Value Date     01/12/2023     Creatinine   Date Value Ref Range Status   01/12/2023 3.06 (H) 0.66 - 1.25 mg/dL Final   06/26/2006 1.00 0.80 - 1.50 mg/dL Final   ]  Lab Results   Component Value Date     01/12/2023    BUN 12 06/26/2006       Imaging:  Renal US from admission reviewed- bilateral hydronephrosis noted     Impression:  73yo male with known BPH and hx of urinary retention, now admitted with urinary retention of >1L, bilateral hydronephrosis on imaging with STEVE, possible UTI     Plan:  Would cont suh minimum 2wks   Cont flomax   Defer abx to IM, follow cultures and adjust as indicated; if UCx positive then recommend 10-14 day course   Trend labs, expect creat to improve with bladder/renal decompression from suh   RN to closely monitor suh, hand irrigation prn for obstruction     Will need to f/u with his primary urologist at  in the coming weeks for further management- per their notes, planning for eventual UDS and possible future TURP  No plans for surgical intervention this admission     Stephanie Medeiros PA-C  [facility]   [email or web address]  Text Page (7:30am to 4:30pm)

## 2023-01-12 NOTE — ED PROVIDER NOTES
History     Chief Complaint:  Laceration and Generalized Weakness       HPI   South Tovar is a 72 year old male who presents for evaluation following a fall. South states that yesterday he was walking in his home when he tripped and fell. He denies losing consciousness, but he did sustain several lacerations to his right hand, left side of his forehead, and scalp. During evaluation, South notes that prior to his fall he did not have any chest pain, dizziness, or lightheadedness, and he thought that the fall was mechanical in nature. 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 South also complains of some abdominal pain but notes that this is chronic. He denies any headache or the use of any blood thinners.       Independent Historian: yes     Review of External Notes: I reviewed a clinic visit from 11/10/22, a urology visit from 11/18/22, and the discharge summary from 10/24/22.     ROS:  Review of Systems   Cardiovascular: Negative for chest pain.   Gastrointestinal: Positive for abdominal pain (chronic).   Skin: Positive for wound (right hand, forehead, scalp).   Neurological: Positive for weakness. Negative for dizziness, light-headedness and headaches.        (-) loss of consciousness    All other systems reviewed and are negative.        Allergies:  Sulfa drugs     Medications:    Pantoprazole  Zofran  Ferrous sulfate  Linagliptan   Lantus  Metformin  Tamsulosin    Past Medical History:    Iron deficiency anemia  Type II diabetes mellitus  Proptosis  Hyperlipidemia     Past Surgical History:    Cataract IOL bilateral     Family History:    Alzheimer disease - mother  Colorectal cancer - father  Diabetes - father  Eye disorder - mother  Lung cancer - father    Social History:  Patient came from home via EMS.   Patient is unaccompanied in the ED.  Patient has  "history of tobacco use, not current.  Patient denies alcohol use.    Physical Exam     Patient Vitals for the past 24 hrs:   BP Temp Temp src Pulse Resp SpO2 Height Weight   01/11/23 2215 -- -- -- 98 11 -- -- --   01/11/23 1957 110/76 99  F (37.2  C) -- 105 18 -- -- --   01/11/23 1526 106/66 97.8  F (36.6  C) Temporal 108 18 100 % 1.778 m (5' 10\") 56.2 kg (124 lb)        Physical Exam  General: Patient is alert and sitting up in a chair in the hallway space due to overcrowding in the emergency department  HEENT: Head laceration to his forehead with crusted blood overlying it   Eyes: pupils equal and reactive. Conjunctiva clear   Nares: patent   Oropharynx: no lesions, uvula midline, no palatal draping, normal voice, no trismus  Neck: Supple without lymphadenopathy, no meningismus  Chest: Tachycardic but regular  Lungs: Equal clear to auscultation with no wheeze or rales  Abdomen: Soft, distended lower abdomen with tenderness to palpation nondistended, normal bowel sounds  Back: No costovertebral angle tenderness, no midline C, T or L spine tenderness  Neuro: Grossly nonfocal, normal speech, strength equal bilaterally, CN 2-12 intact  Extremities: Generalized weakness with inability to ambulate due to generalized weakness no deformities, equal radial and DP pulses. No clubbing, cyanosis.  No edema  Skin: Warm and dry with no rash.       Emergency Department Course   ECG  ECG taken at 2105, ECG read at 2112  Sinus tachycardia  Cannot rule out anterior infarct, age undetermined  Abnormal ECG   No significant differences as compared to prior, dated 9/30/22.  Rate 106 bpm. OH interval 190 ms. QRS duration 78 ms. QT/QTc 326/433 ms. P-R-T axes 90 69 77.     Imaging:  Head CT w/o contrast   Final Result   IMPRESSION:   1.  Normal head CT.         Report per radiology    Laboratory:  Labs Ordered and Resulted from Time of ED Arrival to Time of ED Departure   COMPREHENSIVE METABOLIC PANEL - Abnormal       Result Value    " Sodium 122 (*)     Potassium 5.5 (*)     Chloride 90 (*)     Carbon Dioxide (CO2) 17 (*)     Anion Gap 15 (*)     Urea Nitrogen 133 (*)     Creatinine 3.86 (*)     Calcium 9.3      Glucose 202 (*)     Alkaline Phosphatase 187 (*)     AST 61 (*)     ALT 74 (*)     Protein Total 8.8      Albumin 2.3 (*)     Bilirubin Total 1.1      GFR Estimate 16 (*)    LIPASE - Abnormal    Lipase 64 (*)    CBC WITH PLATELETS AND DIFFERENTIAL - Abnormal    WBC Count 23.6 (*)     RBC Count 4.42      Hemoglobin 12.1 (*)     Hematocrit 36.6 (*)     MCV 83      MCH 27.4      MCHC 33.1      RDW 14.5      Platelet Count 596 (*)     % Neutrophils 90      % Lymphocytes 5      % Monocytes 4      % Eosinophils 0      % Basophils 0      % Immature Granulocytes 1      NRBCs per 100 WBC 0      Absolute Neutrophils 21.2 (*)     Absolute Lymphocytes 1.1      Absolute Monocytes 0.9      Absolute Eosinophils 0.0      Absolute Basophils 0.1      Absolute Immature Granulocytes 0.3      Absolute NRBCs 0.0     LACTIC ACID WHOLE BLOOD - Normal    Lactic Acid 1.5     ROUTINE UA WITH MICROSCOPIC REFLEX TO CULTURE      Procedures:  [facility] Procedure Note        Laceration Repair:    Performed by: Zulema Morin MD  Authorized by: Zulema Morin MD  Consent given by: Patient who states understanding of the procedure being performed after discussing the risks, benefits and alternatives.    Preparation: Patient was prepped and draped in usual sterile fashion.  Irrigation solution: saline    Body area:face  Laceration length: 2cm  Contamination: The wound is not contaminated.  Foreign bodies:none  Tendon involvement: none  Anesthesia: Local  Local anesthetic: Lidocaine     1%, with epinephrine  Anesthetic total: 1ml    Debridement: none  Skin closure: Closed with 3 x 5.0 Prolene  Technique: interrupted  Approximation: close  Approximation difficulty: simple    Patient tolerance: Patient tolerated the procedure well with no immediate  complications.      Emergency Department Course & Assessments:       Interventions:  Ordered NS 1 L IV     Independent Interpretation (X-rays, CTs, rhythm strip):  CT scan of the head reviewed by me    Consultations/Discussion of Management or Tests:  2040 I obtained history and examined the patient as noted above.  I requested charge nurse to place the patient in a room as I thought he was quite ill  2100 I reevaluated the patient who showed distention on bladder scan and requested Henderson catheter  2210 I discussed with Dr. 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 Betancourt.     Impression & Plan    CMS Diagnoses: None      Medical Decision Making:  Patient is a 72-year-old male who presents to the emergency department following fall with head laceration 4 days ago.  Patient states he has been feeling generally weak and is unsure exactly why he fell but he lost his balance and fell hitting his forehead on a vacuum .  Denies loss of consciousness.  Denies taking blood thinning medications.  Patient denies severe headache.  Head CT scan was obtained and ruled out intracranial hemorrhage or trauma.  Patient was found to be quite ill with tachycardia, low-grade fever and bladder distention.  He was retaining 1000 cc of urine on bladder scan.  Henderson catheter was placed and reveals urinary tract infection.  In addition he was found to have postobstructive uropathy with elevated creatinine of 3.8.  He was given IV fluids for hydration with improvement of his heart rate.  Blood cultures were sent and Rocephin was given for UTI.  Patient's facial laceration is 4 days old but quite gaping and has a flap.  Was well irrigated here in the emergency department and then I placed 3 simple interrupted sutures to tack it in place to aid  healing.  He understands a slightly higher risk of infection given delayed repair.  Do not see signs of stroke on examination.  He has equal strength bilaterally, normal speech, no change in vision and no facial droop.  Patient's abdomen is quite benign following decompression of his bladder and do not feel he needs CT scan to rule out intra-abdominal catastrophe at this time.  Plan is for admission for IV antibiotics, awaiting cultures, continued care and treatment for medical optimization and treatment of his hyponatremia as well as acute kidney injury.  Patient is agreeable with this plan and all questions and concerns addressed.      Diagnosis:    ICD-10-CM    1. Generalized muscle weakness  M62.81       2. Closed head injury, initial encounter  S09.90XA       3. Facial laceration, initial encounter  S01.81XA       4. Urinary retention with incomplete bladder emptying  R33.9       5. Acute renal failure, unspecified acute renal failure type (H)  N17.9              Scribe Disclosure:  I, Lauren Harris, am serving as a scribe at 8:43 PM on 1/11/2023 to document services personally performed by Zulema Morin MD based on my observations and the provider's statements to me.     1/11/2023   Zulema Morin MD Neuner, Maria Bea, MD  01/11/23 5449

## 2023-01-13 ENCOUNTER — APPOINTMENT (OUTPATIENT)
Dept: PHYSICAL THERAPY | Facility: CLINIC | Age: 73
DRG: 871 | End: 2023-01-13
Attending: HOSPITALIST
Payer: COMMERCIAL

## 2023-01-13 LAB
ANION GAP SERPL CALCULATED.3IONS-SCNC: 12 MMOL/L (ref 3–14)
BUN SERPL-MCNC: 81 MG/DL (ref 7–30)
CALCIUM SERPL-MCNC: 8 MG/DL (ref 8.5–10.1)
CHLORIDE BLD-SCNC: 99 MMOL/L (ref 94–109)
CO2 SERPL-SCNC: 18 MMOL/L (ref 20–32)
CREAT SERPL-MCNC: 2.14 MG/DL (ref 0.66–1.25)
ERYTHROCYTE [DISTWIDTH] IN BLOOD BY AUTOMATED COUNT: 14.6 % (ref 10–15)
GFR SERPL CREATININE-BSD FRML MDRD: 32 ML/MIN/1.73M2
GLUCOSE BLD-MCNC: 252 MG/DL (ref 70–99)
GLUCOSE BLDC GLUCOMTR-MCNC: 224 MG/DL (ref 70–99)
GLUCOSE BLDC GLUCOMTR-MCNC: 237 MG/DL (ref 70–99)
GLUCOSE BLDC GLUCOMTR-MCNC: 253 MG/DL (ref 70–99)
GLUCOSE BLDC GLUCOMTR-MCNC: 271 MG/DL (ref 70–99)
GLUCOSE BLDC GLUCOMTR-MCNC: 424 MG/DL (ref 70–99)
HCT VFR BLD AUTO: 29.2 % (ref 40–53)
HGB BLD-MCNC: 10.1 G/DL (ref 13.3–17.7)
MAGNESIUM SERPL-MCNC: 2.4 MG/DL (ref 1.6–2.3)
MCH RBC QN AUTO: 28.1 PG (ref 26.5–33)
MCHC RBC AUTO-ENTMCNC: 34.6 G/DL (ref 31.5–36.5)
MCV RBC AUTO: 81 FL (ref 78–100)
PHOSPHATE SERPL-MCNC: 3.3 MG/DL (ref 2.5–4.5)
PLATELET # BLD AUTO: 482 10E3/UL (ref 150–450)
POTASSIUM BLD-SCNC: 3.5 MMOL/L (ref 3.4–5.3)
RBC # BLD AUTO: 3.6 10E6/UL (ref 4.4–5.9)
SODIUM SERPL-SCNC: 129 MMOL/L (ref 133–144)
SODIUM SERPL-SCNC: 129 MMOL/L (ref 133–144)
SODIUM SERPL-SCNC: 131 MMOL/L (ref 133–144)
WBC # BLD AUTO: 25.2 10E3/UL (ref 4–11)

## 2023-01-13 PROCEDURE — 36415 COLL VENOUS BLD VENIPUNCTURE: CPT | Performed by: HOSPITALIST

## 2023-01-13 PROCEDURE — 258N000003 HC RX IP 258 OP 636: Performed by: HOSPITALIST

## 2023-01-13 PROCEDURE — 250N000011 HC RX IP 250 OP 636: Performed by: HOSPITALIST

## 2023-01-13 PROCEDURE — 85027 COMPLETE CBC AUTOMATED: CPT | Performed by: HOSPITALIST

## 2023-01-13 PROCEDURE — 97161 PT EVAL LOW COMPLEX 20 MIN: CPT | Mod: GP

## 2023-01-13 PROCEDURE — 80048 BASIC METABOLIC PNL TOTAL CA: CPT | Performed by: HOSPITALIST

## 2023-01-13 PROCEDURE — 97530 THERAPEUTIC ACTIVITIES: CPT | Mod: GP

## 2023-01-13 PROCEDURE — 250N000013 HC RX MED GY IP 250 OP 250 PS 637: Performed by: HOSPITALIST

## 2023-01-13 PROCEDURE — 83735 ASSAY OF MAGNESIUM: CPT | Performed by: HOSPITALIST

## 2023-01-13 PROCEDURE — 84100 ASSAY OF PHOSPHORUS: CPT | Performed by: HOSPITALIST

## 2023-01-13 PROCEDURE — 250N000012 HC RX MED GY IP 250 OP 636 PS 637: Performed by: HOSPITALIST

## 2023-01-13 PROCEDURE — 120N000001 HC R&B MED SURG/OB

## 2023-01-13 PROCEDURE — 84295 ASSAY OF SERUM SODIUM: CPT | Performed by: HOSPITALIST

## 2023-01-13 PROCEDURE — 99232 SBSQ HOSP IP/OBS MODERATE 35: CPT | Performed by: HOSPITALIST

## 2023-01-13 RX ORDER — SODIUM CHLORIDE, SODIUM LACTATE, POTASSIUM CHLORIDE, CALCIUM CHLORIDE 600; 310; 30; 20 MG/100ML; MG/100ML; MG/100ML; MG/100ML
INJECTION, SOLUTION INTRAVENOUS CONTINUOUS
Status: DISCONTINUED | OUTPATIENT
Start: 2023-01-13 | End: 2023-01-16

## 2023-01-13 RX ORDER — SODIUM CHLORIDE AND POTASSIUM CHLORIDE 150; 450 MG/100ML; MG/100ML
INJECTION, SOLUTION INTRAVENOUS CONTINUOUS
Status: DISCONTINUED | OUTPATIENT
Start: 2023-01-13 | End: 2023-01-13

## 2023-01-13 RX ADMIN — INSULIN GLARGINE 10 UNITS: 100 INJECTION, SOLUTION SUBCUTANEOUS at 16:21

## 2023-01-13 RX ADMIN — TAMSULOSIN HYDROCHLORIDE 0.4 MG: 0.4 CAPSULE ORAL at 10:09

## 2023-01-13 RX ADMIN — FERROUS SULFATE TAB 325 MG (65 MG ELEMENTAL FE) 325 MG: 325 (65 FE) TAB at 10:09

## 2023-01-13 RX ADMIN — INSULIN ASPART 3 UNITS: 100 INJECTION, SOLUTION INTRAVENOUS; SUBCUTANEOUS at 09:21

## 2023-01-13 RX ADMIN — CEFTRIAXONE SODIUM 2 G: 2 INJECTION, POWDER, FOR SOLUTION INTRAMUSCULAR; INTRAVENOUS at 20:34

## 2023-01-13 RX ADMIN — DEXTROSE MONOHYDRATE: 50 INJECTION, SOLUTION INTRAVENOUS at 02:46

## 2023-01-13 RX ADMIN — INSULIN ASPART 2 UNITS: 100 INJECTION, SOLUTION INTRAVENOUS; SUBCUTANEOUS at 17:02

## 2023-01-13 RX ADMIN — INSULIN ASPART 6 UNITS: 100 INJECTION, SOLUTION INTRAVENOUS; SUBCUTANEOUS at 13:04

## 2023-01-13 RX ADMIN — POTASSIUM CHLORIDE AND SODIUM CHLORIDE: 450; 150 INJECTION, SOLUTION INTRAVENOUS at 15:27

## 2023-01-13 RX ADMIN — SODIUM CHLORIDE, POTASSIUM CHLORIDE, SODIUM LACTATE AND CALCIUM CHLORIDE: 600; 310; 30; 20 INJECTION, SOLUTION INTRAVENOUS at 19:00

## 2023-01-13 ASSESSMENT — ACTIVITIES OF DAILY LIVING (ADL)
ADLS_ACUITY_SCORE: 41
ADLS_ACUITY_SCORE: 41
ADLS_ACUITY_SCORE: 43
ADLS_ACUITY_SCORE: 41
ADLS_ACUITY_SCORE: 43
ADLS_ACUITY_SCORE: 41
ADLS_ACUITY_SCORE: 41
ADLS_ACUITY_SCORE: 43
ADLS_ACUITY_SCORE: 41
ADLS_ACUITY_SCORE: 41

## 2023-01-13 NOTE — PROGRESS NOTES
Discussed   Henri, your mammogram is negative.  Sulema Powell MD.   UROLOGY PROGRESS NOTE    January 13, 2023    SUBJECTIVE:  Doing well overall.  Denies abd pain.  No N/V.  Tolerating suh with good output.  Light lida urine output.    Cr 3.06 -> 2.14  WBC 22.1 -> 25.2    OBJECTIVE:  Temp:  [97.8  F (36.6  C)-99  F (37.2  C)] 99  F (37.2  C)  Pulse:  [] 104  Resp:  [10-28] 20  BP: ()/(56-76) 99/68  SpO2:  [97 %-100 %] 99 %    Intake/Output Summary (Last 24 hours) at 1/13/2023 0951  Last data filed at 1/13/2023 0623  Gross per 24 hour   Intake 0 ml   Output 1925 ml   Net -1925 ml       GENERAL:  In bed, no acute distress  HEAD: Normocephalic atraumatic  SCLERA: Anicteric  EXTREMITIES: Warm and well perfused  : suh in place with light lida urine in drainage bag, no hematuria or clots noted    LABS:  Lab Results   Component Value Date    WBC 25.2 01/13/2023     Lab Results   Component Value Date    HGB 10.1 01/13/2023     Lab Results   Component Value Date    HCT 29.2 01/13/2023     Lab Results   Component Value Date     01/13/2023     Last Basic Metabolic Panel:  Lab Results   Component Value Date     01/13/2023     06/26/2006      Lab Results   Component Value Date    POTASSIUM 3.5 01/13/2023    POTASSIUM 4.6 06/26/2006     Lab Results   Component Value Date    CHLORIDE 99 01/13/2023    CHLORIDE 104 06/26/2006     Lab Results   Component Value Date    MARNI 8.0 01/13/2023    MARNI 9.3 06/26/2006     Lab Results   Component Value Date    CO2 18 01/13/2023    CO2 28 06/26/2006     Lab Results   Component Value Date    BUN 81 01/13/2023    BUN 12 06/26/2006     Lab Results   Component Value Date    CR 2.14 01/13/2023    CR 1.00 06/26/2006     Lab Results   Component Value Date     01/13/2023     01/13/2023     06/26/2006       ASSESSMENT: 73yo male with known BPH and hx of urinary retention, now admitted with urinary retention of >1L, bilateral hydronephrosis on imaging with STEVE, possible UTI.  Follows with [facility]  urology.     Plan:  - Continue suh for minimum of 2 weeks (placed late on 1/11)  - Continue tamsulosin  - Trend Cr -- improving thus far  - Monitor for post-obstructive diuresis  - UCx pending, antibiotics per IM (10-14 day course pending UCx results)  - Consider repeat imaging to assess for resolution of hydro as outpatient  - Will need to f/u with his primary urologist at  in the coming weeks for further management -- per their notes, planning for eventual UDS and possible future TURP  - No plans for surgical intervention this admission       Urology will sign off at this time.  Please call office at [phone] to have the appropriate on-call provider paged if further questions arise.       Brianna Garcia PA-C  [facility]  Office Phone: [phone]  After 4pm and on weekends, please call [phone]

## 2023-01-13 NOTE — CONSULTS
CLINICAL NUTRITION SERVICES  -  ASSESSMENT NOTE      Recommendations Ordered by Registered Dietitian (RD):   Vanilla Ensure BID between meals  Encouraged small meals TID + 2 supplements/day for repletion    Malnutrition:   % Weight Loss:  > 10% in 6 months (severe malnutrition)  % Intake:  </= 50% for >/= 5 days (severe malnutrition)  Subcutaneous Fat Loss:  Orbital region severe depletion and Upper arm region severe depletion  Muscle Loss:  Temporal region severe depletion, Clavicle bone region severe depletion, Acromion bone region severe depletion and Dorsal hand region severe depletion  Fluid Retention:  None noted    Malnutrition Diagnosis: Severe malnutrition  In Context of:  Acute illness or injury  Chronic illness or disease       REASON FOR ASSESSMENT  South Tovar is a 72 year old male seen by Registered Dietitian for Provider Order - suspected protein calorie malnutrition, severe      NUTRITION HISTORY  - Information obtained from chart review and patient at bedside this afternoon.   - Noted patient lives with spouse and he is her primary caretaker. On admission reported decreased appetite and poor oral intake over the past week. Overall with weight loss over the past year.   - Patient states he lost weight from about May to September 2022 due to diagnosis of diabetes and UTI. He typically eats 3 meals/day though portions seem quite small. Drinks Ensure or Boost supplements 1x/day. Reports his UBW was ~152 lb.   - No known food allergies noted.       CURRENT NUTRITION ORDERS  Diet Order:     Moderate Consistent Carb   Strawberry Ensure between meals      Current Intake/Tolerance:  Ate about 1/2 chicken noodle soup and 1/2 pudding for lunch today. Drank 1 Ensure this morning, plans to drink his afternoon supplement a little later. Prefers vanilla Ensure flavor.       NUTRITION FOCUSED PHYSICAL ASSESSMENT FOR DIAGNOSING MALNUTRITION)  Yes          Observed:    Muscle wasting (refer to documentation in  "Malnutrition section) and Subcutaneous fat loss (refer to documentation in Malnutrition section)    ANTHROPOMETRICS  Height: 5' 10\"  Weight: 119 lbs 11.36 oz  Body mass index is 17.18 kg/m .  Weight Status:  Underweight BMI <18.5  IBW: 75.5 kg (166 lb) +/- 10%  % IBW: 72%  Weight History: Down 20# in the past 3-4 months (14% weight loss).   Wt Readings from Last 10 Encounters:   01/13/23 54.3 kg (119 lb 11.4 oz)   10/23/22 56.5 kg (124 lb 9 oz)   09/30/22 63 kg (139 lb)   07/16/13 83.6 kg (184 lb 6.4 oz)   07/03/13 83.9 kg (185 lb)   06/05/06 97.1 kg (214 lb)       LABS  Labs reviewed  Na 129 (L)  BUN 81 (H), Cr 2.14 (H) - pt admitted with STEVE    Recent Labs   Lab 01/13/23  1255 01/13/23  0713 01/13/23  0621 01/13/23  0210 01/12/23  2325 01/12/23  2112   * 252* 271* 253* 259* 283*       MEDICATIONS  Medications reviewed  Medium sliding scale insulin   Lantus 10 units today, 18 units daily to start tomorrow morning       ASSESSED NUTRITION NEEDS PER APPROVED PRACTICE GUIDELINES:    Dosing Weight 54.3 kg (1/13)  Estimated Energy Needs: 3952-8494+ kcals (30-35 Kcal/Kg)  Justification: repletion  Estimated Protein Needs: 70-80+ grams protein (1.3-1.5 g pro/Kg)  Justification: Repletion  Estimated Fluid Needs: 7295-1899  mL (1 mL/Kcal)  Justification: maintenance or per provider pending fluid status       NUTRITION DIAGNOSIS:  Unintended weight loss related to decreased oral intake as evidenced by 14% weight loss in the past 3-6 months with evidence of severe fat and muscle wasting       NUTRITION INTERVENTIONS  Recommendations / Nutrition Prescription  Vanilla Ensure BID between meals  Encouraged small meals TID + 2 supplements/day for repletion       Implementation  Nutrition education: Provided education on RD and role of care. Discussed nutrition recommendations for weight repletion.   Medical Food Supplement - ordered as above       Nutrition Goals  Patient will consume >/= 75% meals TID + 2 supplements/day "   Weight >/= 54 kg this admission       MONITORING AND EVALUATION:  Progress towards goals will be monitored and evaluated per protocol and Practice Guidelines      Melissa Izaguirre RD, LD

## 2023-01-13 NOTE — PROGRESS NOTES
Alert and oriented x4. VSS on RA. Denies SOB. Diminished lung sounds, infrequent cough. Was encouraged to use the IS. Tolerating mod carb diet. Denies nausea and vomiting. Active bowel sounds x4q. Henderson catheter in place, draining adequately. Denies pain.

## 2023-01-13 NOTE — DISCHARGE INSTRUCTIONS
Helpful Contact: Urology Teams    [facility]   Office Phone: [phone] (you were seen by: Brianna Garcia PA-C)    [facility]  (your normal urology team)   Office Phone: [phone] (address: [address] [place] [address]  )

## 2023-01-13 NOTE — PLAN OF CARE
Goal Outcome Evaluation:      Plan of Care Reviewed With: patient        AOX4, VSS on RA. Denies pain, n/v/d. PIV infusing. On intermittent abx. Henderson catheter patent and draining, COntinue to push IS use. Up w/ 1 GB&W to bathroom.

## 2023-01-13 NOTE — PROGRESS NOTES
[facility]    Medicine Progress Note - Hospitalist Service    Date of Admission:  1/11/2023    Assessment & Plan   South Tovar is a markedly pleasant 72 year old gentleman with past medical history that is most notable for chronic urinary retention, chronic iron deficiency anemia, and Type 2 Diabetes mellitus, who presents with progressive weakness and fall and is found to have suspected sepsis and acute septic encephalopathy due to UTI, as well as severe suspected obstructive STEVE and acute hyponatremia.     UTI causing sepsis, acute septic encephalopathy, and likely Klebsiella bacteremia 1/2 bottles so far  Of note, Mr. Tovar was seen in our ED on 9/30/22 for symptoms of UTI and urinary retention, and was found on CT to have bilateral hydroureter and hydronephrosis with a thickened bladder. He was treated with a course of cephalosporins, and followed up with [facility], and reportedly was started on Flomax, and underwent further antibiotic treatment as an outpatient. He was then hospitalized here from 10/22/22 to 10/24/22 for CAUTI, found to be due to Candida UTI, in the setting of uncontrolled Type 2 Diabetes mellitus. ID was consulted and he received 2 weeks of Fluconazole. After discharge, he was seen by his Urology team in 11/2022 and his catheter was removed. He also has chronic anemia, and an outpatient EGD and colonoscopy were done in 11/2022 by WENDY NDIAYE, and reportedly a colonic mass was seen, as discussed below; he had a follow up CT of chest, abdomen and pelvis at that time as an outpatient (on 11/25/22) which reportedly showed improved bladder wall thickening and distension with improved hydronephrosis bilaterally, as well as prostatomegaly.  Presents with 1-2 weeks of progressively severe fatigue, and a recent fall. In the ED, he has TMax 99.0. He appears disoriented on interview. He is found to be retaining nearly 1000 ml of urine in the ED. He has sinus  tachycardia with concomitant leukocytosis, thrombocytosis, and left shift. Lactate is normal thus far. He has severe STEVE as below. He has acute metabolic acidosis (CO2 17). A Suh catheter was placed and UA shows pyuria. Head CT was negative for ICH. Overall, we suspect UTI causing sepsis and acute septic encephalopathy, as well as STEVE that could be due to ongoing obstruction.   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     Severe STEVE- improving  Suspect due to obstruction as well as hypovolemia. Cr 3.86 on admit, baseline closer to 1. FENA was 4.7.   * 1/12 Renal US: bilateral hydronephrosis and hydroureter bilaterally. Partially distended urinary bladder with suh in place and dependent debris. Vague echogenic nodule in left kidney, could consider enhanced MRI  *1/13 Cr 2.14  - IVF as above, encourage oral intake.   - suspect Cr will improve with IVF, oral intake and suh placement     Acute hypochloremic hyponatremia- improving  122 tonight, and was 139 on 10/7/22 in Care Everywhere. Suspect due to hypovolemia. Na rapidly improved to 131 with initiation of fluids on 1/12, then up to 135, stable at 129 on 1/13  - encourage free water intake  - goal normal sodium on 1/14    Hyperkalemia- resolved  Secondary to STEVE     Chronic iron deficiency anemia  Of note, recent EGD and colonoscopy were reportedly done 11/2022. He tells me that he was found to have  "polyps in the colon and also in the small intestine, and has seen a surgeon with recommendation for possible resection of part of his small intestine. He is markedly cachectic, with severe recent weight loss, and could have intestinal cancer. I do not have further details of the endoscopy results or the recommended care plan at present. Hgb near 8 in October 2022, after IVF hgb went from 12.1 to 10.4  - Monitor HGB closely while hospitalized.   - Resumed oral iron    Left temporal laceration  Repaired with sutures in ED, expect removal 1/16     Type 2 DM  A1C is 6.6. On Lantus 20 units qam, Metformin, Linagliptin as an outpatient.  - sliding scale insulin  - given 10 units lantus x1 on 1/13, then 18 units lantus starting 1/14 AM      Severe protein calorie malnutrition  - Nutrition services appreciated  - Ensure BID between meals       Diet: Moderate Consistent Carb (60 g CHO per Meal) Diet  Snacks/Supplements Adult: Ensure Enlive; Between Meals    DVT Prophylaxis: Pneumatic Compression Devices  Henderson Catheter: PRESENT, indication: Retention  Lines: None     Cardiac Monitoring: None  Code Status: Full Code      Clinically Significant Risk Factors        # Hyperkalemia: Highest K = 5.5 mmol/L in last 2 days, will monitor as appropriate  # Hyponatremia: Lowest Na = 122 mmol/L in last 2 days, will monitor as appropriate   # Hypercalcemia: corrected calcium is >10.1, will monitor as appropriate    # Hypoalbuminemia: Lowest albumin = 2.3 g/dL at 1/11/2023  9:00 PM, will monitor as appropriate           # DMII: A1C = 6.6 % (Ref range: 0.0 - 5.6 %) within past 3 months, PRESENT ON ADMISSION  # Cachexia: Estimated body mass index is 17.18 kg/m  as calculated from the following:    Height as of this encounter: 1.778 m (5' 10\").    Weight as of this encounter: 54.3 kg (119 lb 11.4 oz)., PRESENT ON ADMISSION  # Severe Malnutrition: based on nutrition assessment, PRESENT ON ADMISSION       Disposition Plan      Expected " Discharge Date: 01/15/2023                  Ciarra Federico  Hospitalist Service  [facility]  Securely message with Monica (more info)  Text page via [employer] Paging/Directory   ______________________________________________________________________    Interval History   Patient seen and examined. He is no longer encephalopathic. He is a bit tired, but feeling better. Urine starting to clear. Discussed improvement of creatinine and plans to likely monitor over the weekend and consider discharge maybe Monday if stable. Reviewed plan for therapy evals (they have since recommended TCU) as want to ensure he is safe to discharge. He denies fevers, chills, headache, chest pain or shortness of breath. He reports liking the ensure that was provided, but reports continued decreased appetite. Discussed with RN    Physical Exam   Vital Signs: Temp: 98  F (36.7  C) Temp src: Oral BP: 117/55 Pulse: 98   Resp: 18 SpO2: 100 % O2 Device: None (Room air)    Weight: 119 lbs 11.36 oz    Constitutional: Awake, alert, cooperative, cachetic  Respiratory: Clear to auscultation bilaterally, no crackles or wheezing  Cardiovascular: Regular rate and rhythm, normal S1 and S2, and no murmur noted  GI: Normal bowel sounds, soft, non-distended, non-tender  Skin/Integumen: No rashes, no cyanosis, no edema  Other: Henderson catheter with light orange/yellow urine. Oriented x4    Medical Decision Making       35 MINUTES SPENT BY ME on the date of service doing chart review, history, exam, documentation & further activities per the note.      Data     I have personally reviewed the following data over the past 24 hrs:    25.2 (H)  \   10.1 (L)   / 482 (H)     131 (L) 99 81 (H) /  224 (H)   3.5 18 (L) 2.14 (H) \       Imaging results reviewed over the past 24 hrs:   No results found for this or any previous visit (from the past 24 hour(s)).

## 2023-01-13 NOTE — PROGRESS NOTES
01/13/23 1614   Appointment Info   Signing Clinician's Name / Credentials (PT) Violet Camacho, SAKINA   Living Environment   People in Home spouse   Current Living Arrangements house   Home Accessibility stairs to enter home   Number of Stairs, Main Entrance 7   Stair Railings, Main Entrance railings safe and in good condition   Transportation Anticipated family or friend will provide   Living Environment Comments Pt lives with his wife, reports she cannot provide much assist, but other family members may be able to   Self-Care   Usual Activity Tolerance good   Current Activity Tolerance poor   Equipment Currently Used at Home walker, standard;cane, straight   Fall history within last six months yes   Number of times patient has fallen within last six months 5   Activity/Exercise/Self-Care Comment Pt reports IND at baseline, does not use AD   General Information   Onset of Illness/Injury or Date of Surgery 01/11/23   Referring Physician Ciarra Caballero   Patient/Family Therapy Goals Statement (PT) Return home   Pertinent History of Current Problem (include personal factors and/or comorbidities that impact the POC) Per chart: South Tovar is a markedly pleasant 72 year old gentleman with past medical history that is most notable for chronic urinary retention, chronic iron deficiency anemia, and Type 2 Diabetes mellitus, who presents with progressive weakness and fall and is found to have suspected sepsis and acute septic encephalopathy due to UTI, as well as severe suspected obstructive STEVE and acute hyponatremia.   Existing Precautions/Restrictions fall   General Observations Pt supine in bed upon therapist arrival, agreeable to PT w/ encouragement   Cognition   Affect/Mental Status (Cognition) WFL   Orientation Status (Cognition) oriented x 4   Follows Commands (Cognition) WFL   Pain Assessment   Patient Currently in Pain No   Integumentary/Edema   Integumentary/Edema Comments Laceration on forehead    Posture    Posture Forward head position;Protracted shoulders   Range of Motion (ROM)   Range of Motion ROM is WFL   Strength (Manual Muscle Testing)   Strength (Manual Muscle Testing) Deficits observed during functional mobility   Bed Mobility   Comment, (Bed Mobility) Supine to sit Min A   Transfers   Comment, (Transfers) Sit to stand Min A   Gait/Stairs (Locomotion)   Comment, (Gait/Stairs) Pt amb w/ FWW and CGA   Balance   Balance Comments Fair seated and standing w/ FWW   Sensory Examination   Sensory Perception patient reports no sensory changes   Clinical Impression   Criteria for Skilled Therapeutic Intervention Yes, treatment indicated   PT Diagnosis (PT) Impaired functional mobility and gait   Influenced by the following impairments Pain, weakness, decreased activity tolerance, impaired balance   Functional limitations due to impairments Limited functional mobility requiring AD and assist   Clinical Presentation (PT Evaluation Complexity) Stable/Uncomplicated   Clinical Presentation Rationale Based on PMH, current status, and social support   Clinical Decision Making (Complexity) low complexity   Planned Therapy Interventions (PT) balance training;bed mobility training;gait training;stair training;strengthening;transfer training;progressive activity/exercise   Risk & Benefits of therapy have been explained evaluation/treatment results reviewed;care plan/treatment goals reviewed;risks/benefits reviewed;current/potential barriers reviewed;participants voiced agreement with care plan;participants included;patient   PT Total Evaluation Time   PT Eval, Low Complexity Minutes (23678) 10   Physical Therapy Goals   PT Frequency 5x/week   PT Predicted Duration/Target Date for Goal Attainment 01/20/23   PT Goals Bed Mobility;Transfers;Gait;Stairs   PT: Bed Mobility Modified independent;Supine to/from sit   PT: Transfers Supervision/stand-by assist;Sit to/from stand;Assistive device   PT: Gait Supervision/stand-by  "assist;Assistive device;50 feet   PT: Stairs Minimal assist;7 stairs   Interventions   Interventions Quick Adds Therapeutic Activity   Therapeutic Activity   Therapeutic Activities: dynamic activities to improve functional performance Minutes (39043) 8   Symptoms Noted During/After Treatment Fatigue   Treatment Detail/Skilled Intervention Pt poorly motivated throughout session, required extensive encouragment from therapist and RN. Pt repeatedly responding to questions w/ \"whatever.\" Pt performed sit to stand from EOB w/ FWW and Min A. Pt amb 10' w/ FWW and CGA, pt reported fatigue, sat in recliner. Pt refusing further mobility despite encouragment. Pt in recliner at end of session, SpO2 100%, all needs w/in reach, feet elevated, chair alarm on, RN updated.   PT Discharge Planning   PT Plan Progress bed mob, transfers, and amb w/ WC follow, trial stairs when appropriate   PT Discharge Recommendation (DC Rec) Transitional Care Facility   PT Rationale for DC Rec Pt is below baseline, currently Ax1 for mobility. Pt has 7 stairs to enter his house and reports his wife cannot provide much assist. Recommend TCU to increase strength and functional mobility   PT Brief overview of current status Bed mob Min A, transfers Min A, amb CGA   Total Session Time   Timed Code Treatment Minutes 8   Total Session Time (sum of timed and untimed services) 18     "

## 2023-01-14 ENCOUNTER — APPOINTMENT (OUTPATIENT)
Dept: OCCUPATIONAL THERAPY | Facility: CLINIC | Age: 73
DRG: 871 | End: 2023-01-14
Attending: HOSPITALIST
Payer: COMMERCIAL

## 2023-01-14 LAB
ANION GAP SERPL CALCULATED.3IONS-SCNC: 9 MMOL/L (ref 3–14)
BACTERIA BLD CULT: ABNORMAL
BACTERIA BLD CULT: ABNORMAL
BACTERIA UR CULT: ABNORMAL
BACTERIA UR CULT: ABNORMAL
BUN SERPL-MCNC: 60 MG/DL (ref 7–30)
CALCIUM SERPL-MCNC: 8 MG/DL (ref 8.5–10.1)
CHLORIDE BLD-SCNC: 102 MMOL/L (ref 94–109)
CO2 SERPL-SCNC: 19 MMOL/L (ref 20–32)
CREAT SERPL-MCNC: 1.74 MG/DL (ref 0.66–1.25)
ERYTHROCYTE [DISTWIDTH] IN BLOOD BY AUTOMATED COUNT: 14.6 % (ref 10–15)
GFR SERPL CREATININE-BSD FRML MDRD: 41 ML/MIN/1.73M2
GLUCOSE BLD-MCNC: 181 MG/DL (ref 70–99)
GLUCOSE BLDC GLUCOMTR-MCNC: 174 MG/DL (ref 70–99)
GLUCOSE BLDC GLUCOMTR-MCNC: 206 MG/DL (ref 70–99)
GLUCOSE BLDC GLUCOMTR-MCNC: 256 MG/DL (ref 70–99)
GLUCOSE BLDC GLUCOMTR-MCNC: 359 MG/DL (ref 70–99)
GLUCOSE BLDC GLUCOMTR-MCNC: 380 MG/DL (ref 70–99)
GLUCOSE BLDC GLUCOMTR-MCNC: 414 MG/DL (ref 70–99)
HCT VFR BLD AUTO: 26.8 % (ref 40–53)
HGB BLD-MCNC: 9.1 G/DL (ref 13.3–17.7)
MCH RBC QN AUTO: 27.3 PG (ref 26.5–33)
MCHC RBC AUTO-ENTMCNC: 34 G/DL (ref 31.5–36.5)
MCV RBC AUTO: 81 FL (ref 78–100)
PLATELET # BLD AUTO: 436 10E3/UL (ref 150–450)
POTASSIUM BLD-SCNC: 3.8 MMOL/L (ref 3.4–5.3)
RBC # BLD AUTO: 3.33 10E6/UL (ref 4.4–5.9)
SODIUM SERPL-SCNC: 130 MMOL/L (ref 133–144)
WBC # BLD AUTO: 23.7 10E3/UL (ref 4–11)

## 2023-01-14 PROCEDURE — 36415 COLL VENOUS BLD VENIPUNCTURE: CPT | Performed by: HOSPITALIST

## 2023-01-14 PROCEDURE — 80048 BASIC METABOLIC PNL TOTAL CA: CPT | Performed by: HOSPITALIST

## 2023-01-14 PROCEDURE — 85018 HEMOGLOBIN: CPT | Performed by: HOSPITALIST

## 2023-01-14 PROCEDURE — 258N000003 HC RX IP 258 OP 636: Performed by: HOSPITALIST

## 2023-01-14 PROCEDURE — 120N000001 HC R&B MED SURG/OB

## 2023-01-14 PROCEDURE — 97165 OT EVAL LOW COMPLEX 30 MIN: CPT | Mod: GO

## 2023-01-14 PROCEDURE — 250N000013 HC RX MED GY IP 250 OP 250 PS 637: Performed by: HOSPITALIST

## 2023-01-14 PROCEDURE — 250N000011 HC RX IP 250 OP 636: Performed by: HOSPITALIST

## 2023-01-14 PROCEDURE — 97535 SELF CARE MNGMENT TRAINING: CPT | Mod: GO

## 2023-01-14 PROCEDURE — 99233 SBSQ HOSP IP/OBS HIGH 50: CPT | Performed by: INTERNAL MEDICINE

## 2023-01-14 RX ADMIN — CEFTRIAXONE SODIUM 2 G: 2 INJECTION, POWDER, FOR SOLUTION INTRAMUSCULAR; INTRAVENOUS at 20:11

## 2023-01-14 RX ADMIN — INSULIN ASPART 5 UNITS: 100 INJECTION, SOLUTION INTRAVENOUS; SUBCUTANEOUS at 14:35

## 2023-01-14 RX ADMIN — FERROUS SULFATE TAB 325 MG (65 MG ELEMENTAL FE) 325 MG: 325 (65 FE) TAB at 08:25

## 2023-01-14 RX ADMIN — SODIUM CHLORIDE, POTASSIUM CHLORIDE, SODIUM LACTATE AND CALCIUM CHLORIDE: 600; 310; 30; 20 INJECTION, SOLUTION INTRAVENOUS at 14:28

## 2023-01-14 RX ADMIN — INSULIN ASPART 1 UNITS: 100 INJECTION, SOLUTION INTRAVENOUS; SUBCUTANEOUS at 08:30

## 2023-01-14 RX ADMIN — TAMSULOSIN HYDROCHLORIDE 0.4 MG: 0.4 CAPSULE ORAL at 08:25

## 2023-01-14 RX ADMIN — INSULIN ASPART 6 UNITS: 100 INJECTION, SOLUTION INTRAVENOUS; SUBCUTANEOUS at 16:55

## 2023-01-14 ASSESSMENT — ACTIVITIES OF DAILY LIVING (ADL)
ADLS_ACUITY_SCORE: 43
ADLS_ACUITY_SCORE: 40
ADLS_ACUITY_SCORE: 43
ADLS_ACUITY_SCORE: 43
ADLS_ACUITY_SCORE: 40
ADLS_ACUITY_SCORE: 43
ADLS_ACUITY_SCORE: 47
PREVIOUS_RESPONSIBILITIES: MEAL PREP;HOUSEKEEPING;LAUNDRY;SHOPPING;MEDICATION MANAGEMENT;FINANCES;DRIVING
ADLS_ACUITY_SCORE: 43
ADLS_ACUITY_SCORE: 47
ADLS_ACUITY_SCORE: 47
IADL_COMMENTS: LAUNDRY IN BASEMENT
ADLS_ACUITY_SCORE: 43
ADLS_ACUITY_SCORE: 47

## 2023-01-14 NOTE — PLAN OF CARE
Alert and oriented x's 4.  Up with one assist and walker to the bathroom.  Patient denies pain.  Poor appetite, food and fluids encouraged.  Sat in chair today, tolerated well.  Henderson catheter to straight drainage, output adequate.  Will continue to monitor.

## 2023-01-14 NOTE — PROGRESS NOTES
MD Notification    Notified Person: MD    Notified Person Name: Federico     Notification Date/Time: 1300 1/13/2022    Notification Interaction: mirella    Notification: . Pt reports taking lantus 20 units daily.    Orders Received: Lantus given + 1x 10 units insulin    Comments:

## 2023-01-14 NOTE — PLAN OF CARE
Orientation A&Ox4.    Vitals/Tele VSS RA    IV Access/drains: PIV LR 60/hr    Diet MCHO    Mobility A1 GB&W    GI/ Henderson, incontinent bowel - small/smear.    Wound/Skin PI to coccyx - mepilex in place. Stitches above left eye ELIF.     Consults n/a    Discharge Plan Home w/care vs TCU      See Flow sheets for assessment

## 2023-01-14 NOTE — PROGRESS NOTES
[facility]    Hospitalist Progress Note    Assessment & Plan   South Tovar is a 72 year old male with PMHx of chronic urinary retention, chronic iron deficiency anemia and DM II who was admitted on 1/11/2023 with sepsis dt UTI as well as obstructive STEVE and hyponatremia    Sepsis dt klebsiella pneumoniae UTI with associated klebsiella bacteremia  Toxic metabolic encephalopathy: Improved  * Had been seen in ED in 9/2022 for sx of UTI and urinary retention. At that time, was found to have bilateral hydroureter and hydronephrosis with thickened bladder. Was treated with course of abx (cephalosporins). Follow up with [facility]. Was started on Flomax. Underwent an additional round of abx. Was then hospitalized at [facility] from 10/22/22-10/24/22 with complicated UTI (CAUTI) dt candida in setting of uncontrolled DM. Seen by ID that stay and treated with 2wk course of fluconazole. After discharge, he was seen by his Urology team in 11/2022 and his catheter was removed. He also has chronic anemia, and an outpatient EGD and colonoscopy were done in 11/2022 by WENDY NDIAYE, and reportedly a colonic mass was seen, as discussed below; he had a follow up CT of chest, abdomen and pelvis at that time as an outpatient (on 11/25/22) which reportedly showed improved bladder wall thickening and distension with improved hydronephrosis bilaterally, as well as prostatomegaly.  * Presented this stay for evaluation of 1-2wk hx of progressive severe fatigue and recent fall.   * In ED, was afebrile (Tmax 99), tachycardic but BPs stable. Was notably disoriented. +urinary retention with 1000ml urine out when suh was placed in the ED. Labs notable for WBC 23 and platelet 596; Na 122, K 5.5, Cr 3.8, bicarb 17. Lactate nl. UA notable for pyuria. Head CT neg.   * Clinical picture on admission was for sepsis dt recurrent UTI with associated STEVE, electrolyte disturbance and encephalopathy. Was given IVFs and started on  ceftriaxone.  * 1/2 blood cultures drawn on admission subsequently grew klebsiella pneumoniae (resistent to ampicillin only). UC grew >100k klebsiella pneumonia and a second strain of 50-100k klebsiella.  * WBC remains elevated but fevers resolved and mentation improved  -- cont ceftriaxone 2g daily for now -- anticipate 14d course of treatment, will likely be able to switch to po at discharge  -- trend labs, if leukocytosis persists will ask ID to weigh in on abx plan    STEVE, dt recurrent urinary retention s/p suh catheter placement  BPH  Acute hypochloremic hyponatremia: Improved  Hyperkalemia: Resolved  * Presentation as above. Cr 3.86 on admission with associated metabolic acidosis and hyponatremia (Na 122), K 5.5. Baseline Cr is around 1. Suspected secondary to obstruction and hypovolemia. Noted to have urinary retention requiring suh placement in ED. FENA 4.7.   * Renal US this stay showed bilateral hydronephrosis and hydroureter bilaterally, partially distended urinary bladder with suh in place and dependent debris, vague echogenic nodule in left kidney (could consider enhanced MRI).   * Renal function improved with catheter and IVFs. Na also improved. K normalized.   * Seen by [facility] following this stay -- recommended follow up with primary urologist in after discharge, should keep suh in place for 2 wks  -- Cr 1.7 today (1/14)  -- encourage po intake, cont LR @60ml/h  -- cont Flomax  -- follow up with primary urologist after discharge, consider repeat imaging to demonstrate resolution of hydronephrosis    Chronic iron deficiency anemia  * As above, underwent recent EGD and colonoscopy in 11/2022. Was found to have polyps in the colon and also in the small intestine, and has seen a surgeon with recommendation for possible resection of part of his small intestine. He is markedly cachectic, with severe recent weight loss, and could have intestinal cancer. No further details of the endoscopy results  or the recommended care plan at present.   * Hgb had been around 8 in 10/2022. Hgb 12 on admission this stay. AFter IVFs down to 9-10.   -- hgb stable at 9-10  -- cont oral iron  -- follow up with PCP and surgery as advised    Left temporal laceration  * Secondary to recent fall. Repaired with sutures in ED, expect removal 1/16.    DM II  * A1c 6.6. Manages with Lantus 20U daily, Metformin and Linagliptin.  * Managed with sliding scale insulin initially.   -- cont Lantus but increase from 18U daily to 20U daily on 1/15  -- cont med dose sliding scale insulin     Recent Labs   Lab 01/14/23  1131 01/14/23  0826 01/14/23  0734 01/14/23  0206 01/13/23  2033 01/13/23  1626   * 174* 181* 206* 237* 224*       Severe protein calorie malnutrition  * Noted per nutritionist. Cont supplements between meals.     FEN: no IVFs, lytes stable, mod CHO diet  DVT Prophylaxis: PCDs  Code Status: Full Code    Disposition: Anticipate patient will be medically stable to discharge in 1-2d, pending stable renal function and Na. 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 SW consulted    Courtney Conn, DO    Medical Decision Making       -------------------------- BILLING ON TIME ------------------------------------------------------------------------------------------------------------  52 MINUTES SPENT BY ME on the date of service doing chart review, history, exam, documentation & further activities per the note.         Interval History   Seen this afternoon. Feeling okay. Per RN hasn't taken much po. Ordered PB&J sandwich for lunch but only got 2 slices of bread so going to try drinking an Ensure. Slowly feeling better. Denies cp/sob/cough abd pain/n/v at present. In agreement with need for TCU stay.    -Data reviewed today: I reviewed all new labs and imaging results over the last 24 hours. I personally reviewed no images or EKG's today.    Physical Exam   Temp: 97.6  F (36.4  C)  Temp src: Oral BP: 109/57 Pulse: 90   Resp: 18 SpO2: 98 % O2 Device: None (Room air)    Vitals:    01/12/23 2258 01/13/23 0622 01/14/23 0700   Weight: 56.2 kg (124 lb) 54.3 kg (119 lb 11.4 oz) 54.6 kg (120 lb 5.9 oz)     Vital Signs with Ranges  Temp:  [97.6  F (36.4  C)-98.4  F (36.9  C)] 97.6  F (36.4  C)  Pulse:  [87-98] 90  Resp:  [18] 18  BP: (109-117)/(55-63) 109/57  SpO2:  [98 %-100 %] 98 %  I/O last 3 completed shifts:  In: -   Out: 950 [Urine:950]    Constitutional: Frail appearing male, resting comfortably, alert and conversing appropriately, NAD  Respiratory: CTAB, no wheeze/rales/rhonchi, no increased work of breathing  Cardiovascular: HRRR, no MGR, no LE edema  GI: S, NT, ND, +BS  Skin/Integumen: warm/dry, laceration on forehead healing well  Other:  suh draining clear yellow urine    Medications     lactated ringers 60 mL/hr at 01/13/23 1900       cefTRIAXone  2 g Intravenous Q24H     ferrous sulfate  325 mg Oral Daily     insulin aspart  1-7 Units Subcutaneous TID AC     insulin aspart  1-5 Units Subcutaneous At Bedtime     insulin glargine  18 Units Subcutaneous QAM AC     sodium chloride (PF)  3 mL Intracatheter Q8H     tamsulosin  0.4 mg Oral Daily       Data   Recent Labs   Lab 01/14/23  1131 01/14/23  0826 01/14/23  0734 01/13/23  2033 01/13/23  1651 01/13/23  1255 01/13/23  0713 01/12/23  0522 01/12/23  0520 01/12/23  0037 01/11/23  2100   WBC  --   --  23.7*  --   --   --  25.2*  --  22.1*  --  23.6*   HGB  --   --  9.1*  --   --   --  10.1*  --  10.4*  --  12.1*   MCV  --   --  81  --   --   --  81  --  82  --  83   PLT  --   --  436  --   --   --  482*  --  517*  --  596*   NA  --   --  130*  --  131*  --  129*   < > 131*  --  122*   POTASSIUM  --   --  3.8  --   --   --  3.5  --  4.0  --  5.5*   CHLORIDE  --   --  102  --   --   --  99  --  103  --  90*   CO2  --   --  19*  --   --   --  18*  --  18*  --  17*   BUN  --   --  60*  --   --   --  81*  --  116*  --  133*   CR  --   --  1.74*   --   --   --  2.14*  --  3.06*  --  3.86*   ANIONGAP  --   --  9  --   --   --  12  --  10  --  15*   MARNI  --   --  8.0*  --   --   --  8.0*  --  7.9*  --  9.3   * 174* 181*   < >  --    < > 252*   < > 68*   < > 202*   ALBUMIN  --   --   --   --   --   --   --   --   --   --  2.3*   PROTTOTAL  --   --   --   --   --   --   --   --   --   --  8.8   BILITOTAL  --   --   --   --   --   --   --   --   --   --  1.1   ALKPHOS  --   --   --   --   --   --   --   --   --   --  187*   ALT  --   --   --   --   --   --   --   --   --   --  74*   AST  --   --   --   --   --   --   --   --   --   --  61*   LIPASE  --   --   --   --   --   --   --   --   --   --  64*    < > = values in this interval not displayed.       No results found for this or any previous visit (from the past 24 hour(s)).

## 2023-01-14 NOTE — PLAN OF CARE
[id number]    A&Ox4. VSS on RA. Denies pain, ex slightly sore at abrasion site on head. CDI. IVF LR 60 ml/hr. Henderson in place w/ AUOP. BM x2.  Ax1/G/W to br, needs a lot of encouragement to get OOB. OOB to chair x1. Wound to coccyx - mepilex changed. Skin otherwise intact. , 424 (MD notified & extra insulin coverage given as ordered), 224. Discharge pending likely TCU per PT & MD.

## 2023-01-14 NOTE — PROGRESS NOTES
"   01/14/23 0800   Appointment Info   Signing Clinician's Name / Credentials (OT) Donna Rockwell, OTR/L   Living Environment   People in Home spouse   Current Living Arrangements house   Home Accessibility stairs to enter home   Number of Stairs, Main Entrance 7   Stair Railings, Main Entrance railings safe and in good condition   Transportation Anticipated family or friend will provide   Living Environment Comments Pt lives with his wife, reports she cannot provide much assist, but other family members may be able to. BR SET UP: Tub/shower combo, extended tub bench (started using recently), grab bars; standard toilet.   Self-Care   Usual Activity Tolerance good   Current Activity Tolerance poor   Equipment Currently Used at Home walker, standard;cane, straight   Fall history within last six months yes   Number of times patient has fallen within last six months 5   Activity/Exercise/Self-Care Comment Ind all ADLs baseline.   Instrumental Activities of Daily Living (IADL)   Previous Responsibilities meal prep;housekeeping;laundry;shopping;medication management;finances;driving   IADL Comments Laundry in basement   General Information   Onset of Illness/Injury or Date of Surgery 01/11/23   Referring Physician Ciarra Caballero   Patient/Family Therapy Goal Statement (OT) To get stronger   Additional Occupational Profile Info/Pertinent History of Current Problem Per MD note: \"[place] is a markedly pleasant 72 year old gentleman with past medical history that is most notable for chronic urinary retention, chronic iron deficiency anemia, and Type 2 Diabetes mellitus, who presents with progressive weakness and fall and is found to have suspected sepsis and acute septic encephalopathy due to UTI, as well as severe suspected obstructive STEVE and acute hyponatremia.\"   Existing Precautions/Restrictions fall   Cognitive Status Examination   Orientation Status orientation to person, place and time   Visual Perception "   Visual Impairment/Limitations WFL;corrective lenses for distance   Sensory   Sensory Quick Adds sensation intact   Sensory Comments BUEs intact per pt report   Pain Assessment   Patient Currently in Pain   (2/10 at rest)   Range of Motion Comprehensive   Comment, General Range of Motion BUEs WFL   Strength Comprehensive (MMT)   Comment, General Manual Muscle Testing (MMT) Assessment BUEs WFL, global weakness   Coordination   Upper Extremity Coordination No deficits were identified   Bed Mobility   Bed Mobility supine-sit   Comment (Bed Mobility) SBA   Transfers   Transfers toilet transfer;sit-stand transfer   Sit-Stand Transfer   Sit/Stand Transfer Comments SBA   Toilet Transfer   Toilet Transfer Comments SBA   Activities of Daily Living   BADL Assessment/Intervention lower body dressing   Lower Body Dressing Assessment/Training   Comment, (Lower Body Dressing) SBA   Clinical Impression   Criteria for Skilled Therapeutic Interventions Met (OT) Yes, treatment indicated   OT Diagnosis Decreased ind with I/ADLs   OT Problem List-Impairments impacting ADL problems related to;activity tolerance impaired;balance;mobility   Assessment of Occupational Performance 1-3 Performance Deficits   Identified Performance Deficits toileting, bathing, LB dressing, taxing I/ADLs   Planned Therapy Interventions (OT) ADL retraining;IADL retraining;transfer training   Clinical Decision Making Complexity (OT) low complexity   Risk & Benefits of therapy have been explained evaluation/treatment results reviewed;care plan/treatment goals reviewed;current/potential barriers reviewed;risks/benefits reviewed;participants voiced agreement with care plan;participants included;patient   OT Total Evaluation Time   OT Eval, Low Complexity Minutes (63206) 9   OT Goals   Therapy Frequency (OT) 5 times/wk   OT Predicted Duration/Target Date for Goal Attainment 01/25/23   OT Goals Lower Body Dressing;Upper Body Dressing;Hygiene/Grooming;Toilet  Transfer/Toileting;Cognition;OT Goal 1   OT: Hygiene/Grooming modified independent;while standing  (FWW)   OT: Upper Body Dressing Modified independent;including set-up/clothing retrieval  (FWW)   OT: Lower Body Dressing Modified independent;including set-up/clothing retrieval  (FWW)   OT: Toilet Transfer/Toileting Modified independent;toilet transfer;cleaning and garment management   OT: Cognitive Patient/caregiver will verbalize understanding of cognitive assessment results/recommendations as needed for safe discharge planning  ((As needed))   OT: Goal 1 Pt will demonstrate simulated tub/shower transfer with tub bench with Mod I, using FWW, for safety/independence with ADLs.   Self-Care/Home Management   Self-Care/Home Mgmt/ADL, Compensatory, Meal Prep Minutes (97316) 23   Symptoms Noted During/After Treatment (Meal Preparation/Planning Training) fatigue   Treatment Detail/Skilled Intervention Pt supine in bed, agreeable to OT session. Pt demo supine>sitting EOB with SBA, HOB raised, assist with lines, inncreased time for task. Pt demo sit>stand from EOB With Min A, FWW, VCs for safety. Pt demo room lvel functional mobility to/from BR with CGA, FWW, VCs for safety, pt unsteady, assist with lines/IV pole. Pt demo toilet transfer with Mod A, FWW, increased time for task, heavy use of grab bar. Pt demo stand>sit in chair with Min A for controlled sit, VCs for task sequence. Pt demo LB dressing to doff/don B socks using figure 4 technique. Pt up in chair with all needs met, alarm set, RN updated.   OT Discharge Planning   OT Plan LB dressing; tub/shower transfer with bench; monitor cog   OT Discharge Recommendation (DC Rec) Transitional Care Facility   OT Rationale for DC Rec Pt limited d/t decreased strength, balance, mobility, and ADLs, impacting overall independence. Pt currently Ax 1 for functional mobilty, toilet transfers, indicating pt is a fall risk. Pt would benefit from skilled TCU for strengthening and self  cares prior to return home.   OT Brief overview of current status CGA-Min A for STS w/FWW; Mod A toilet transfer with FWW; SBA LB dressing doff/don socks   Total Session Time   Timed Code Treatment Minutes 23   Total Session Time (sum of timed and untimed services) 32

## 2023-01-15 LAB
ANION GAP SERPL CALCULATED.3IONS-SCNC: 7 MMOL/L (ref 3–14)
BUN SERPL-MCNC: 52 MG/DL (ref 7–30)
CALCIUM SERPL-MCNC: 8.4 MG/DL (ref 8.5–10.1)
CHLORIDE BLD-SCNC: 102 MMOL/L (ref 94–109)
CO2 SERPL-SCNC: 22 MMOL/L (ref 20–32)
CREAT SERPL-MCNC: 1.41 MG/DL (ref 0.66–1.25)
ERYTHROCYTE [DISTWIDTH] IN BLOOD BY AUTOMATED COUNT: 14.7 % (ref 10–15)
GFR SERPL CREATININE-BSD FRML MDRD: 53 ML/MIN/1.73M2
GLUCOSE BLD-MCNC: 205 MG/DL (ref 70–99)
GLUCOSE BLDC GLUCOMTR-MCNC: 201 MG/DL (ref 70–99)
GLUCOSE BLDC GLUCOMTR-MCNC: 258 MG/DL (ref 70–99)
GLUCOSE BLDC GLUCOMTR-MCNC: 279 MG/DL (ref 70–99)
GLUCOSE BLDC GLUCOMTR-MCNC: 296 MG/DL (ref 70–99)
HCT VFR BLD AUTO: 29 % (ref 40–53)
HGB BLD-MCNC: 9.6 G/DL (ref 13.3–17.7)
MCH RBC QN AUTO: 27.7 PG (ref 26.5–33)
MCHC RBC AUTO-ENTMCNC: 33.1 G/DL (ref 31.5–36.5)
MCV RBC AUTO: 84 FL (ref 78–100)
PLATELET # BLD AUTO: 432 10E3/UL (ref 150–450)
POTASSIUM BLD-SCNC: 3.9 MMOL/L (ref 3.4–5.3)
RBC # BLD AUTO: 3.47 10E6/UL (ref 4.4–5.9)
SODIUM SERPL-SCNC: 131 MMOL/L (ref 133–144)
WBC # BLD AUTO: 17.8 10E3/UL (ref 4–11)

## 2023-01-15 PROCEDURE — 250N000011 HC RX IP 250 OP 636: Performed by: HOSPITALIST

## 2023-01-15 PROCEDURE — 80048 BASIC METABOLIC PNL TOTAL CA: CPT | Performed by: INTERNAL MEDICINE

## 2023-01-15 PROCEDURE — 258N000003 HC RX IP 258 OP 636: Performed by: HOSPITALIST

## 2023-01-15 PROCEDURE — 250N000013 HC RX MED GY IP 250 OP 250 PS 637: Performed by: HOSPITALIST

## 2023-01-15 PROCEDURE — 99232 SBSQ HOSP IP/OBS MODERATE 35: CPT | Performed by: INTERNAL MEDICINE

## 2023-01-15 PROCEDURE — 120N000001 HC R&B MED SURG/OB

## 2023-01-15 PROCEDURE — 36415 COLL VENOUS BLD VENIPUNCTURE: CPT | Performed by: INTERNAL MEDICINE

## 2023-01-15 PROCEDURE — 85027 COMPLETE CBC AUTOMATED: CPT | Performed by: INTERNAL MEDICINE

## 2023-01-15 RX ADMIN — SODIUM CHLORIDE, POTASSIUM CHLORIDE, SODIUM LACTATE AND CALCIUM CHLORIDE: 600; 310; 30; 20 INJECTION, SOLUTION INTRAVENOUS at 06:20

## 2023-01-15 RX ADMIN — SENNOSIDES AND DOCUSATE SODIUM 2 TABLET: 50; 8.6 TABLET ORAL at 09:20

## 2023-01-15 RX ADMIN — INSULIN ASPART 2 UNITS: 100 INJECTION, SOLUTION INTRAVENOUS; SUBCUTANEOUS at 09:04

## 2023-01-15 RX ADMIN — TAMSULOSIN HYDROCHLORIDE 0.4 MG: 0.4 CAPSULE ORAL at 09:02

## 2023-01-15 RX ADMIN — INSULIN ASPART 4 UNITS: 100 INJECTION, SOLUTION INTRAVENOUS; SUBCUTANEOUS at 17:16

## 2023-01-15 RX ADMIN — FERROUS SULFATE TAB 325 MG (65 MG ELEMENTAL FE) 325 MG: 325 (65 FE) TAB at 09:02

## 2023-01-15 RX ADMIN — CEFTRIAXONE SODIUM 2 G: 2 INJECTION, POWDER, FOR SOLUTION INTRAMUSCULAR; INTRAVENOUS at 20:19

## 2023-01-15 RX ADMIN — INSULIN ASPART 3 UNITS: 100 INJECTION, SOLUTION INTRAVENOUS; SUBCUTANEOUS at 13:20

## 2023-01-15 ASSESSMENT — ACTIVITIES OF DAILY LIVING (ADL)
ADLS_ACUITY_SCORE: 43

## 2023-01-15 NOTE — PLAN OF CARE
Goal Outcome Evaluation:       A&Ox4. VSS on room air. Up Ao1 with GBW. Denies pain. Henderson intact and patent. Pt ate 75% of dinner today, appetite seemed to have improved this evening. Discharge pending.

## 2023-01-15 NOTE — PROGRESS NOTES
Notified provider about indwelling suh catheter discussed removal or continued need.  Plan to keep in at discharge.    Did provider choose to remove indwelling suh catheter? no    Provider's suh indication for keeping indwelling suh:  Keep in for 2 weeks per urology.    Is there an order for indwelling suh catheter? yes    *If there is a plan to keep suh catheter in place at discharge daily notification with provider is not necessary.

## 2023-01-15 NOTE — PLAN OF CARE
Alert and oriented x's 4.  Up with one assist and walker to the bathroom.  Patient denies pain.  Appetite fair today, food and fluids encouraged.  Sat in chair, tolerated well.  Henderson catheter to straight drainage, output adequate.  Will continue to monitor.

## 2023-01-15 NOTE — PROGRESS NOTES
[facility]    Hospitalist Progress Note    Assessment & Plan   [place] is a 72 year old male with PMHx of chronic urinary retention, chronic iron deficiency anemia and DM II who was admitted on 1/11/2023 with sepsis dt UTI as well as obstructive TSEVE and hyponatremia    Sepsis dt klebsiella pneumoniae UTI with associated klebsiella bacteremia  Toxic metabolic encephalopathy: Improved  * Had been seen in ED in 9/2022 for sx of UTI and urinary retention. At that time, was found to have bilateral hydroureter and hydronephrosis with thickened bladder. Was treated with course of abx (cephalosporins). Follow up with [facility]. Was started on Flomax. Underwent an additional round of abx. Was then hospitalized at [facility] from 10/22/22-10/24/22 with complicated UTI (CAUTI) dt candida in setting of uncontrolled DM. Seen by ID that stay and treated with 2wk course of fluconazole. After discharge, he was seen by his Urology team in 11/2022 and his catheter was removed. He also has chronic anemia, and an outpatient EGD and colonoscopy were done in 11/2022 by WENDY NDIAYE, and reportedly a colonic mass was seen, as discussed below; he had a follow up CT of chest, abdomen and pelvis at that time as an outpatient (on 11/25/22) which reportedly showed improved bladder wall thickening and distension with improved hydronephrosis bilaterally, as well as prostatomegaly.  * Presented this stay for evaluation of 1-2wk hx of progressive severe fatigue and recent fall.   * In ED, was afebrile (Tmax 99), tachycardic but BPs stable. Was notably disoriented. +urinary retention with 1000ml urine out when suh was placed in the ED. Labs notable for WBC 23 and platelet 596; Na 122, K 5.5, Cr 3.8, bicarb 17. Lactate nl. UA notable for pyuria. Head CT neg.   * Clinical picture on admission was for sepsis dt recurrent UTI with associated STEVE, electrolyte disturbance and encephalopathy. Was given IVFs and started on  ceftriaxone.  * 1/2 blood cultures drawn on admission subsequently grew klebsiella pneumoniae (resistent to ampicillin only). UC grew >100k klebsiella pneumonia and a second strain of 50-100k klebsiella.  -- WBC improving, fevers resolved, mentation improved  -- cont ceftriaxone 2g daily for now -- anticipate 14d course of treatment, will likely be able to switch to po at discharge  -- trend labs, if leukocytosis persists will ask ID to weigh in on abx plan    STEVE, dt recurrent urinary retention s/p suh catheter placement  BPH  Acute hypochloremic hyponatremia: Improved  Hyperkalemia: Resolved  * Baseline Cr is around 1.   * Presentation as above. Cr 3.86 on admission with associated metabolic acidosis and hyponatremia (Na 122), K 5.5. Suspected secondary to obstruction and hypovolemia. Noted to have urinary retention requiring suh placement in ED. FENA 4.7.   * Renal US this stay showed bilateral hydronephrosis and hydroureter bilaterally, partially distended urinary bladder with suh in place and dependent debris, vague echogenic nodule in left kidney (could consider enhanced MRI).   * Renal function improved with catheter and IVFs. Na also improved. K normalized.   * Seen by [facility] following this stay -- recommended follow up with primary urologist in after discharge, should keep suh in place for 2 wks  -- Cr conts to improve -- down to 1.4 today (1/15),  -- encourage po intake, cont LR @60ml/h  -- cont Flomax  -- follow up with primary urologist after discharge, consider repeat imaging to demonstrate resolution of hydronephrosis    Chronic iron deficiency anemia  * As above, underwent recent EGD and colonoscopy in 11/2022. Was found to have polyps in the colon and also in the small intestine, and has seen a surgeon with recommendation for possible resection of part of his small intestine. He is markedly cachectic, with severe recent weight loss, and could have intestinal cancer. No further details of  the endoscopy results or the recommended care plan at present.   * Hgb had been around 8 in 10/2022. Hgb 12 on admission this stay. AFter IVFs down to 9-10.   -- hgb stable at 9-10  -- cont oral iron  -- follow up with PCP and surgery as advised    Left temporal laceration  * Secondary to recent fall. Repaired with sutures in ED, expect removal 1/16.    DM II  * A1c 6.6. Manages with Lantus 20U daily, Metformin and Linagliptin.  * Managed with sliding scale insulin initially.   -- BG remain elevated, increased Lantus from 20U daily to 25U daily on 1/15  -- cont med dose sliding scale insulin     Recent Labs   Lab 01/15/23  0700 01/15/23  0632 01/14/23  2058 01/14/23  1629 01/14/23  1428 01/14/23  1131   * 201* 256* 414* 380* 359*       Severe protein calorie malnutrition  * Noted per nutritionist. Cont supplements between meals.     FEN: IVFs as above, lytes stable, mod CHO diet  DVT Prophylaxis: PCDs  Code Status: Full Code    Disposition: Labs improving. Anticipate discharge in 1-2d. 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 SW following.    Courtney Conn, DO    Medical Decision Making       -------------------------- BILLING ON TIME ------------------------------------------------------------------------------------------------------------  35 MINUTES SPENT BY ME on the date of service doing chart review, history, exam, documentation & further activities per the note.         Interval History    Uneventful night. Seen this morning. Feeling okay. No specific complaints. Trying to take more po. Denies cp/sob/cough, abd pain/n/v.     -Data reviewed today: I reviewed all new labs and imaging results over the last 24 hours. I personally reviewed no images or EKG's today.    Physical Exam   Temp: (P) 99.6  F (37.6  C) Temp src: (P) Oral BP: (!) (P) 145/70 Pulse: (P) 95   Resp: (P) 17 SpO2: (P) 98 % O2 Device: (P) None (Room air)    Vitals:    01/12/23 2258  01/13/23 0622 01/14/23 0700   Weight: 56.2 kg (124 lb) 54.3 kg (119 lb 11.4 oz) 54.6 kg (120 lb 5.9 oz)     Vital Signs with Ranges  Temp:  [97.8  F (36.6  C)-99.6  F (37.6  C)] (P) 99.6  F (37.6  C)  Pulse:  [95-97] (P) 95  Resp:  [16-18] (P) 17  BP: (112)/(59) (P) 145/70  SpO2:  [97 %-98 %] (P) 98 %  I/O last 3 completed shifts:  In: 1440 [P.O.:960; I.V.:480]  Out: 4450 [Urine:4450]    Constitutional: Cachectic appearing male, resting comfortably, alert and conversing appropriately, NAD  Respiratory: CTAB, no wheeze/rales/rhonchi, no increased work of breathing  Cardiovascular: HRRR, no MGR, no LE edema  GI: S, NT, ND, +BS  Skin/Integumen: warm/dry, laceration on forehead healing well  Other:  suh draining clear yellow urine    Medications     lactated ringers 60 mL/hr at 01/15/23 0620       cefTRIAXone  2 g Intravenous Q24H     ferrous sulfate  325 mg Oral Daily     insulin aspart  1-7 Units Subcutaneous TID AC     insulin aspart  1-5 Units Subcutaneous At Bedtime     insulin glargine  20 Units Subcutaneous QAM AC     sodium chloride (PF)  3 mL Intracatheter Q8H     tamsulosin  0.4 mg Oral Daily       Data   Recent Labs   Lab 01/15/23  0700 01/15/23  0632 01/14/23  2058 01/14/23  0826 01/14/23  0734 01/13/23  2033 01/13/23  1651 01/13/23  1255 01/13/23  0713 01/12/23  0037 01/11/23  2100   WBC 17.8*  --   --   --  23.7*  --   --   --  25.2*   < > 23.6*   HGB 9.6*  --   --   --  9.1*  --   --   --  10.1*   < > 12.1*   MCV 84  --   --   --  81  --   --   --  81   < > 83     --   --   --  436  --   --   --  482*   < > 596*   *  --   --   --  130*  --  131*  --  129*   < > 122*   POTASSIUM 3.9  --   --   --  3.8  --   --   --  3.5   < > 5.5*   CHLORIDE 102  --   --   --  102  --   --   --  99   < > 90*   CO2 22  --   --   --  19*  --   --   --  18*   < > 17*   BUN 52*  --   --   --  60*  --   --   --  81*   < > 133*   CR 1.41*  --   --   --  1.74*  --   --   --  2.14*   < > 3.86*   ANIONGAP 7  --   --    --  9  --   --   --  12   < > 15*   MARNI 8.4*  --   --   --  8.0*  --   --   --  8.0*   < > 9.3   * 201* 256*   < > 181*   < >  --    < > 252*   < > 202*   ALBUMIN  --   --   --   --   --   --   --   --   --   --  2.3*   PROTTOTAL  --   --   --   --   --   --   --   --   --   --  8.8   BILITOTAL  --   --   --   --   --   --   --   --   --   --  1.1   ALKPHOS  --   --   --   --   --   --   --   --   --   --  187*   ALT  --   --   --   --   --   --   --   --   --   --  74*   AST  --   --   --   --   --   --   --   --   --   --  61*   LIPASE  --   --   --   --   --   --   --   --   --   --  64*    < > = values in this interval not displayed.       No results found for this or any previous visit (from the past 24 hour(s)).

## 2023-01-15 NOTE — PLAN OF CARE
Goal Outcome Evaluation:    Pt I was admitted with weakness and fall and was found to have suspected sepsis. Pt s alert and oriented x 4, VSS on RA, IVF fluid @ 60 ml/ hr. Chronic suh in place,patent and draining well. Denied pain,nausea and vomiting. Pt is also a BG checks. Discharge pending improvement.

## 2023-01-15 NOTE — PROGRESS NOTES
Notified provider about indwelling suh catheter discussed removal or continued need.  Plan to keep in at discharge    Did provider choose to remove indwelling suh catheter? no    Provider's suh indication for keeping indwelling suh catheter:  Retention    Is there an order for indwelling suh catheter? yes    *If there is a plan to keep suh catheter in place at discharge daily notification with provider is not necessary.

## 2023-01-16 ENCOUNTER — APPOINTMENT (OUTPATIENT)
Dept: OCCUPATIONAL THERAPY | Facility: CLINIC | Age: 73
DRG: 871 | End: 2023-01-16
Payer: COMMERCIAL

## 2023-01-16 LAB
ANION GAP SERPL CALCULATED.3IONS-SCNC: 6 MMOL/L (ref 3–14)
BUN SERPL-MCNC: 42 MG/DL (ref 7–30)
CALCIUM SERPL-MCNC: 8.8 MG/DL (ref 8.5–10.1)
CHLORIDE BLD-SCNC: 102 MMOL/L (ref 94–109)
CO2 SERPL-SCNC: 27 MMOL/L (ref 20–32)
CREAT SERPL-MCNC: 1.21 MG/DL (ref 0.66–1.25)
ERYTHROCYTE [DISTWIDTH] IN BLOOD BY AUTOMATED COUNT: 14.8 % (ref 10–15)
GFR SERPL CREATININE-BSD FRML MDRD: 63 ML/MIN/1.73M2
GLUCOSE BLD-MCNC: 180 MG/DL (ref 70–99)
GLUCOSE BLDC GLUCOMTR-MCNC: 154 MG/DL (ref 70–99)
GLUCOSE BLDC GLUCOMTR-MCNC: 298 MG/DL (ref 70–99)
GLUCOSE BLDC GLUCOMTR-MCNC: 318 MG/DL (ref 70–99)
HCT VFR BLD AUTO: 28.6 % (ref 40–53)
HGB BLD-MCNC: 9.4 G/DL (ref 13.3–17.7)
MCH RBC QN AUTO: 27.3 PG (ref 26.5–33)
MCHC RBC AUTO-ENTMCNC: 32.9 G/DL (ref 31.5–36.5)
MCV RBC AUTO: 83 FL (ref 78–100)
PLATELET # BLD AUTO: 430 10E3/UL (ref 150–450)
POTASSIUM BLD-SCNC: 3.8 MMOL/L (ref 3.4–5.3)
RBC # BLD AUTO: 3.44 10E6/UL (ref 4.4–5.9)
SODIUM SERPL-SCNC: 135 MMOL/L (ref 133–144)
WBC # BLD AUTO: 15.9 10E3/UL (ref 4–11)

## 2023-01-16 PROCEDURE — 99232 SBSQ HOSP IP/OBS MODERATE 35: CPT | Performed by: INTERNAL MEDICINE

## 2023-01-16 PROCEDURE — 97535 SELF CARE MNGMENT TRAINING: CPT | Mod: GO | Performed by: OCCUPATIONAL THERAPIST

## 2023-01-16 PROCEDURE — 250N000012 HC RX MED GY IP 250 OP 636 PS 637: Performed by: INTERNAL MEDICINE

## 2023-01-16 PROCEDURE — 80048 BASIC METABOLIC PNL TOTAL CA: CPT | Performed by: INTERNAL MEDICINE

## 2023-01-16 PROCEDURE — 120N000001 HC R&B MED SURG/OB

## 2023-01-16 PROCEDURE — 250N000013 HC RX MED GY IP 250 OP 250 PS 637: Performed by: HOSPITALIST

## 2023-01-16 PROCEDURE — 85027 COMPLETE CBC AUTOMATED: CPT | Performed by: INTERNAL MEDICINE

## 2023-01-16 PROCEDURE — 250N000011 HC RX IP 250 OP 636: Performed by: HOSPITALIST

## 2023-01-16 PROCEDURE — 36415 COLL VENOUS BLD VENIPUNCTURE: CPT | Performed by: INTERNAL MEDICINE

## 2023-01-16 RX ADMIN — FERROUS SULFATE TAB 325 MG (65 MG ELEMENTAL FE) 325 MG: 325 (65 FE) TAB at 08:19

## 2023-01-16 RX ADMIN — TAMSULOSIN HYDROCHLORIDE 0.4 MG: 0.4 CAPSULE ORAL at 08:19

## 2023-01-16 RX ADMIN — INSULIN ASPART 4 UNITS: 100 INJECTION, SOLUTION INTRAVENOUS; SUBCUTANEOUS at 13:04

## 2023-01-16 RX ADMIN — INSULIN GLARGINE 25 UNITS: 100 INJECTION, SOLUTION SUBCUTANEOUS at 09:06

## 2023-01-16 RX ADMIN — INSULIN ASPART 1 UNITS: 100 INJECTION, SOLUTION INTRAVENOUS; SUBCUTANEOUS at 09:06

## 2023-01-16 RX ADMIN — CEFTRIAXONE SODIUM 2 G: 2 INJECTION, POWDER, FOR SOLUTION INTRAMUSCULAR; INTRAVENOUS at 20:08

## 2023-01-16 RX ADMIN — INSULIN ASPART 4 UNITS: 100 INJECTION, SOLUTION INTRAVENOUS; SUBCUTANEOUS at 16:49

## 2023-01-16 ASSESSMENT — ACTIVITIES OF DAILY LIVING (ADL)
ADLS_ACUITY_SCORE: 43

## 2023-01-16 NOTE — PLAN OF CARE
Goal Outcome Evaluation:         A/O x4. VSS on RA. Up with 1. Denies pain. Mod carb diet. Henderson patent, will discharge with. Discharge pending placement.

## 2023-01-16 NOTE — PROGRESS NOTES
[facility]    Hospitalist Progress Note    Assessment & Plan   [place] Tovar is a 72 year old male with PMHx of chronic urinary retention, chronic iron deficiency anemia and DM II who was admitted on 1/11/2023 with sepsis dt UTI as well as obstructive STEVE and hyponatremia    Sepsis dt klebsiella pneumoniae UTI with associated klebsiella bacteremia  Toxic metabolic encephalopathy: Improved  * Had been seen in ED in 9/2022 for sx of UTI and urinary retention. At that time, was found to have bilateral hydroureter and hydronephrosis with thickened bladder. Was treated with course of abx (cephalosporins). Follow up with [facility]. Was started on Flomax. Underwent an additional round of abx. Was then hospitalized at [facility] from 10/22/22-10/24/22 with complicated UTI (CAUTI) dt candida in setting of uncontrolled DM. Seen by ID that stay and treated with 2wk course of fluconazole. After discharge, he was seen by his Urology team in 11/2022 and his catheter was removed. He also has chronic anemia, and an outpatient EGD and colonoscopy were done in 11/2022 by WENDY NDIAYE, and reportedly a colonic mass was seen, as discussed below; he had a follow up CT of chest, abdomen and pelvis at that time as an outpatient (on 11/25/22) which reportedly showed improved bladder wall thickening and distension with improved hydronephrosis bilaterally, as well as prostatomegaly.  * Presented this stay for evaluation of 1-2wk hx of progressive severe fatigue and recent fall.   * In ED, was afebrile (Tmax 99), tachycardic but BPs stable. Was notably disoriented. +urinary retention with 1000ml urine out when suh was placed in the ED. Labs notable for WBC 23 and platelet 596; Na 122, K 5.5, Cr 3.8, bicarb 17. Lactate nl. UA notable for pyuria. Head CT neg.   * Clinical picture on admission was for sepsis dt recurrent UTI with associated STEVE, electrolyte disturbance and encephalopathy. Was given IVFs and started on  ceftriaxone.  * 1/2 blood cultures drawn on admission subsequently grew klebsiella pneumoniae (resistent to ampicillin only). UC grew >100k klebsiella pneumonia and a second strain of 50-100k klebsiella.  -- WBC improving, fevers resolved, mentation improved  -- cont ceftriaxone 2g daily for now -- anticipate 14d course of treatment, will likely be able to switch to po at discharge  -- trend labs, if leukocytosis persists will ask ID to weigh in on abx plan    STEVE, dt recurrent urinary retention s/p suh catheter placement  BPH  Acute hypochloremic hyponatremia: Improved  Hyperkalemia: Resolved  * Baseline Cr is around 1.   * Presentation as above. Cr 3.86 on admission with associated metabolic acidosis and hyponatremia (Na 122), K 5.5. Suspected secondary to obstruction and hypovolemia. Noted to have urinary retention requiring suh placement in ED. FENA 4.7.   * Renal US this stay showed bilateral hydronephrosis and hydroureter bilaterally, partially distended urinary bladder with suh in place and dependent debris, vague echogenic nodule in left kidney (could consider enhanced MRI).   * Renal function improved with catheter and IVFs. Na also improved. K normalized.   * Seen by [facility] following this stay -- recommended follow up with primary urologist in after discharge, should keep suh in place for 2 wks  -- lytes and Cr back to baseline as of 1/16, IVFs dc'd  -- cont Flomax  -- will discharge with suh catheter in place, follow up with primary urologist after discharge, consider repeat imaging to demonstrate resolution of hydronephrosis    Chronic iron deficiency anemia  * As above, underwent recent EGD and colonoscopy in 11/2022. Was found to have polyps in the colon and also in the small intestine, and has seen a surgeon with recommendation for possible resection of part of his small intestine. He is markedly cachectic, with severe recent weight loss, and could have intestinal cancer. No further  details of the endoscopy results or the recommended care plan at present.   * Hgb had been around 8 in 10/2022. Hgb 12 on admission this stay. AFter IVFs down to 9-10.   -- hgb stable at 9-10  -- cont oral iron  -- follow up with PCP and surgery as advised    Left temporal laceration  * Secondary to recent fall. Repaired with sutures in ED. Sutures removed on 1/16.     DM II  * A1c 6.6. Manages with Lantus 20U daily, Metformin and Linagliptin.  * Managed with sliding scale insulin initially.   -- BG improving, cont Lantus 25U daily and med dose sliding scale insulin     Recent Labs   Lab 01/16/23  0700 01/15/23  2122 01/15/23  1641 01/15/23  1201 01/15/23  0700 01/15/23  0632   * 258* 296* 279* 205* 201*       Severe protein calorie malnutrition  * Noted per nutritionist. Cont supplements between meals.     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 Labs improving. Anticipate discharge in 1-2d once placement found. SW following.    Courtney Conn, DO    Medical Decision Making       -------------------------- BILLING ON TIME ------------------------------------------------------------------------------------------------------------  35 MINUTES SPENT BY ME on the date of service doing chart review, history, exam, documentation & further activities per the note.         Interval History    Seen this morning. Just finished working with OT and feeling fatigued/winded. No cp/sob/cough, abd pain/n/v. Taking some po.     -Data reviewed today: I reviewed all new labs and imaging results over the last 24 hours. I personally reviewed no images or EKG's today.    Physical Exam   Temp: 97.9  F (36.6  C) Temp src: Oral BP: 137/71 Pulse: 100   Resp: 16 SpO2: 97 % O2 Device: None (Room air)    Vitals:    01/12/23 2258 01/13/23 0622 01/14/23  0700   Weight: 56.2 kg (124 lb) 54.3 kg (119 lb 11.4 oz) 54.6 kg (120 lb 5.9 oz)     Vital Signs with Ranges  Temp:  [97.7  F (36.5  C)-97.9  F (36.6  C)] 97.9  F (36.6  C)  Pulse:  [] 100  Resp:  [16-18] 16  BP: (117-137)/(54-71) 137/71  SpO2:  [97 %-98 %] 97 %  I/O last 3 completed shifts:  In: 660 [P.O.:660]  Out: 3175 [Urine:3175]    Constitutional: Cachectic appearing male, resting comfortably, alert and conversing appropriately, NAD  Respiratory: CTAB, no wheeze/rales/rhonchi, no increased work of breathing  Cardiovascular: HRRR, no MGR, no LE edema  GI: S, NT, ND, +BS  Skin/Integumen: warm/dry, laceration on forehead healing well   Other: suh draining clear yellow urine    Medications     lactated ringers 60 mL/hr at 01/15/23 0620       cefTRIAXone  2 g Intravenous Q24H     ferrous sulfate  325 mg Oral Daily     insulin aspart  1-7 Units Subcutaneous TID AC     insulin aspart  1-5 Units Subcutaneous At Bedtime     insulin glargine  25 Units Subcutaneous QAM AC     sodium chloride (PF)  3 mL Intracatheter Q8H     tamsulosin  0.4 mg Oral Daily       Data   Recent Labs   Lab 01/16/23  0700 01/15/23  2122 01/15/23  1641 01/15/23  1201 01/15/23  0700 01/14/23  0826 01/14/23  0734 01/12/23  0037 01/11/23  2100   WBC 15.9*  --   --   --  17.8*  --  23.7*   < > 23.6*   HGB 9.4*  --   --   --  9.6*  --  9.1*   < > 12.1*   MCV 83  --   --   --  84  --  81   < > 83     --   --   --  432  --  436   < > 596*     --   --   --  131*  --  130*   < > 122*   POTASSIUM 3.8  --   --   --  3.9  --  3.8   < > 5.5*   CHLORIDE 102  --   --   --  102  --  102   < > 90*   CO2 27  --   --   --  22  --  19*   < > 17*   BUN 42*  --   --   --  52*  --  60*   < > 133*   CR 1.21  --   --   --  1.41*  --  1.74*   < > 3.86*   ANIONGAP 6  --   --   --  7  --  9   < > 15*   MARNI 8.8  --   --   --  8.4*  --  8.0*   < > 9.3   * 258* 296*   < > 205*   < > 181*   < > 202*   ALBUMIN  --   --   --   --   --   --   --   --   2.3*   PROTTOTAL  --   --   --   --   --   --   --   --  8.8   BILITOTAL  --   --   --   --   --   --   --   --  1.1   ALKPHOS  --   --   --   --   --   --   --   --  187*   ALT  --   --   --   --   --   --   --   --  74*   AST  --   --   --   --   --   --   --   --  61*   LIPASE  --   --   --   --   --   --   --   --  64*    < > = values in this interval not displayed.       No results found for this or any previous visit (from the past 24 hour(s)).

## 2023-01-16 NOTE — PLAN OF CARE
Goal Outcome Evaluation:       A&Ox4. VSS on room air. Up Ao1 with GBW. Denies pain. Henderson intact and patent. Pt ate 50% of dinner today. Blood sugar check, insulin coverage per order. Discharge pending.

## 2023-01-16 NOTE — CONSULTS
Care Management Initial Consult    General Information  Assessment completed with: Patient, VM-chart review, South  Type of CM/SW Visit: Initial Assessment    Primary Care Provider verified and updated as needed: Yes (Mallory Davila [phone] [facility])   Readmission within the last 30 days: no previous admission in last 30 days   Return Category: New Diagnosis  Reason for Consult: discharge planning  Advance Care Planning:            Communication Assessment  Patient's communication style: spoken language (English or Bilingual)             Cognitive  Cognitive/Neuro/Behavioral: WDL  Level of Consciousness: alert                   Living Environment:   People in home: spouse  Jihan  Current living Arrangements: house ([address]   [place] [address])      Able to return to prior arrangements: yes       Family/Social Support:  Care provided by: self  Provides care for: spouse  Marital Status:   Wife  Jihan       Description of Support System:  (wife is cared for by patient)         Current Resources:   Patient receiving home care services:       Community Resources:    Equipment currently used at home: walker, standard, cane, straight  Supplies currently used at home:      Employment/Financial:  Employment Status: retired     Employment/ Comments: (P) retired Planner - [employer]  Financial Concerns:             Lifestyle & Psychosocial Needs:  Social Determinants of Health     Tobacco Use: Medium Risk     Smoking Tobacco Use: Former     Smokeless Tobacco Use: Never     Passive Exposure: Not on file   Alcohol Use: Not on file   Financial Resource Strain: Not on file   Food Insecurity: Not on file   Transportation Needs: Not on file   Physical Activity: Not on file   Stress: Not on file   Social Connections: Not on file   Intimate Partner Violence: Not on file   Depression: Not on file   Housing Stability: Not on file       Functional Status:  Prior to admission patient  needed assistance:              Mental Health Status:          Chemical Dependency Status:                Values/Beliefs:  Spiritual, Cultural Beliefs, [employer] Practices, Values that affect care:                 Additional Information:  Consult for discharge planning. Patient admitted on 1/11/23 for generalized weakness and a fall and a tentative discharge date of 1/17/23.  Writer reviewed chart and TCU recommendations at discharge. Writer met with patient via phone and introduced self and role. Writer confirmed patient's primary doctor is  with [facility] in [place] . Patient in agreement for TCU at discharge and would like referrals sent to [facility] and [place]. Writer discussed private room and cost associated vs shared room and patient would like to see what is available and decide. Referrals sent via [facility].     Patient has some support at home. He has a neighbor who is able to offer information for support but otherwise patient takes care of himself and wife at baseline. Writer does not patient has a community support . Patient has been vaccinated for COVID and has had the booster.      Writer discussed transportation options and possible out of pocket costs of transport with patient. Patient would like TBD at discharge.         CAMILA Godwin

## 2023-01-16 NOTE — PLAN OF CARE
Goal Outcome Evaluation:    Shift: 1/15/23-1/16/23 [id number]    A&O x4, very pleasant. VSS on RA. A1 GBW. Henderson intact/patent, draining well, moderate output. No BM this shift. IVF @ 60 ml/hr. BG checks. Mepilex in place on coccyx. Denies pain or nausea. Discharge pending improvement.

## 2023-01-16 NOTE — PROGRESS NOTES
Notified provider about indwelling suh catheter discussed removal or continued need.    Did provider choose to remove indwelling suh catheter? No    Provider's suh indication for keeping indwelling suh catheter: Recurrent retention    Is there an order for indwelling suh catheter? Yes    *If there is a plan to keep suh catheter in place at discharge daily notification with provider is not necessary.       Pt will be discharging with suh.

## 2023-01-17 ENCOUNTER — APPOINTMENT (OUTPATIENT)
Dept: PHYSICAL THERAPY | Facility: CLINIC | Age: 73
DRG: 871 | End: 2023-01-17
Payer: COMMERCIAL

## 2023-01-17 LAB
ANION GAP SERPL CALCULATED.3IONS-SCNC: 7 MMOL/L (ref 3–14)
BACTERIA BLD CULT: NO GROWTH
BUN SERPL-MCNC: 40 MG/DL (ref 7–30)
CALCIUM SERPL-MCNC: 8.9 MG/DL (ref 8.5–10.1)
CHLORIDE BLD-SCNC: 101 MMOL/L (ref 94–109)
CO2 SERPL-SCNC: 28 MMOL/L (ref 20–32)
CREAT SERPL-MCNC: 1.19 MG/DL (ref 0.66–1.25)
ERYTHROCYTE [DISTWIDTH] IN BLOOD BY AUTOMATED COUNT: 14.6 % (ref 10–15)
GFR SERPL CREATININE-BSD FRML MDRD: 64 ML/MIN/1.73M2
GLUCOSE BLD-MCNC: 138 MG/DL (ref 70–99)
GLUCOSE BLDC GLUCOMTR-MCNC: 134 MG/DL (ref 70–99)
GLUCOSE BLDC GLUCOMTR-MCNC: 142 MG/DL (ref 70–99)
GLUCOSE BLDC GLUCOMTR-MCNC: 193 MG/DL (ref 70–99)
GLUCOSE BLDC GLUCOMTR-MCNC: 305 MG/DL (ref 70–99)
GLUCOSE BLDC GLUCOMTR-MCNC: 314 MG/DL (ref 70–99)
GLUCOSE BLDC GLUCOMTR-MCNC: 343 MG/DL (ref 70–99)
HCT VFR BLD AUTO: 29.7 % (ref 40–53)
HGB BLD-MCNC: 9.6 G/DL (ref 13.3–17.7)
MCH RBC QN AUTO: 27.1 PG (ref 26.5–33)
MCHC RBC AUTO-ENTMCNC: 32.3 G/DL (ref 31.5–36.5)
MCV RBC AUTO: 84 FL (ref 78–100)
PLATELET # BLD AUTO: 403 10E3/UL (ref 150–450)
POTASSIUM BLD-SCNC: 3.6 MMOL/L (ref 3.4–5.3)
RBC # BLD AUTO: 3.54 10E6/UL (ref 4.4–5.9)
SODIUM SERPL-SCNC: 136 MMOL/L (ref 133–144)
WBC # BLD AUTO: 13.9 10E3/UL (ref 4–11)

## 2023-01-17 PROCEDURE — 120N000001 HC R&B MED SURG/OB

## 2023-01-17 PROCEDURE — 97530 THERAPEUTIC ACTIVITIES: CPT | Mod: GP

## 2023-01-17 PROCEDURE — 250N000013 HC RX MED GY IP 250 OP 250 PS 637: Performed by: HOSPITALIST

## 2023-01-17 PROCEDURE — 85027 COMPLETE CBC AUTOMATED: CPT | Performed by: INTERNAL MEDICINE

## 2023-01-17 PROCEDURE — 250N000013 HC RX MED GY IP 250 OP 250 PS 637: Performed by: INTERNAL MEDICINE

## 2023-01-17 PROCEDURE — 80048 BASIC METABOLIC PNL TOTAL CA: CPT | Performed by: INTERNAL MEDICINE

## 2023-01-17 PROCEDURE — 97116 GAIT TRAINING THERAPY: CPT | Mod: GP

## 2023-01-17 PROCEDURE — 99222 1ST HOSP IP/OBS MODERATE 55: CPT | Performed by: INTERNAL MEDICINE

## 2023-01-17 PROCEDURE — 99239 HOSP IP/OBS DSCHRG MGMT >30: CPT | Performed by: INTERNAL MEDICINE

## 2023-01-17 PROCEDURE — 36415 COLL VENOUS BLD VENIPUNCTURE: CPT | Performed by: INTERNAL MEDICINE

## 2023-01-17 RX ORDER — CEFUROXIME AXETIL 500 MG/1
500 TABLET ORAL EVERY 12 HOURS
Qty: 14 TABLET | Refills: 0 | DISCHARGE
Start: 2023-01-17 | End: 2023-01-24

## 2023-01-17 RX ORDER — CEFUROXIME AXETIL 500 MG/1
500 TABLET ORAL EVERY 12 HOURS SCHEDULED
Status: DISCONTINUED | OUTPATIENT
Start: 2023-01-17 | End: 2023-01-18 | Stop reason: HOSPADM

## 2023-01-17 RX ADMIN — TAMSULOSIN HYDROCHLORIDE 0.4 MG: 0.4 CAPSULE ORAL at 07:42

## 2023-01-17 RX ADMIN — INSULIN GLARGINE 25 UNITS: 100 INJECTION, SOLUTION SUBCUTANEOUS at 07:43

## 2023-01-17 RX ADMIN — FERROUS SULFATE TAB 325 MG (65 MG ELEMENTAL FE) 325 MG: 325 (65 FE) TAB at 07:42

## 2023-01-17 RX ADMIN — CEFUROXIME AXETIL 500 MG: 500 TABLET ORAL at 14:07

## 2023-01-17 RX ADMIN — CEFUROXIME AXETIL 500 MG: 500 TABLET ORAL at 19:35

## 2023-01-17 RX ADMIN — INSULIN ASPART 4 UNITS: 100 INJECTION, SOLUTION INTRAVENOUS; SUBCUTANEOUS at 12:18

## 2023-01-17 RX ADMIN — INSULIN ASPART 5 UNITS: 100 INJECTION, SOLUTION INTRAVENOUS; SUBCUTANEOUS at 18:18

## 2023-01-17 ASSESSMENT — ACTIVITIES OF DAILY LIVING (ADL)
ADLS_ACUITY_SCORE: 44
ADLS_ACUITY_SCORE: 43
ADLS_ACUITY_SCORE: 44
ADLS_ACUITY_SCORE: 43
ADLS_ACUITY_SCORE: 44
ADLS_ACUITY_SCORE: 43
ADLS_ACUITY_SCORE: 43

## 2023-01-17 NOTE — PROGRESS NOTES
Notified provider about indwelling suh catheter discussed removal or continued need.    Did provider choose to remove indwelling suh catheter? No    Provider's suh indication for keeping indwelling suh catheter: retention    Is there an order for indwelling suh catheter? yes    *If there is a plan to keep suh catheter in place at discharge daily notification with provider is not necessary.

## 2023-01-17 NOTE — PROGRESS NOTES
Orientation/Cognitive: A/Ox4  Mobility Level/Assist Equipment: Ax1 Gb and walker  Fall Risk (Y/N): yes  Behavior Concerns: none  Pain Management: no pain reported this shift  Tele/VS/O2: VSS on RA  ABNL Lab/BG: BG checks ACHS, last was 314, covered for lunch  Diet: Mod carb  Bowel/Bladder: Continent of bowel no BM this shift, suh in place  Skin Concerns: head laceration, mepilex on sacral area  Drains/Devices: PIV SL  Tests/Procedures for next shift: discharge to TCU today  Anticipated DC date & active delays: today

## 2023-01-17 NOTE — PLAN OF CARE
Goal Outcome Evaluation:       A&Ox4. VSS on room air. Up Ao1 with GBW. Denies pain. Henderson intact and patent. Blood sugar check, insulin coverage per order. IV SL. Discharge pending placement.

## 2023-01-17 NOTE — CONSULTS
[facility]    Infectious Disease Consultation     Date of Admission:  1/11/2023  Date of Consult (When I saw the patient): 01/17/23    Assessment & Plan   [place] Tovar is a 72 year old male who was admitted on 1/11/2023.     Impression:  1. 72 year old male with PMHx of chronic urinary retention, chronic iron deficiency anemia and DM II   2. Amitted on 1/11/2023 with sepsis dt UTI as well as obstructive STEVE and hyponatremia  3 .Sepsis dt klebsiella pneumoniae UTI with associated klebsiella bacteremia  4. Toxic metabolic encephalopathy: Improved  5. Chronic issues include diabetes.   6. On ceftriaxone.     Recommendations:  Day 7 of ceftriaxone today can switch to PO cefin for 7 more days  When ready for discharge         Beatriz Asencio MD    Reason for Consult   Reason for consult: I was asked to evaluate this patient for urosepsis.    Primary Care Physician   Mallory Davila    Chief Complaint   encephalopathy     History is obtained from the patient and medical records    History of Present Illness   [place] Tovar is a 72 year old male who presented with fever, c onfusion found to have kleb UTI. Urosepsis, improving on antibiotics     Past Medical History   I have reviewed this patient's medical history and updated it with pertinent information if needed.   Past Medical History:   Diagnosis Date     Anemia, iron deficiency      Other and unspecified hyperlipidemia      Shingles 2013     Type II or unspecified type diabetes mellitus without mention of complication, not stated as uncontrolled     diet controlled       Past Surgical History   I have reviewed this patient's surgical history and updated it with pertinent information if needed.  Past Surgical History:   Procedure Laterality Date     CATARACT IOL, RT/LT  1997    bilaterally       Prior to Admission Medications   Prior to Admission Medications   Prescriptions Last Dose Informant Patient Reported? Taking?   ONE TOUCH ULTRA TEST VI STRP    No No   Sig: as directed    ferrous sulfate (FEROSUL) 325 (65 Fe) MG tablet 1/10/2023  Yes Yes   Sig: Take 325 mg by mouth daily   insulin glargine (LANTUS PEN) 100 UNIT/ML pen 1/11/2023 at 0930  Yes Yes   Sig: Inject 20 Units Subcutaneous every morning   linagliptin (TRADJENTA) 5 MG TABS tablet 1/11/2023  Yes Yes   Sig: Take 5 mg by mouth daily   metFORMIN (GLUCOPHAGE XR) 500 MG 24 hr tablet 1/11/2023 at 1200  Yes Yes   Sig: Take 500 mg by mouth 2 times daily   tamsulosin (FLOMAX) 0.4 MG capsule 1/11/2023 at AM  No Yes   Sig: Take 1 capsule (0.4 mg) by mouth daily      Facility-Administered Medications: None     Allergies   Allergies   Allergen Reactions     Sulfa Drugs Rash       Immunization History   Immunization History   Administered Date(s) Administered     COVID-19 Vaccine 12+ (Pfizer 2022) 08/19/2022     COVID-19 Vaccine 12+ (Pfizer) 03/19/2021, 04/09/2021     COVID-19 Vaccine Bivalent Booster 12+ (Pfizer) 11/10/2022     Influenza (IIV3) PF 12/05/2001, 10/17/2013     Pneumococcal 23 valent 06/21/2000     TD (ADULT, 7+) 06/04/2003       Social History   I have reviewed this patient's social history and updated it with pertinent information if needed. [place] T[place]  reports that he has quit smoking. His smoking use included cigarettes. He has never used smokeless tobacco. He reports that he does not currently use alcohol. He reports that he does not use drugs.    Family History   I have reviewed this patient's family history and updated it with pertinent information if needed.   Family History   Problem Relation Age of Onset     Eye Disorder Mother      Alzheimer Disease Mother      Diabetes Father      Cancer - colorectal Father      Lung Cancer Father      Cancer Maternal Grandmother         kidney     Cancer Maternal Grandfather         lung cancer-smoker and      Cancer Paternal Grandmother         leukemia     Unknown/Adopted Paternal Grandfather        Review of Systems   The 10 point Review of  Systems is negative other than hpi    Physical Exam   Temp: 98  F (36.7  C) Temp src: Oral BP: 106/67 Pulse: 84   Resp: 16 SpO2: 99 % O2 Device: None (Room air)    Vital Signs with Ranges  Temp:  [98  F (36.7  C)-98.1  F (36.7  C)] 98  F (36.7  C)  Pulse:  [84-98] 84  Resp:  [16] 16  BP: (106-147)/(67-83) 106/67  SpO2:  [98 %-99 %] 99 %  125 lbs 3.54 oz  Body mass index is 17.97 kg/m .    GENERAL APPEARANCE:  awake  EYES: Eyes grossly normal to inspection  NECK: no adenopathy  RESP: lungs clear   CV: regular rates and rhythm  LYMPHATICS: normal ant/post cervical and supraclavicular nodes  ABDOMEN: soft, nontender  MS: extremities normal  SKIN: no suspicious lesions or rashes        Data   Lab Results   Component Value Date    WBC 13.9 (H) 01/17/2023    HGB 9.6 (L) 01/17/2023    HCT 29.7 (L) 01/17/2023     01/17/2023     01/17/2023    POTASSIUM 3.6 01/17/2023    CHLORIDE 101 01/17/2023    CO2 28 01/17/2023    BUN 40 (H) 01/17/2023    CR 1.19 01/17/2023     (H) 01/17/2023    AST 61 (H) 01/11/2023    ALT 74 (H) 01/11/2023    ALKPHOS 187 (H) 01/11/2023    BILITOTAL 1.1 01/11/2023    INR 1.20 (H) 09/30/2022     No results for input(s): CULT in the last 168 hours.  No lab results found.    Invalid input(s): UC       All cultures:  Recent Labs   Lab 01/11/23 2234 01/11/23 2150   CULTURE No Growth  Positive on the 1st day of incubation*  Klebsiella pneumoniae* >100,000 CFU/mL Klebsiella pneumoniae*  50,000-100,000 CFU/mL Klebsiella pneumoniae*      Blood culture:  Results for orders placed or performed during the hospital encounter of 01/11/23   Blood Culture Peripheral Blood    Specimen: Peripheral Blood   Result Value Ref Range    Culture No Growth    Blood Culture Peripheral Blood    Specimen: Peripheral Blood   Result Value Ref Range    Culture Positive on the 1st day of incubation (A)     Culture Klebsiella pneumoniae (AA)        Susceptibility    Klebsiella pneumoniae - EL     Ampicillin*   Resistant       * Intrinsically Resistant     Ampicillin/ Sulbactam <=2 Susceptible ug/mL     Piperacillin/Tazobactam <=4 Susceptible ug/mL     Ceftazidime <=1 Susceptible ug/mL     Ceftriaxone <=1 Susceptible ug/mL     Cefepime <=1 Susceptible ug/mL     Meropenem <=0.25 Susceptible ug/mL     Gentamicin <=1 Susceptible ug/mL     Tobramycin <=1 Susceptible ug/mL     Ciprofloxacin <=0.25 Susceptible ug/mL     Levofloxacin <=0.12 Susceptible ug/mL     Trimethoprim/Sulfamethoxazole <=1/19 Susceptible ug/mL   Results for orders placed or performed during the hospital encounter of 10/22/22   Blood Culture Arm, Right    Specimen: Arm, Right; Blood   Result Value Ref Range    Culture No Growth    Blood Culture Arm, Left    Specimen: Arm, Left; Blood   Result Value Ref Range    Culture No Growth       Urine culture:  Results for orders placed or performed during the hospital encounter of 01/11/23   Urine Culture    Specimen: Urine, Henderson Catheter   Result Value Ref Range    Culture >100,000 CFU/mL Klebsiella pneumoniae (A)     Culture 50,000-100,000 CFU/mL Klebsiella pneumoniae (A)        Susceptibility    Klebsiella pneumoniae - EL     Ampicillin*  Resistant       * Intrinsically Resistant     Ampicillin/ Sulbactam <=2 Susceptible ug/mL     Piperacillin/Tazobactam <=4 Susceptible ug/mL     Cefazolin* <=4 Susceptible ug/mL      * Cefazolin EL breakpoints are for the treatment of uncomplicated urinary tract infections. For the treatment of systemic infections, please contact the laboratory for additional testing.     Cefoxitin <=4 Susceptible ug/mL     Ceftazidime <=1 Susceptible ug/mL     Ceftriaxone <=1 Susceptible ug/mL     Cefepime <=1 Susceptible ug/mL     Gentamicin <=1 Susceptible ug/mL     Tobramycin <=1 Susceptible ug/mL     Ciprofloxacin <=0.25 Susceptible ug/mL     Levofloxacin <=0.12 Susceptible ug/mL     Nitrofurantoin 64 Intermediate ug/mL     Trimethoprim/Sulfamethoxazole <=1/19 Susceptible ug/mL     Klebsiella pneumoniae - EL     Ampicillin*  Resistant       * Intrinsically Resistant     Ampicillin/ Sulbactam <=2 Susceptible ug/mL     Piperacillin/Tazobactam <=4 Susceptible ug/mL     Cefazolin* <=4 Susceptible ug/mL      * Cefazolin EL breakpoints are for the treatment of uncomplicated urinary tract infections. For the treatment of systemic infections, please contact the laboratory for additional testing.     Cefoxitin <=4 Susceptible ug/mL     Ceftazidime <=1 Susceptible ug/mL     Ceftriaxone <=1 Susceptible ug/mL     Cefepime <=1 Susceptible ug/mL     Gentamicin <=1 Susceptible ug/mL     Tobramycin <=1 Susceptible ug/mL     Ciprofloxacin <=0.25 Susceptible ug/mL     Levofloxacin <=0.12 Susceptible ug/mL     Nitrofurantoin 32 Susceptible ug/mL     Trimethoprim/Sulfamethoxazole <=1/19 Susceptible ug/mL   Results for orders placed or performed during the hospital encounter of 10/22/22   Urine Culture    Specimen: Urine, Henderson Catheter   Result Value Ref Range    Culture >100,000 CFU/mL Candida albicans (A)    Results for orders placed or performed during the hospital encounter of 09/30/22   Urine Culture    Specimen: Urine, Catheter   Result Value Ref Range    Culture <10,000 CFU/mL Candida albicans (A)

## 2023-01-17 NOTE — PLAN OF CARE
Pt A&Ox4; VSS on RA. Denies pain or SOB this shift. Henderson cath in place; good urine output noted. IV SL. Laceration on L forehead appears to be healing well. Dressing on coccyx intact. Slept most of this shift. Possible discharge to TCU today.

## 2023-01-17 NOTE — DISCHARGE SUMMARY
[employer]    Discharge Summary  Hospitalist    Date of Admission:  1/11/2023  Date of Discharge:  1/18/2023  Discharging Provider: Courtney Conn DO    Discharge Diagnoses   Sepsis dt klebsiella pneumoniae UTI with associated klebsiella bacteremia  Toxic metabolic encephalopathy: Improved  STEVE, dt recurrent urinary retention s/p suh catheter placement  BPH  Acute hypochloremic hyponatremia: Improved  Hyperkalemia: Resolved  Chronic iron deficiency anemia  Left temporal laceration  DM II  Severe protein calorie malnutrition    History of Present Illness   [place] is a 72 year old male with PMHx of chronic urinary retention, chronic iron deficiency anemia and DM II who was admitted on 1/11/2023 with sepsis dt UTI as well as obstructive STEVE and hyponatremia.    Hospital Course   [place] was admitted on 1/11/2023.  The following problems were addressed during his hospitalization:    Sepsis dt klebsiella pneumoniae UTI with associated klebsiella bacteremia  Toxic metabolic encephalopathy: Improved  * Had been seen in ED in 9/2022 for sx of UTI and urinary retention. At that time, was found to have bilateral hydroureter and hydronephrosis with thickened bladder. Was treated with course of abx (cephalosporins). Follow up with [facility]. Was started on Flomax. Underwent an additional round of abx. Was then hospitalized at [facility] from 10/22/22-10/24/22 with complicated UTI (CAUTI) dt candida in setting of uncontrolled DM. Seen by ID that stay and treated with 2wk course of fluconazole. After discharge, he was seen by his Urology team in 11/2022 and his catheter was removed. He also has chronic anemia, and an outpatient EGD and colonoscopy were done in 11/2022 by WENDY NDIAYE, and reportedly a colonic mass was seen, as discussed below; he had a follow up CT of chest, abdomen and pelvis at that time as an outpatient (on 11/25/22) which reportedly showed improved bladder wall  thickening and distension with improved hydronephrosis bilaterally, as well as prostatomegaly.  * Presented this stay for evaluation of 1-2wk hx of progressive severe fatigue and recent fall.   * In ED, was afebrile (Tmax 99), tachycardic but BPs stable. Was notably disoriented. +urinary retention with 1000ml urine out when suh was placed in the ED. Labs notable for WBC 23 and platelet 596; Na 122, K 5.5, Cr 3.8, bicarb 17. Lactate nl. UA notable for pyuria. Head CT neg.   * Clinical picture on admission was for sepsis dt recurrent UTI with associated STEVE, electrolyte disturbance and encephalopathy. Was given IVFs and started on ceftriaxone.  * 1/2 blood cultures drawn on admission subsequently grew klebsiella pneumoniae (resistent to ampicillin only). UC grew >100k klebsiella pneumonia and a second strain of 50-100k klebsiella.  * WBC improvied, fevers resolved, mentation improved  * Managed with ceftriaxone 2g daily x7d this stay. ID consulted, transitioned to 7d course of Ceftin at discharge (to complete 14d course of treatment).      STEVE, dt recurrent urinary retention s/p suh catheter placement  BPH  Acute hypochloremic hyponatremia: Improved  Hyperkalemia: Resolved  * Baseline Cr is around 1.   * Presentation as above. Cr 3.86 on admission with associated metabolic acidosis and hyponatremia (Na 122), K 5.5. Suspected secondary to obstruction and hypovolemia. Noted to have urinary retention requiring suh placement in ED. FENA 4.7.   * Renal US this stay showed bilateral hydronephrosis and hydroureter bilaterally, partially distended urinary bladder with suh in place and dependent debris, vague echogenic nodule in left kidney (could consider enhanced MRI).   * Renal function improved with catheter and IVFs. Na also improved. K normalized.   * Seen by [facility] following this stay -- recommended follow up with primary urologist in after discharge, should keep suh in place for 2 wks  * Lytes and Cr back  to baseline as of 1/16, IVFs dc'd  * Continued on Flomax this stay.   * Discharged with suh catheter in place, follow up with primary urologist after discharge, consider repeat imaging to demonstrate resolution of hydronephrosis     Chronic iron deficiency anemia  * As above, underwent recent EGD and colonoscopy in 11/2022. Was found to have polyps in the colon and also in the small intestine, and has seen a surgeon with recommendation for possible resection of part of his small intestine. He is markedly cachectic, with severe recent weight loss, and could have intestinal cancer. No further details of the endoscopy results or the recommended care plan at present.   * Hgb had been around 8 in 10/2022. Hgb 12 on admission this stay. After IVFs down to 9-10.   * Hgb remained stable at 9-10, cont oral iron.   * Follow up with PCP and surgery as advised     Left temporal laceration  * Secondary to recent fall. Repaired with sutures in ED. Sutures removed on 1/16.      DM II  * A1c 6.6 in 10/2022. Manages with Lantus 20U daily, Metformin and Linagliptin.  * Managed with sliding scale insulin initially. Lantus resumed and dose increased to 25U daily this stay. Will cont 25U daily at discharge.   * Metformin and Linagliptin held this stay dt STEVE, resumed on discharge.     Severe protein calorie malnutrition  * Noted per nutritionist. Cont supplements between meals.     Courtney Conn, DO    Code Status   Full Code       Primary Care Physician   Mallory Davila    Physical Exam   Temp: 98  F (36.7  C) Temp src: Oral BP: 106/67 Pulse: 84   Resp: 16 SpO2: 99 % O2 Device: None (Room air)    Vitals:    01/13/23 0622 01/14/23 0700 01/17/23 0409   Weight: 54.3 kg (119 lb 11.4 oz) 54.6 kg (120 lb 5.9 oz) 56.8 kg (125 lb 3.5 oz)     Vital Signs with Ranges  Temp:  [98  F (36.7  C)-98.1  F (36.7  C)] 98  F (36.7  C)  Pulse:  [84-98] 84  Resp:  [16] 16  BP: (106-147)/(67-83) 106/67  SpO2:  [98 %-99 %] 99 %  I/O last 3  completed shifts:  In: -   Out: 3000 [Urine:3000]    General: Cachectic appearing male, resting comfortably, alert and conversing appropriately, NAD  CVS: HRRR, no MGR, no LE edema  Respiratory: CTAB, no wheeze/rales/rhonchi, no increased work of breathing  GI:  S, NT, ND, +BS  : suh draining clear yellow urine  Skin: Warm/dry, laceration on forehead healing well   Neuro: CNs 2-12 intact, no focal motor/sensory deficits    Discharge Disposition   Discharged to TCU  Condition at discharge: Stable    Consultations This Hospital Stay   UROLOGY IP CONSULT  INFECTIOUS DISEASES IP CONSULT  ---------------------------------  NUTRITION SERVICES ADULT IP CONSULT  PHYSICAL THERAPY ADULT IP CONSULT  OCCUPATIONAL THERAPY ADULT IP CONSULT  CARE MANAGEMENT / SOCIAL WORK IP CONSULT    Time Spent on this Encounter   ICourtney DO, personally saw the patient today and spent greater than 30 minutes discharging this patient.    Discharge Orders      Follow Up ([facility])    Follow up with your [facility] urology team after hospital discharge.  You had a Suh catheter placed due to urinary retention on 1/11/23.  We recommend keeping this in place for 2 weeks until you can see your primary urologist.     General info for SNF    Length of Stay Estimate: Short Term Care: Estimated # of Days <30  Condition at Discharge: Improving  Level of care:skilled   Rehabilitation Potential: Excellent  Admission H&P remains valid and up-to-date: Yes  Recent Chemotherapy: N/A  Use Nursing Home Standing Orders: Yes     Mantoux instructions    Give two-step Mantoux (PPD) Per Facility Policy Yes     Reason for your hospital stay    Management of your infection, which was due to a UTI and concurrent blood stream infection. Additionally, you had acute kidney injury secondary to difficulties urinating and required placement of a suh catheter.     Glucose monitor nursing POCT    Before meals and at bedtime     Suh catheter    To  straight gravity drainage. Change catheter every 2 weeks and PRN for leaking or decreased urine output with signs of bladder distention. DO NOT change catheter without a specific Provider order IF diagnosis of benign prostatic hypertrophy (BPH), neurogenic bladder, or other urological conditions     Activity - Up with nursing assistance     Follow Up and recommended labs and tests    Follow up with facility physician in 2-3 days.   Follow up with your primary urologist thru [facility] after discharge.     Full Code     Physical Therapy Adult Consult    Evaluate and treat as clinically indicated.    Reason:  UTI, bacteremia, physical deconditioning     Occupational Therapy Adult Consult    Evaluate and treat as clinically indicated.    Reason: UTI, bacteremia, physical deconditioning     Diet    Follow this diet upon discharge: Regular     Discharge Medications   Current Discharge Medication List      START taking these medications    Details   cefuroxime (CEFTIN) 500 MG tablet Take 1 tablet (500 mg) by mouth every 12 hours for 7 days  Qty: 14 tablet, Refills: 0    Associated Diagnoses: Urinary tract infection associated with indwelling urethral catheter, initial encounter (H); Bacteremia due to Klebsiella pneumoniae         CONTINUE these medications which have CHANGED    Details   insulin glargine (LANTUS PEN) 100 UNIT/ML pen Inject 25 Units Subcutaneous every morning  Qty: 15 mL    Comments: If Lantus is not covered by insurance, may substitute Basaglar or Semglee or other insulin glargine product per insurance preference at same dose and frequency.    Associated Diagnoses: Type 2 diabetes mellitus without complication, with long-term current use of insulin (H)         CONTINUE these medications which have NOT CHANGED    Details   ferrous sulfate (FEROSUL) 325 (65 Fe) MG tablet Take 325 mg by mouth daily      linagliptin (TRADJENTA) 5 MG TABS tablet Take 5 mg by mouth daily      metFORMIN (GLUCOPHAGE XR) 500  MG 24 hr tablet Take 500 mg by mouth 2 times daily      tamsulosin (FLOMAX) 0.4 MG capsule Take 1 capsule (0.4 mg) by mouth daily  Qty: 30 capsule, Refills: 0    Associated Diagnoses: Urinary retention with incomplete bladder emptying      ONE TOUCH ULTRA TEST VI STRP as directed   Qty: 100, Refills: 6    Associated Diagnoses: Type II or unspecified type diabetes mellitus without mention of complication, not stated as uncontrolled           Allergies   Allergies   Allergen Reactions     Sulfa Drugs Rash     Data   Most Recent 3 CBC's:Recent Labs   Lab Test 01/17/23  0554 01/16/23  0700 01/15/23  0700   WBC 13.9* 15.9* 17.8*   HGB 9.6* 9.4* 9.6*   MCV 84 83 84    430 432      Most Recent 3 BMP's:  Recent Labs   Lab Test 01/17/23  0728 01/17/23  0645 01/17/23  0554 01/16/23  1229 01/16/23  0700 01/15/23  1201 01/15/23  0700   NA  --   --  136  --  135  --  131*   POTASSIUM  --   --  3.6  --  3.8  --  3.9   CHLORIDE  --   --  101  --  102  --  102   CO2  --   --  28  --  27  --  22   BUN  --   --  40*  --  42*  --  52*   CR  --   --  1.19  --  1.21  --  1.41*   ANIONGAP  --   --  7  --  6  --  7   MARNI  --   --  8.9  --  8.8  --  8.4*   * 134* 138*   < > 180*   < > 205*    < > = values in this interval not displayed.     Most Recent 2 LFT's:  Recent Labs   Lab Test 01/11/23  2100 09/30/22  1029   AST 61* 38   ALT 74* 37   ALKPHOS 187* 165*   BILITOTAL 1.1 0.7       Results for orders placed or performed during the hospital encounter of 01/11/23   Head CT w/o contrast    Narrative    EXAM: CT HEAD W/O CONTRAST  LOCATION: [facility]  DATE/TIME: 1/11/2023 4:49 PM    INDICATION: fall, head injury  COMPARISON: None.  TECHNIQUE: Routine CT Head without IV contrast. Multiplanar reformats. Dose reduction techniques were used.    FINDINGS:  INTRACRANIAL CONTENTS: No intracranial hemorrhage, extraaxial collection, or mass effect.  No CT evidence of acute infarct. Mild presumed chronic small  vessel ischemic changes. Normal ventricles and sulci.     VISUALIZED ORBITS/SINUSES/MASTOIDS: No intraorbital abnormality. Previous cataract surgery. No paranasal sinus mucosal disease. No middle ear or mastoid effusion.    BONES/SOFT TISSUES: No acute abnormality.      Impression    IMPRESSION:  1.  Normal head CT.   US Renal Complete Non-Vascular    Narrative    EXAM: US RENAL COMPLETE NON-VASCULAR  LOCATION: [facility]  DATE/TIME: 1/12/2023 2:32 AM    INDICATION: Recurrent obstructive STEVE. UTI. Sepsis.  COMPARISON: CT chest, abdomen and pelvis with IV contrast 11/25/2022.  TECHNIQUE: Routine Bilateral Renal and Bladder Ultrasound.    FINDINGS:    RIGHT KIDNEY: 13.0 x 7.8 x 6.1 cm. Mild hydronephrosis and hydroureter. No stones or masses. Normal parenchymal thickness and echogenicity.     LEFT KIDNEY: 12.0 x 6.9 x 6.2 cm. Mild hydronephrosis and hydroureter. Small milk of calcium cyst in the midportion measures 1.6 x 1.4 x 1.6 cm. Another vague echogenicity slightly inferiorly measures 1.6 cm, in retrospect, corresponding to an   indeterminate hypodense lesion seen on prior CT (image 60, series 3, image 79, series 2). No stones. Normal parenchymal thickness and echogenicity.     BLADDER: Partially distended with a Henderson catheter in situ. Dependent debris in the urinary bladder.      Impression    IMPRESSION:  1.  Bilateral hydronephrosis and hydroureter bilaterally. Partially distended urinary bladder with a Henderson catheter in situ. Dependent debris in the urinary bladder.    2.  Vague echogenic nodule in the left kidney inferiorly, in retrospect corresponding to an indeterminate hypodense lesion seen on prior CT. Consider further assessment using dedicated dynamic enhanced MR, if technically feasible. Milk of calcium cyst in   the left mid kidney, unchanged.    NOTE: ABNORMAL REPORT    THE DICTATION ABOVE DESCRIBES AN ABNORMALITY FOR WHICH FOLLOW-UP IS NEEDED.

## 2023-01-18 ENCOUNTER — MEDICAL CORRESPONDENCE (OUTPATIENT)
Dept: HEALTH INFORMATION MANAGEMENT | Facility: CLINIC | Age: 73
End: 2023-01-18

## 2023-01-18 VITALS
BODY MASS INDEX: 16.41 KG/M2 | HEART RATE: 90 BPM | SYSTOLIC BLOOD PRESSURE: 124 MMHG | WEIGHT: 114.64 LBS | DIASTOLIC BLOOD PRESSURE: 66 MMHG | HEIGHT: 70 IN | TEMPERATURE: 99 F | OXYGEN SATURATION: 99 % | RESPIRATION RATE: 18 BRPM

## 2023-01-18 LAB
GLUCOSE BLDC GLUCOMTR-MCNC: 157 MG/DL (ref 70–99)
GLUCOSE BLDC GLUCOMTR-MCNC: 203 MG/DL (ref 70–99)
GLUCOSE BLDC GLUCOMTR-MCNC: 213 MG/DL (ref 70–99)

## 2023-01-18 PROCEDURE — 99232 SBSQ HOSP IP/OBS MODERATE 35: CPT | Performed by: INTERNAL MEDICINE

## 2023-01-18 PROCEDURE — 250N000013 HC RX MED GY IP 250 OP 250 PS 637: Performed by: HOSPITALIST

## 2023-01-18 PROCEDURE — 250N000013 HC RX MED GY IP 250 OP 250 PS 637: Performed by: INTERNAL MEDICINE

## 2023-01-18 RX ADMIN — TAMSULOSIN HYDROCHLORIDE 0.4 MG: 0.4 CAPSULE ORAL at 09:01

## 2023-01-18 RX ADMIN — FERROUS SULFATE TAB 325 MG (65 MG ELEMENTAL FE) 325 MG: 325 (65 FE) TAB at 09:01

## 2023-01-18 RX ADMIN — INSULIN ASPART 2 UNITS: 100 INJECTION, SOLUTION INTRAVENOUS; SUBCUTANEOUS at 13:18

## 2023-01-18 RX ADMIN — INSULIN ASPART 1 UNITS: 100 INJECTION, SOLUTION INTRAVENOUS; SUBCUTANEOUS at 09:07

## 2023-01-18 RX ADMIN — CEFUROXIME AXETIL 500 MG: 500 TABLET ORAL at 09:01

## 2023-01-18 RX ADMIN — INSULIN GLARGINE 25 UNITS: 100 INJECTION, SOLUTION SUBCUTANEOUS at 09:07

## 2023-01-18 ASSESSMENT — ACTIVITIES OF DAILY LIVING (ADL)
ADLS_ACUITY_SCORE: 44

## 2023-01-18 NOTE — PLAN OF CARE
Goal Outcome Evaluation:  Patient is alert and oriented X4. VSS on RA. Up with assist of 1 with gait belt . On mod carb diet with good appetite. PIV saline locked. Patient denies pain. BS monitored and sliding insulin was given per sliding scale parameter. Continent of bowel, suh catheter in place with minimum output. Discharge plan for tomorrow morning.

## 2023-01-18 NOTE — PROGRESS NOTES
CLINICAL NUTRITION SERVICES - REASSESSMENT NOTE    Recommendations Ordered by Registered Dietitian (RD):   Vanilla Ensure BID between meals  Encouraged small meals TID + 2 supplements/day for repletion    Malnutrition: (1/13)   % Weight Loss:  > 10% in 6 months (severe malnutrition)  % Intake:  </= 50% for >/= 5 days (severe malnutrition)  Subcutaneous Fat Loss:  Orbital region severe depletion and Upper arm region severe depletion  Muscle Loss:  Temporal region severe depletion, Clavicle bone region severe depletion, Acromion bone region severe depletion and Dorsal hand region severe depletion  Fluid Retention:  None noted     Malnutrition Diagnosis: Severe malnutrition  In Context of:  Acute illness or injury  Chronic illness or disease     EVALUATION OF PROGRESS TOWARD GOALS   Diet: Moderate Consistent CHO  Ensure Between meals     Intake/Tolerance:   - Pt reports ordering meals TID, and eating %. Orders something different each meal to keep up variety. Has ordered the omelet, pot roast, fish for protein sources and is getting Ensure BID. Appetite is fair. He likes the ensure and drinks it BID.   - Labs:   Recent Labs   Lab 01/18/23  0734 01/18/23  0143 01/17/23  2102 01/17/23  1652 01/17/23  1201 01/17/23  0728   * 213* 305* 343* 314* 142*     - Stooling: Bm x1 on 1/15  - Weight: 52 kg today, lowest of admission. Down 18% total in 3-4 months.     - Meds: Iron 65 mg elemental, Med sliding scale insulin + 25 units lantus before breakfast      ASSESSED NUTRITION NEEDS:  Dosing Weight 54.3 kg (1/13)  Estimated Energy Needs: 2105-5322+ kcals (30-35 Kcal/Kg)  Justification: repletion  Estimated Protein Needs: 70-80+ grams protein (1.3-1.5 g pro/Kg)  Justification: Repletion  Estimated Fluid Needs: 3277-0011  mL (1 mL/Kcal)  Justification: maintenance or per provider pending fluid status     NEW FINDINGS:   - Disposition: discharge to TCU as soon as today     Previous Goals:   Patient will consume >/= 75%  meals TID + 2 supplements/day  Evaluation: Met on average (% + supplements)   Weight >/= 54 kg this admission   Evaluation: Not met    Previous Nutrition Diagnosis:   Unintended weight loss related to decreased oral intake as evidenced by 14% weight loss in the past 3-6 months with evidence of severe fat and muscle wasting   Evaluation: No change    CURRENT NUTRITION DIAGNOSIS  Unintended weight loss related to decreased oral intake as evidenced by >14% weight loss in the past 3-6 months with evidence of severe fat and muscle wasting     INTERVENTIONS  Recommendations / Nutrition Prescription  Diet per MD (Mod CHO)  Ensure Enlive BID    Implementation  None new - provided encouragement     Goals  Intake of at least 75% meals TID and supplements BID.     MONITORING AND EVALUATION:  Progress towards goals will be monitored and evaluated per protocol and Practice Guidelines    Ara Orourke RD, LD  [facility] [place], [facility], Orth[facility], [facility]  Pager: [phone]  Weekend Pager: [phone]

## 2023-01-18 NOTE — PROGRESS NOTES
Care Management Discharge Note    Discharge Date: 01/18/2023       Discharge Disposition: Transitional Care, Skilled Nursing Facility    Discharge Services: None    Discharge DME: None    Discharge Transportation: family or friend will provide    Private pay costs discussed: Not applicable    PAS Confirmation Code:  [id number]  Patient/family educated on Medicare website which has current facility and service quality ratings: yes    Education Provided on the Discharge Plan:  yes  Persons Notified of Discharge Plans: patient  Patient/Family in Agreement with the Plan: yes    Handoff Referral Completed: Yes    Additional Information:  Discharge orders received. Patient discharging to [facility] at 1700 via [employer] w/[facility] ride. Writer faxed discharge orders to [facility]. Writer completed PAS and put in chart, sent to facility.    PAS-RR    D: Per DHS regulation, SW completed and submitted PAS-RR to [place] [employer] Direct [employer] via the Senior LinkAge Line.  PAS-RR confirmation # is : [id number]    I: SW spoke with patient and they are aware a PAS-RR has been submitted.  SW reviewed with patient that they may be contacted for a follow up appointment within 10 days of hospital discharge if their SNF stay is < 30 days.  Contact information for Senior LinkAge Line was also provided.    A:Patient verbalized understanding.    P: Further questions may be directed to [facility] LinkAge Line at #[phone], option #4 for PAS-RR staff.    CAMILA Hawkins

## 2023-01-18 NOTE — CARE PLAN
Pt is A & O x 4. Lungs anthony d clear, bowel sounds active. Cms intact except for edema in feet. Denies pain. Was discharged to tcu.

## 2023-01-18 NOTE — PROGRESS NOTES
Care Management Follow Up    Length of Stay (days): 7    Expected Discharge Date: 01/18/2023     Concerns to be Addressed:       Patient plan of care discussed at interdisciplinary rounds: Yes    Anticipated Discharge Disposition: Transitional Care, Skilled Nursing Facility     Anticipated Discharge Services: None  Anticipated Discharge DME: None    Patient/family educated on Medicare website which has current facility and service quality ratings: yes  Education Provided on the Discharge Plan:    Patient/Family in Agreement with the Plan: yes    Referrals Placed by CM/SW:    Private pay costs discussed: Private room and transportation     Additional Information:  Writer messaged Joya at [facility] asking when they could accept patient today since discharge orders are in. Joya stated that they aren't able to take patient until 1/19 now due to staffing challenges. Writer sent another tcu referral to [place] and [facility] to see if they could take patient today.    [facility] accepted patient in a private room ($36 a day - waived first week). Writer let patient know that [facility] can take today and [facility] can't accept till tomorrow. He wanted to go today to [facility] and is okay with the cost of private room and transport. Writer called [facility] and scheduled a w/c ride for today at 1700. Writer let [facility] know of ride time.    CAMILA Hawkins

## 2023-01-18 NOTE — PLAN OF CARE
Goal Outcome Evaluation:           Summary:  1-18-23, [id number]    Patient is A/O x4. VSS on RA. Denies pain, n/v and SOB. Lungs clear. CMS intact, some LE edema. Up with assist of 1 with gait belt . On mod carb diet , 2 AM . PIV saline locked. Patient denies pain. Continent of bowel no BM, suh catheter in place with good output. Discharge plan for tomorrow morning to TCU.

## 2023-01-18 NOTE — PROGRESS NOTES
[facility]    Hospitalist Progress Note    Assessment & Plan   [place] is a 72 year old male with PMHx of chronic urinary retention, chronic iron deficiency anemia and DM II who was admitted on 1/11/2023 with sepsis dt UTI as well as obstructive STEVE and hyponatremia    Sepsis dt klebsiella pneumoniae UTI with associated klebsiella bacteremia  Toxic metabolic encephalopathy: Improved  * Had been seen in ED in 9/2022 for sx of UTI and urinary retention. At that time, was found to have bilateral hydroureter and hydronephrosis with thickened bladder. Was treated with course of abx (cephalosporins). Follow up with [facility]. Was started on Flomax. Underwent an additional round of abx. Was then hospitalized at [facility] from 10/22/22-10/24/22 with complicated UTI (CAUTI) dt candida in setting of uncontrolled DM. Seen by ID that stay and treated with 2wk course of fluconazole. After discharge, he was seen by his Urology team in 11/2022 and his catheter was removed. He also has chronic anemia, and an outpatient EGD and colonoscopy were done in 11/2022 by WENDY NDIAYE, and reportedly a colonic mass was seen, as discussed below; he had a follow up CT of chest, abdomen and pelvis at that time as an outpatient (on 11/25/22) which reportedly showed improved bladder wall thickening and distension with improved hydronephrosis bilaterally, as well as prostatomegaly.  * Presented this stay for evaluation of 1-2wk hx of progressive severe fatigue and recent fall.   * In ED, was afebrile (Tmax 99), tachycardic but BPs stable. Was notably disoriented. +urinary retention with 1000ml urine out when suh was placed in the ED. Labs notable for WBC 23 and platelet 596; Na 122, K 5.5, Cr 3.8, bicarb 17. Lactate nl. UA notable for pyuria. Head CT neg.   * Clinical picture on admission was for sepsis dt recurrent UTI with associated STEVE, electrolyte disturbance and encephalopathy. Was given IVFs and started on  ceftriaxone.  * 1/2 blood cultures drawn on admission subsequently grew klebsiella pneumoniae (resistent to ampicillin only). UC grew >100k klebsiella pneumonia and a second strain of 50-100k klebsiella.  * WBC improvied, fevers resolved, mentation improved  * Managed with ceftriaxone 2g daily x7d this stay. ID consulted, transitioned to 7d course of Ceftin at discharge (to complete 14d course of treatment).      STEVE, dt recurrent urinary retention s/p suh catheter placement  BPH  Acute hypochloremic hyponatremia: Improved  Hyperkalemia: Resolved  * Baseline Cr is around 1.   * Presentation as above. Cr 3.86 on admission with associated metabolic acidosis and hyponatremia (Na 122), K 5.5. Suspected secondary to obstruction and hypovolemia. Noted to have urinary retention requiring suh placement in ED. FENA 4.7.   * Renal US this stay showed bilateral hydronephrosis and hydroureter bilaterally, partially distended urinary bladder with suh in place and dependent debris, vague echogenic nodule in left kidney (could consider enhanced MRI).   * Renal function improved with catheter and IVFs. Na also improved. K normalized.   * Seen by [facility] following this stay -- recommended follow up with primary urologist in after discharge, should keep suh in place for 2 wks  * Lytes and Cr back to baseline as of 1/16, IVFs dc'd  * Continued on Flomax this stay.   * Discharged with suh catheter in place, follow up with primary urologist after discharge, consider repeat imaging to demonstrate resolution of hydronephrosis     Chronic iron deficiency anemia  * As above, underwent recent EGD and colonoscopy in 11/2022. Was found to have polyps in the colon and also in the small intestine, and has seen a surgeon with recommendation for possible resection of part of his small intestine. He is markedly cachectic, with severe recent weight loss, and could have intestinal cancer. No further details of the endoscopy results or  the recommended care plan at present.   * Hgb had been around 8 in 10/2022. Hgb 12 on admission this stay. After IVFs down to 9-10.   * Hgb remained stable at 9-10, cont oral iron.   * Follow up with PCP and surgery as advised     Left temporal laceration  * Secondary to recent fall. Repaired with sutures in ED. Sutures removed on 1/16.      DM II  * A1c 6.6 in 10/2022. Manages with Lantus 20U daily, Metformin and Linagliptin.  * Managed with sliding scale insulin initially. Lantus resumed and dose increased to 25U daily this stay. Will cont 25U daily at discharge.   * Metformin and Linagliptin held this stay dt STEVE, resume on discharge.      Severe protein calorie malnutrition  * Noted per nutritionist. Cont supplements between meals.     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 Was medically stable for discharge and accepted to TCU on 1/17 (with report TCU had a bed available for him on 1/17). Unclear why patient didn't discharge on 1/17, no SW note in chart indicating why discharge was delayed. Discussed with SW this AM, will discharge to TCU today once bed availability is confirmed.     See discharge summary completed by me from 1/17/23 for specifics of hospital stay.    Courtney Conn, DO    Medical Decision Making       -------------------------- BILLING ON TIME ------------------------------------------------------------------------------------------------------------  35 MINUTES SPENT BY ME on the date of service doing chart review, history, exam, documentation & further activities per the note.         Interval History    Was supposed to discharge to TCU yesterday (hospitalist was told TCU had a bed and could accept him on 1/17), discharge orders were placed but unclear why he didn't discharge -- no SW note in chart  indicating why discharge was delayed. Remains medically stable. No concerns per discussion with patient and bedside RN this AM.     -Data reviewed today: I reviewed all new labs and imaging results over the last 24 hours. I personally reviewed no images or EKG's today.    Physical Exam   Temp: 98.1  F (36.7  C) Temp src: Oral BP: 93/64 Pulse: 92   Resp: 16 SpO2: 98 % O2 Device: None (Room air)    Vitals:    01/14/23 0700 01/17/23 0409 01/18/23 0636   Weight: 54.6 kg (120 lb 5.9 oz) 56.8 kg (125 lb 3.5 oz) 52 kg (114 lb 10.2 oz)     Vital Signs with Ranges  Temp:  [98.1  F (36.7  C)-98.5  F (36.9  C)] 98.1  F (36.7  C)  Pulse:  [90-93] 92  Resp:  [16] 16  BP: ()/(64-70) 93/64  SpO2:  [98 %] 98 %  I/O last 3 completed shifts:  In: 240 [P.O.:240]  Out: 3100 [Urine:3100]    Constitutional: Cachectic appearing male, resting comfortably, alert and conversing appropriately, NAD  Respiratory: CTAB, no wheeze/rales/rhonchi, no increased work of breathing  Cardiovascular: HRRR, no MGR, no LE edema  GI: S, NT, ND, +BS  Skin/Integumen: warm/dry, laceration on forehead healing well   Other: suh draining clear yellow urine    Medications       cefuroxime  500 mg Oral Q12H [facility] (08/20)     ferrous sulfate  325 mg Oral Daily     insulin aspart  1-7 Units Subcutaneous TID AC     insulin aspart  1-5 Units Subcutaneous At Bedtime     insulin glargine  25 Units Subcutaneous QAM AC     sodium chloride (PF)  3 mL Intracatheter Q8H     tamsulosin  0.4 mg Oral Daily       Data   Recent Labs   Lab 01/18/23  0734 01/18/23  0143 01/17/23  2102 01/17/23  0645 01/17/23  0554 01/16/23  1229 01/16/23  0700 01/15/23  1201 01/15/23  0700 01/12/23  0037 01/11/23  2100   WBC  --   --   --   --  13.9*  --  15.9*  --  17.8*   < > 23.6*   HGB  --   --   --   --  9.6*  --  9.4*  --  9.6*   < > 12.1*   MCV  --   --   --   --  84  --  83  --  84   < > 83   PLT  --   --   --   --  403  --  430  --  432   < > 596*   NA  --   --   --   --  136  --  135   --  131*   < > 122*   POTASSIUM  --   --   --   --  3.6  --  3.8  --  3.9   < > 5.5*   CHLORIDE  --   --   --   --  101  --  102  --  102   < > 90*   CO2  --   --   --   --  28  --  27  --  22   < > 17*   BUN  --   --   --   --  40*  --  42*  --  52*   < > 133*   CR  --   --   --   --  1.19  --  1.21  --  1.41*   < > 3.86*   ANIONGAP  --   --   --   --  7  --  6  --  7   < > 15*   MARNI  --   --   --   --  8.9  --  8.8  --  8.4*   < > 9.3   * 213* 305*   < > 138*   < > 180*   < > 205*   < > 202*   ALBUMIN  --   --   --   --   --   --   --   --   --   --  2.3*   PROTTOTAL  --   --   --   --   --   --   --   --   --   --  8.8   BILITOTAL  --   --   --   --   --   --   --   --   --   --  1.1   ALKPHOS  --   --   --   --   --   --   --   --   --   --  187*   ALT  --   --   --   --   --   --   --   --   --   --  74*   AST  --   --   --   --   --   --   --   --   --   --  61*   LIPASE  --   --   --   --   --   --   --   --   --   --  64*    < > = values in this interval not displayed.       No results found for this or any previous visit (from the past 24 hour(s)).

## 2023-01-18 NOTE — PLAN OF CARE
Goal Outcome Evaluation:  A&O, VSS on RA, denies pain, mod carb diet, tolerating, will discharge with suh, 1 BM this shift, sacral dressing change, BG checks, discharge via WC ride at 5 pm.

## 2023-01-19 NOTE — PLAN OF CARE
Physical Therapy Discharge Summary    Reason for therapy discharge:    Discharged to transitional care facility.    Progress towards therapy goal(s). See goals on Care Plan in [facility] electronic health record for goal details.  Goals partially met.  Barriers to achieving goals:   discharge from facility.    Therapy recommendation(s):    Continued therapy is recommended.  Rationale/Recommendations:  Pt is below baseline, currently Ax1 for mobility. Pt has 7 stairs to enter his house and reports his wife cannot provide much assist. Recommend TCU to increase strength and functional mobility.

## 2023-01-19 NOTE — PROGRESS NOTES
Occupational Therapy Discharge Summary    Reason for therapy discharge:    Discharged to transitional care facility.    Progress towards therapy goal(s). See goals on Care Plan in [facility] electronic health record for goal details.  Goals partially met.  Barriers to achieving goals:   discharge from facility.    Therapy recommendation(s):    Continued therapy is recommended.  Rationale/Recommendations:  below baseline function in ADL. recommend TCU to increase function and safety .

## 2023-01-22 ENCOUNTER — LAB REQUISITION (OUTPATIENT)
Dept: LAB | Facility: CLINIC | Age: 73
End: 2023-01-22
Payer: COMMERCIAL

## 2023-01-22 DIAGNOSIS — N18.9 CHRONIC KIDNEY DISEASE, UNSPECIFIED: ICD-10-CM

## 2023-01-22 DIAGNOSIS — D64.9 ANEMIA, UNSPECIFIED: ICD-10-CM

## 2023-01-24 ENCOUNTER — LAB REQUISITION (OUTPATIENT)
Dept: LAB | Facility: CLINIC | Age: 73
End: 2023-01-24
Payer: COMMERCIAL

## 2023-01-24 DIAGNOSIS — M62.81 MUSCLE WEAKNESS (GENERALIZED): ICD-10-CM

## 2023-01-24 DIAGNOSIS — D64.9 ANEMIA, UNSPECIFIED: ICD-10-CM

## 2023-01-24 LAB
ANION GAP SERPL CALCULATED.3IONS-SCNC: 12 MMOL/L (ref 7–15)
BUN SERPL-MCNC: 36.3 MG/DL (ref 8–23)
CALCIUM SERPL-MCNC: 9.1 MG/DL (ref 8.8–10.2)
CHLORIDE SERPL-SCNC: 98 MMOL/L (ref 98–107)
CREAT SERPL-MCNC: 1.2 MG/DL (ref 0.67–1.17)
DEPRECATED HCO3 PLAS-SCNC: 26 MMOL/L (ref 22–29)
ERYTHROCYTE [DISTWIDTH] IN BLOOD BY AUTOMATED COUNT: 15.9 % (ref 10–15)
GFR SERPL CREATININE-BSD FRML MDRD: 64 ML/MIN/1.73M2
GLUCOSE SERPL-MCNC: 73 MG/DL (ref 70–99)
HCT VFR BLD AUTO: 28.7 % (ref 40–53)
HGB BLD-MCNC: 8.9 G/DL (ref 13.3–17.7)
MCH RBC QN AUTO: 27.1 PG (ref 26.5–33)
MCHC RBC AUTO-ENTMCNC: 31 G/DL (ref 31.5–36.5)
MCV RBC AUTO: 87 FL (ref 78–100)
PLATELET # BLD AUTO: 371 10E3/UL (ref 150–450)
POTASSIUM SERPL-SCNC: 3.9 MMOL/L (ref 3.4–5.3)
RBC # BLD AUTO: 3.29 10E6/UL (ref 4.4–5.9)
SODIUM SERPL-SCNC: 136 MMOL/L (ref 136–145)
WBC # BLD AUTO: 12.8 10E3/UL (ref 4–11)

## 2023-01-24 PROCEDURE — 80048 BASIC METABOLIC PNL TOTAL CA: CPT | Mod: ORL | Performed by: INTERNAL MEDICINE

## 2023-01-24 PROCEDURE — 36415 COLL VENOUS BLD VENIPUNCTURE: CPT | Mod: ORL | Performed by: INTERNAL MEDICINE

## 2023-01-24 PROCEDURE — P9604 ONE-WAY ALLOW PRORATED TRIP: HCPCS | Mod: ORL | Performed by: INTERNAL MEDICINE

## 2023-01-24 PROCEDURE — 85027 COMPLETE CBC AUTOMATED: CPT | Mod: ORL | Performed by: INTERNAL MEDICINE

## 2023-01-25 LAB
FOLATE SERPL-MCNC: 10.5 NG/ML (ref 4.6–34.8)
TSH SERPL DL<=0.005 MIU/L-ACNC: 14.8 UIU/ML (ref 0.3–4.2)

## 2023-01-25 PROCEDURE — 84443 ASSAY THYROID STIM HORMONE: CPT | Mod: ORL | Performed by: NURSE PRACTITIONER

## 2023-01-25 PROCEDURE — 82746 ASSAY OF FOLIC ACID SERUM: CPT | Mod: ORL | Performed by: NURSE PRACTITIONER

## 2023-01-25 PROCEDURE — P9603 ONE-WAY ALLOW PRORATED MILES: HCPCS | Mod: ORL | Performed by: NURSE PRACTITIONER

## 2023-01-25 PROCEDURE — 36415 COLL VENOUS BLD VENIPUNCTURE: CPT | Mod: ORL | Performed by: NURSE PRACTITIONER

## 2023-01-27 ENCOUNTER — LAB REQUISITION (OUTPATIENT)
Dept: LAB | Facility: CLINIC | Age: 73
End: 2023-01-27
Payer: COMMERCIAL

## 2023-01-27 DIAGNOSIS — D64.9 ANEMIA, UNSPECIFIED: ICD-10-CM

## 2023-01-30 LAB
ERYTHROCYTE [DISTWIDTH] IN BLOOD BY AUTOMATED COUNT: 16.4 % (ref 10–15)
HCT VFR BLD AUTO: 32.9 % (ref 40–53)
HGB BLD-MCNC: 10 G/DL (ref 13.3–17.7)
MCH RBC QN AUTO: 27.4 PG (ref 26.5–33)
MCHC RBC AUTO-ENTMCNC: 30.4 G/DL (ref 31.5–36.5)
MCV RBC AUTO: 90 FL (ref 78–100)
PLATELET # BLD AUTO: 473 10E3/UL (ref 150–450)
RBC # BLD AUTO: 3.65 10E6/UL (ref 4.4–5.9)
T4 FREE SERPL-MCNC: 1.09 NG/DL (ref 0.9–1.7)
TSH SERPL DL<=0.005 MIU/L-ACNC: 14.8 UIU/ML (ref 0.3–4.2)
WBC # BLD AUTO: 14.4 10E3/UL (ref 4–11)

## 2023-01-30 PROCEDURE — 36415 COLL VENOUS BLD VENIPUNCTURE: CPT | Mod: ORL | Performed by: INTERNAL MEDICINE

## 2023-01-30 PROCEDURE — P9604 ONE-WAY ALLOW PRORATED TRIP: HCPCS | Mod: ORL | Performed by: INTERNAL MEDICINE

## 2023-01-30 PROCEDURE — 84439 ASSAY OF FREE THYROXINE: CPT | Mod: ORL | Performed by: INTERNAL MEDICINE

## 2023-01-30 PROCEDURE — 85027 COMPLETE CBC AUTOMATED: CPT | Mod: ORL | Performed by: INTERNAL MEDICINE

## 2023-01-30 PROCEDURE — 84443 ASSAY THYROID STIM HORMONE: CPT | Mod: ORL | Performed by: INTERNAL MEDICINE

## 2023-02-03 ENCOUNTER — HOSPITAL ENCOUNTER (INPATIENT)
Facility: CLINIC | Age: 73
LOS: 6 days | Discharge: NURSING, REHAB OR LONG-TERM CARE | DRG: 380 | End: 2023-02-09
Attending: EMERGENCY MEDICINE | Admitting: HOSPITALIST
Payer: COMMERCIAL

## 2023-02-03 ENCOUNTER — APPOINTMENT (OUTPATIENT)
Dept: CT IMAGING | Facility: CLINIC | Age: 73
DRG: 380 | End: 2023-02-03
Attending: EMERGENCY MEDICINE
Payer: COMMERCIAL

## 2023-02-03 ENCOUNTER — APPOINTMENT (OUTPATIENT)
Dept: ULTRASOUND IMAGING | Facility: CLINIC | Age: 73
DRG: 380 | End: 2023-02-03
Attending: HOSPITALIST
Payer: COMMERCIAL

## 2023-02-03 ENCOUNTER — APPOINTMENT (OUTPATIENT)
Dept: GENERAL RADIOLOGY | Facility: CLINIC | Age: 73
DRG: 380 | End: 2023-02-03
Attending: EMERGENCY MEDICINE
Payer: COMMERCIAL

## 2023-02-03 DIAGNOSIS — J96.01 ACUTE RESPIRATORY FAILURE WITH HYPOXIA (H): ICD-10-CM

## 2023-02-03 DIAGNOSIS — D62 ANEMIA DUE TO BLOOD LOSS, ACUTE: Primary | ICD-10-CM

## 2023-02-03 DIAGNOSIS — E87.1 HYPONATREMIA: ICD-10-CM

## 2023-02-03 DIAGNOSIS — E87.6 HYPOKALEMIA: ICD-10-CM

## 2023-02-03 DIAGNOSIS — M62.81 GENERALIZED MUSCLE WEAKNESS: ICD-10-CM

## 2023-02-03 DIAGNOSIS — E11.9 TYPE 2 DIABETES MELLITUS WITHOUT COMPLICATION, WITH LONG-TERM CURRENT USE OF INSULIN (H): ICD-10-CM

## 2023-02-03 DIAGNOSIS — Z79.4 TYPE 2 DIABETES MELLITUS WITHOUT COMPLICATION, WITH LONG-TERM CURRENT USE OF INSULIN (H): ICD-10-CM

## 2023-02-03 DIAGNOSIS — J18.9 COMMUNITY ACQUIRED PNEUMONIA, UNSPECIFIED LATERALITY: ICD-10-CM

## 2023-02-03 DIAGNOSIS — N30.00 ACUTE CYSTITIS WITHOUT HEMATURIA: ICD-10-CM

## 2023-02-03 LAB
ALBUMIN SERPL BCG-MCNC: 3 G/DL (ref 3.5–5.2)
ALBUMIN UR-MCNC: 70 MG/DL
ALP SERPL-CCNC: 90 U/L (ref 40–129)
ALT SERPL W P-5'-P-CCNC: 15 U/L (ref 10–50)
ANION GAP SERPL CALCULATED.3IONS-SCNC: 9 MMOL/L (ref 7–15)
APPEARANCE UR: ABNORMAL
AST SERPL W P-5'-P-CCNC: 23 U/L (ref 10–50)
BASOPHILS # BLD AUTO: 0 10E3/UL (ref 0–0.2)
BASOPHILS NFR BLD AUTO: 0 %
BILIRUB SERPL-MCNC: 0.3 MG/DL
BILIRUB UR QL STRIP: NEGATIVE
BUN SERPL-MCNC: 30.6 MG/DL (ref 8–23)
CALCIUM SERPL-MCNC: 8.9 MG/DL (ref 8.8–10.2)
CHLORIDE SERPL-SCNC: 90 MMOL/L (ref 98–107)
COLOR UR AUTO: ABNORMAL
CREAT SERPL-MCNC: 1.14 MG/DL (ref 0.67–1.17)
CREAT UR-MCNC: 75.3 MG/DL
D DIMER PPP FEU-MCNC: 8.09 UG/ML FEU (ref 0–0.5)
DEPRECATED HCO3 PLAS-SCNC: 25 MMOL/L (ref 22–29)
EOSINOPHIL # BLD AUTO: 0 10E3/UL (ref 0–0.7)
EOSINOPHIL NFR BLD AUTO: 0 %
ERYTHROCYTE [DISTWIDTH] IN BLOOD BY AUTOMATED COUNT: 15.7 % (ref 10–15)
FLUAV RNA SPEC QL NAA+PROBE: NEGATIVE
FLUBV RNA RESP QL NAA+PROBE: NEGATIVE
GFR SERPL CREATININE-BSD FRML MDRD: 68 ML/MIN/1.73M2
GLUCOSE BLDC GLUCOMTR-MCNC: 130 MG/DL (ref 70–99)
GLUCOSE BLDC GLUCOMTR-MCNC: 164 MG/DL (ref 70–99)
GLUCOSE SERPL-MCNC: 182 MG/DL (ref 70–99)
GLUCOSE UR STRIP-MCNC: 30 MG/DL
HBA1C MFR BLD: 6.6 %
HCT VFR BLD AUTO: 30.4 % (ref 40–53)
HGB BLD-MCNC: 10 G/DL (ref 13.3–17.7)
HGB UR QL STRIP: ABNORMAL
HOLD SPECIMEN: NORMAL
IMM GRANULOCYTES # BLD: 0.3 10E3/UL
IMM GRANULOCYTES NFR BLD: 2 %
IRON BINDING CAPACITY (ROCHE): 184 UG/DL (ref 240–430)
IRON SATN MFR SERPL: 20 % (ref 15–46)
IRON SERPL-MCNC: 36 UG/DL (ref 61–157)
KETONES UR STRIP-MCNC: NEGATIVE MG/DL
LACTATE SERPL-SCNC: 1.6 MMOL/L (ref 0.7–2)
LEUKOCYTE ESTERASE UR QL STRIP: ABNORMAL
LIPASE SERPL-CCNC: 13 U/L (ref 13–60)
LYMPHOCYTES # BLD AUTO: 3.5 10E3/UL (ref 0.8–5.3)
LYMPHOCYTES NFR BLD AUTO: 20 %
MCH RBC QN AUTO: 27.9 PG (ref 26.5–33)
MCHC RBC AUTO-ENTMCNC: 32.9 G/DL (ref 31.5–36.5)
MCV RBC AUTO: 85 FL (ref 78–100)
MONOCYTES # BLD AUTO: 0.9 10E3/UL (ref 0–1.3)
MONOCYTES NFR BLD AUTO: 5 %
MUCOUS THREADS #/AREA URNS LPF: PRESENT /LPF
NEUTROPHILS # BLD AUTO: 12.6 10E3/UL (ref 1.6–8.3)
NEUTROPHILS NFR BLD AUTO: 73 %
NITRATE UR QL: NEGATIVE
NRBC # BLD AUTO: 0 10E3/UL
NRBC BLD AUTO-RTO: 0 /100
NT-PROBNP SERPL-MCNC: 289 PG/ML (ref 0–900)
PH UR STRIP: 5.5 [PH] (ref 5–7)
PLATELET # BLD AUTO: 556 10E3/UL (ref 150–450)
POTASSIUM SERPL-SCNC: 4 MMOL/L (ref 3.4–5.3)
PROCALCITONIN SERPL IA-MCNC: 0.16 NG/ML
PROT SERPL-MCNC: 7 G/DL (ref 6.4–8.3)
RBC # BLD AUTO: 3.58 10E6/UL (ref 4.4–5.9)
RBC URINE: 28 /HPF
RSV RNA SPEC NAA+PROBE: NEGATIVE
SARS-COV-2 RNA RESP QL NAA+PROBE: NEGATIVE
SODIUM SERPL-SCNC: 123 MMOL/L (ref 136–145)
SODIUM SERPL-SCNC: 124 MMOL/L (ref 136–145)
SODIUM UR-SCNC: <20 MMOL/L
SP GR UR STRIP: 1.01 (ref 1–1.03)
TROPONIN T SERPL HS-MCNC: 84 NG/L
TROPONIN T SERPL HS-MCNC: 88 NG/L
UROBILINOGEN UR STRIP-MCNC: NORMAL MG/DL
WBC # BLD AUTO: 17.4 10E3/UL (ref 4–11)
WBC URINE: >182 /HPF

## 2023-02-03 PROCEDURE — 83036 HEMOGLOBIN GLYCOSYLATED A1C: CPT | Performed by: HOSPITALIST

## 2023-02-03 PROCEDURE — 83605 ASSAY OF LACTIC ACID: CPT | Performed by: HOSPITALIST

## 2023-02-03 PROCEDURE — 36415 COLL VENOUS BLD VENIPUNCTURE: CPT | Performed by: EMERGENCY MEDICINE

## 2023-02-03 PROCEDURE — 258N000003 HC RX IP 258 OP 636: Performed by: HOSPITALIST

## 2023-02-03 PROCEDURE — 85018 HEMOGLOBIN: CPT | Performed by: HOSPITALIST

## 2023-02-03 PROCEDURE — 84295 ASSAY OF SERUM SODIUM: CPT | Performed by: HOSPITALIST

## 2023-02-03 PROCEDURE — 250N000013 HC RX MED GY IP 250 OP 250 PS 637: Performed by: HOSPITALIST

## 2023-02-03 PROCEDURE — 93005 ELECTROCARDIOGRAM TRACING: CPT

## 2023-02-03 PROCEDURE — 83550 IRON BINDING TEST: CPT | Performed by: HOSPITALIST

## 2023-02-03 PROCEDURE — 120N000001 HC R&B MED SURG/OB

## 2023-02-03 PROCEDURE — 250N000009 HC RX 250: Performed by: EMERGENCY MEDICINE

## 2023-02-03 PROCEDURE — 96374 THER/PROPH/DIAG INJ IV PUSH: CPT | Mod: 59

## 2023-02-03 PROCEDURE — 83690 ASSAY OF LIPASE: CPT | Performed by: EMERGENCY MEDICINE

## 2023-02-03 PROCEDURE — 84145 PROCALCITONIN (PCT): CPT | Performed by: HOSPITALIST

## 2023-02-03 PROCEDURE — C9113 INJ PANTOPRAZOLE SODIUM, VIA: HCPCS | Performed by: HOSPITALIST

## 2023-02-03 PROCEDURE — 83880 ASSAY OF NATRIURETIC PEPTIDE: CPT | Performed by: HOSPITALIST

## 2023-02-03 PROCEDURE — 99223 1ST HOSP IP/OBS HIGH 75: CPT | Mod: AI | Performed by: HOSPITALIST

## 2023-02-03 PROCEDURE — 81001 URINALYSIS AUTO W/SCOPE: CPT | Performed by: EMERGENCY MEDICINE

## 2023-02-03 PROCEDURE — 0T2BX0Z CHANGE DRAINAGE DEVICE IN BLADDER, EXTERNAL APPROACH: ICD-10-PCS | Performed by: EMERGENCY MEDICINE

## 2023-02-03 PROCEDURE — 99285 EMERGENCY DEPT VISIT HI MDM: CPT | Mod: 25

## 2023-02-03 PROCEDURE — 80053 COMPREHEN METABOLIC PANEL: CPT | Performed by: EMERGENCY MEDICINE

## 2023-02-03 PROCEDURE — 96375 TX/PRO/DX INJ NEW DRUG ADDON: CPT

## 2023-02-03 PROCEDURE — 36415 COLL VENOUS BLD VENIPUNCTURE: CPT | Performed by: HOSPITALIST

## 2023-02-03 PROCEDURE — 250N000011 HC RX IP 250 OP 636: Performed by: HOSPITALIST

## 2023-02-03 PROCEDURE — 71275 CT ANGIOGRAPHY CHEST: CPT

## 2023-02-03 PROCEDURE — 85025 COMPLETE CBC W/AUTO DIFF WBC: CPT | Performed by: EMERGENCY MEDICINE

## 2023-02-03 PROCEDURE — 87040 BLOOD CULTURE FOR BACTERIA: CPT | Performed by: HOSPITALIST

## 2023-02-03 PROCEDURE — 250N000011 HC RX IP 250 OP 636: Performed by: EMERGENCY MEDICINE

## 2023-02-03 PROCEDURE — 87637 SARSCOV2&INF A&B&RSV AMP PRB: CPT | Performed by: EMERGENCY MEDICINE

## 2023-02-03 PROCEDURE — 85379 FIBRIN DEGRADATION QUANT: CPT | Performed by: EMERGENCY MEDICINE

## 2023-02-03 PROCEDURE — 82570 ASSAY OF URINE CREATININE: CPT | Performed by: EMERGENCY MEDICINE

## 2023-02-03 PROCEDURE — 87086 URINE CULTURE/COLONY COUNT: CPT | Performed by: EMERGENCY MEDICINE

## 2023-02-03 PROCEDURE — 71046 X-RAY EXAM CHEST 2 VIEWS: CPT

## 2023-02-03 PROCEDURE — 84300 ASSAY OF URINE SODIUM: CPT | Performed by: EMERGENCY MEDICINE

## 2023-02-03 PROCEDURE — 84484 ASSAY OF TROPONIN QUANT: CPT | Performed by: HOSPITALIST

## 2023-02-03 PROCEDURE — 93970 EXTREMITY STUDY: CPT

## 2023-02-03 PROCEDURE — C9803 HOPD COVID-19 SPEC COLLECT: HCPCS

## 2023-02-03 RX ORDER — IOPAMIDOL 755 MG/ML
55 INJECTION, SOLUTION INTRAVASCULAR ONCE
Status: COMPLETED | OUTPATIENT
Start: 2023-02-03 | End: 2023-02-03

## 2023-02-03 RX ORDER — POLYETHYLENE GLYCOL 3350 17 G/17G
17 POWDER, FOR SOLUTION ORAL DAILY
Status: DISCONTINUED | OUTPATIENT
Start: 2023-02-04 | End: 2023-02-09 | Stop reason: HOSPADM

## 2023-02-03 RX ORDER — AZITHROMYCIN 500 MG/1
500 INJECTION, POWDER, LYOPHILIZED, FOR SOLUTION INTRAVENOUS ONCE
Status: COMPLETED | OUTPATIENT
Start: 2023-02-03 | End: 2023-02-03

## 2023-02-03 RX ORDER — ACETAMINOPHEN 325 MG/1
650 TABLET ORAL EVERY 6 HOURS PRN
Status: DISCONTINUED | OUTPATIENT
Start: 2023-02-03 | End: 2023-02-09 | Stop reason: HOSPADM

## 2023-02-03 RX ORDER — AMOXICILLIN 250 MG
1 CAPSULE ORAL 2 TIMES DAILY
COMMUNITY

## 2023-02-03 RX ORDER — LEVOTHYROXINE SODIUM 50 UG/1
50 TABLET ORAL DAILY
COMMUNITY

## 2023-02-03 RX ORDER — NICOTINE POLACRILEX 4 MG
15-30 LOZENGE BUCCAL
Status: DISCONTINUED | OUTPATIENT
Start: 2023-02-03 | End: 2023-02-09 | Stop reason: HOSPADM

## 2023-02-03 RX ORDER — POLYETHYLENE GLYCOL 3350 17 G/17G
1 POWDER, FOR SOLUTION ORAL DAILY
COMMUNITY

## 2023-02-03 RX ORDER — ACETAMINOPHEN 650 MG/1
650 SUPPOSITORY RECTAL EVERY 6 HOURS PRN
Status: DISCONTINUED | OUTPATIENT
Start: 2023-02-03 | End: 2023-02-09 | Stop reason: HOSPADM

## 2023-02-03 RX ORDER — LEVOTHYROXINE SODIUM 50 UG/1
50 TABLET ORAL
Status: DISCONTINUED | OUTPATIENT
Start: 2023-02-04 | End: 2023-02-09 | Stop reason: HOSPADM

## 2023-02-03 RX ORDER — ONDANSETRON 2 MG/ML
4 INJECTION INTRAMUSCULAR; INTRAVENOUS EVERY 6 HOURS PRN
Status: DISCONTINUED | OUTPATIENT
Start: 2023-02-03 | End: 2023-02-09 | Stop reason: HOSPADM

## 2023-02-03 RX ORDER — SODIUM CHLORIDE 9 MG/ML
INJECTION, SOLUTION INTRAVENOUS CONTINUOUS
Status: DISCONTINUED | OUTPATIENT
Start: 2023-02-03 | End: 2023-02-04

## 2023-02-03 RX ORDER — LIDOCAINE 40 MG/G
CREAM TOPICAL
Status: DISCONTINUED | OUTPATIENT
Start: 2023-02-03 | End: 2023-02-07

## 2023-02-03 RX ORDER — AMOXICILLIN 250 MG
2 CAPSULE ORAL 2 TIMES DAILY
Status: DISCONTINUED | OUTPATIENT
Start: 2023-02-03 | End: 2023-02-09 | Stop reason: HOSPADM

## 2023-02-03 RX ORDER — CEFTRIAXONE 2 G/1
2 INJECTION, POWDER, FOR SOLUTION INTRAMUSCULAR; INTRAVENOUS ONCE
Status: COMPLETED | OUTPATIENT
Start: 2023-02-03 | End: 2023-02-03

## 2023-02-03 RX ORDER — ASPIRIN 81 MG/1
81 TABLET ORAL DAILY
Status: DISCONTINUED | OUTPATIENT
Start: 2023-02-03 | End: 2023-02-09 | Stop reason: HOSPADM

## 2023-02-03 RX ORDER — ONDANSETRON 4 MG/1
4 TABLET, ORALLY DISINTEGRATING ORAL EVERY 6 HOURS PRN
Status: DISCONTINUED | OUTPATIENT
Start: 2023-02-03 | End: 2023-02-09 | Stop reason: HOSPADM

## 2023-02-03 RX ORDER — HEPARIN SODIUM 5000 [USP'U]/.5ML
5000 INJECTION, SOLUTION INTRAVENOUS; SUBCUTANEOUS 2 TIMES DAILY
Status: DISCONTINUED | OUTPATIENT
Start: 2023-02-03 | End: 2023-02-09 | Stop reason: HOSPADM

## 2023-02-03 RX ORDER — LIDOCAINE HYDROCHLORIDE 20 MG/ML
10 JELLY TOPICAL ONCE
Status: COMPLETED | OUTPATIENT
Start: 2023-02-03 | End: 2023-02-03

## 2023-02-03 RX ORDER — TAMSULOSIN HYDROCHLORIDE 0.4 MG/1
0.4 CAPSULE ORAL DAILY
Status: DISCONTINUED | OUTPATIENT
Start: 2023-02-04 | End: 2023-02-09 | Stop reason: HOSPADM

## 2023-02-03 RX ORDER — PIPERACILLIN SODIUM, TAZOBACTAM SODIUM 3; .375 G/15ML; G/15ML
3.38 INJECTION, POWDER, LYOPHILIZED, FOR SOLUTION INTRAVENOUS EVERY 6 HOURS
Status: DISCONTINUED | OUTPATIENT
Start: 2023-02-03 | End: 2023-02-05

## 2023-02-03 RX ORDER — BISACODYL 10 MG
10 SUPPOSITORY, RECTAL RECTAL DAILY PRN
Status: DISCONTINUED | OUTPATIENT
Start: 2023-02-03 | End: 2023-02-09 | Stop reason: HOSPADM

## 2023-02-03 RX ORDER — DEXTROSE MONOHYDRATE 25 G/50ML
25-50 INJECTION, SOLUTION INTRAVENOUS
Status: DISCONTINUED | OUTPATIENT
Start: 2023-02-03 | End: 2023-02-09 | Stop reason: HOSPADM

## 2023-02-03 RX ADMIN — SODIUM CHLORIDE: 9 INJECTION, SOLUTION INTRAVENOUS at 17:02

## 2023-02-03 RX ADMIN — SENNOSIDES AND DOCUSATE SODIUM 2 TABLET: 50; 8.6 TABLET ORAL at 20:34

## 2023-02-03 RX ADMIN — HEPARIN SODIUM 5000 UNITS: 5000 INJECTION, SOLUTION INTRAVENOUS; SUBCUTANEOUS at 18:20

## 2023-02-03 RX ADMIN — AZITHROMYCIN MONOHYDRATE 500 MG: 500 INJECTION, POWDER, LYOPHILIZED, FOR SOLUTION INTRAVENOUS at 16:27

## 2023-02-03 RX ADMIN — ASPIRIN 81 MG: 81 TABLET, COATED ORAL at 18:24

## 2023-02-03 RX ADMIN — SODIUM CHLORIDE: 9 INJECTION, SOLUTION INTRAVENOUS at 18:11

## 2023-02-03 RX ADMIN — PIPERACILLIN AND TAZOBACTAM 3.38 G: 3; .375 INJECTION, POWDER, FOR SOLUTION INTRAVENOUS at 22:52

## 2023-02-03 RX ADMIN — SODIUM CHLORIDE 82 ML: 900 INJECTION INTRAVENOUS at 13:32

## 2023-02-03 RX ADMIN — CEFTRIAXONE SODIUM 2 G: 2 INJECTION, POWDER, FOR SOLUTION INTRAMUSCULAR; INTRAVENOUS at 15:54

## 2023-02-03 RX ADMIN — IOPAMIDOL 55 ML: 755 INJECTION, SOLUTION INTRAVENOUS at 13:32

## 2023-02-03 RX ADMIN — PANTOPRAZOLE SODIUM 40 MG: 40 INJECTION, POWDER, FOR SOLUTION INTRAVENOUS at 18:12

## 2023-02-03 RX ADMIN — PIPERACILLIN AND TAZOBACTAM 3.38 G: 3; .375 INJECTION, POWDER, FOR SOLUTION INTRAVENOUS at 18:14

## 2023-02-03 ASSESSMENT — ACTIVITIES OF DAILY LIVING (ADL)
ADLS_ACUITY_SCORE: 35
ADLS_ACUITY_SCORE: 37
ADLS_ACUITY_SCORE: 35
ADLS_ACUITY_SCORE: 35
ADLS_ACUITY_SCORE: 37

## 2023-02-03 NOTE — ED PROVIDER NOTES
"  History     Chief Complaint:  GI Bleeding       HPI   [place] is a 72 year old male with a history of recurrent urinary tract infection, iron deficiency anemia, and cognitive difficulties presents to us after dark-colored emesis this morning.  He was recently admitted to the hospital for a week in the early part of January for urinary retention, UTI, and encephalopathy.  He has been doing fairly well at his current TCU.  He has been placed on iron supplementation for his anemia.  He notes that they give him multiple pills first thing in the morning on an empty stomach.  He then typically eats shortly thereafter.  He does not like the way the iron makes him feel, causing him to have stomach upset and burping.  He also notes that his stools have been dark since starting the iron therapy.    The patient states that he was feeling well this morning.  They gave him all of his medications including his iron supplement.  Approximately 30 minutes later, he felt nauseated and had an episode of vomiting which was dark in color.  He feels better after throwing up.  He describes generalized \"acid feeling\" throughout his abdomen but denies any focal pain.  He denies fevers or chills.  He denies vomiting yesterday.  He otherwise denies other complaints at this juncture.      Independent Historian:   None - Patient Only    Review of External Notes: Yes-I reviewed his hospitalization from January 11 through January 19 where he was admitted for urinary retention, UTI, and delirium.    ROS:  Review of Systems  Pertinent positives and negatives as above.  A 14-point review of systems was performed with all other systems reviewed as negative.    Allergies:  No Known Allergies     Medications:    No current outpatient medications on file.      Past Medical History:    Past Medical History:   Diagnosis Date     Anemia, iron deficiency      Other and unspecified hyperlipidemia      Shingles 2013     Type II or unspecified type " "diabetes mellitus without mention of complication, not stated as uncontrolled        Past Surgical History:    Past Surgical History:   Procedure Laterality Date     CATARACT IOL, RT/LT  1997    bilaterally        Family History:    family history includes Alzheimer Disease in his mother; Cancer in his maternal grandfather, maternal grandmother, and paternal grandmother; Cancer - colorectal in his father; Diabetes in his father; Eye Disorder in his mother; Lung Cancer in his father; Unknown/Adopted in his paternal grandfather.    Social History:   reports that he has quit smoking. His smoking use included cigarettes. He has never used smokeless tobacco. He reports that he does not currently use alcohol. He reports that he does not use drugs.  PCP: Mallory Davila     Physical Exam     Patient Vitals for the past 24 hrs:   BP Temp Temp src Pulse Resp SpO2 Height Weight   02/03/23 1942 99/54 98.7  F (37.1  C) Oral 98 16 -- -- --   02/03/23 1820 (!) 88/52 -- -- 100 -- -- -- --   02/03/23 1730 (!) 88/55 98.7  F (37.1  C) Oral 106 16 96 % -- --   02/03/23 1630 105/58 -- -- 107 -- 97 % -- --   02/03/23 1602 101/56 -- -- 107 -- 97 % -- --   02/03/23 1547 97/61 -- -- -- -- 96 % -- --   02/03/23 1245 -- -- -- -- -- 95 % -- --   02/03/23 1230 -- -- -- -- -- 94 % -- --   02/03/23 1215 -- -- -- -- -- 96 % -- --   02/03/23 0910 115/66 97.8  F (36.6  C) Oral 104 16 (!) 88 % 1.702 m (5' 7\") 52 kg (114 lb 10.2 oz)        Physical Exam  Eye:  Pupils are equal, round, and reactive.  Extraocular movements intact.    ENT:  No rhinorrhea.  Moist mucus membranes.  Normal tongue and tonsil.    Cardiac:  Regular rate and rhythm.  No murmurs, gallops, or rubs.    Pulmonary:  Clear to auscultation bilaterally.  No wheezes, rales, or rhonchi.    Abdomen:  Positive bowel sounds.  Abdomen is soft and non-distended, without focal tenderness.    Musculoskeletal:  Normal movement of all extremities without evidence for deficit.    Skin:  Warm and " dry without rashes.    Neurologic:  Non-focal exam without asymmetric weakness or numbness.     Psychiatric:  Normal affect with appropriate interaction with examiner.  The patient is not the best historian, though this appears to be at his baseline.      Emergency Department Course   [unfilled]    Imaging:  US Lower Extremity Venous Duplex Bilateral   Final Result   IMPRESSION:   1.  No deep venous thrombosis in the bilateral lower extremities.      CT Chest Pulmonary Embolism w Contrast   Final Result   IMPRESSION:   1.  No evidence for pulmonary embolism.   2.  Patchy predominantly groundglass opacities in both mid and lower   lungs are most likely infectious or inflammatory in etiology.   3.  Trace bilateral pleural effusions.   4.  Moderate to large amount of gas and stool in the visualized   portion of the colon suggests constipation.      BYRON FRENCH MD            SYSTEM ID:  [id number]      Chest XR,  PA & LAT   Final Result   IMPRESSION: No focal infiltrate, pleural effusion or pneumothorax.   Normal heart size.      ATIF JUÁREZ MD            SYSTEM ID:  [id number]      Echocardiogram Complete    (Results Pending)      Report per radiology    Laboratory:  Labs Ordered and Resulted from Time of ED Arrival to Time of ED Departure   COMPREHENSIVE METABOLIC PANEL - Abnormal       Result Value    Sodium 124 (*)     Potassium 4.0      Chloride 90 (*)     Carbon Dioxide (CO2) 25      Anion Gap 9      Urea Nitrogen 30.6 (*)     Creatinine 1.14      Calcium 8.9      Glucose 182 (*)     Alkaline Phosphatase 90      AST 23      ALT 15      Protein Total 7.0      Albumin 3.0 (*)     Bilirubin Total 0.3      GFR Estimate 68     CBC WITH PLATELETS AND DIFFERENTIAL - Abnormal    WBC Count 17.4 (*)     RBC Count 3.58 (*)     Hemoglobin 10.0 (*)     Hematocrit 30.4 (*)     MCV 85      MCH 27.9      MCHC 32.9      RDW 15.7 (*)     Platelet Count 556 (*)     % Neutrophils 73      % Lymphocytes 20      % Monocytes 5      %  Eosinophils 0      % Basophils 0      % Immature Granulocytes 2      NRBCs per 100 WBC 0      Absolute Neutrophils 12.6 (*)     Absolute Lymphocytes 3.5      Absolute Monocytes 0.9      Absolute Eosinophils 0.0      Absolute Basophils 0.0      Absolute Immature Granulocytes 0.3      Absolute NRBCs 0.0     ROUTINE UA WITH MICROSCOPIC REFLEX TO CULTURE - Abnormal    Color Urine Light Yellow      Appearance Urine Slightly Cloudy (*)     Glucose Urine 30 (*)     Bilirubin Urine Negative      Ketones Urine Negative      Specific Gravity Urine 1.015      Blood Urine Moderate (*)     pH Urine 5.5      Protein Albumin Urine 70 (*)     Urobilinogen Urine Normal      Nitrite Urine Negative      Leukocyte Esterase Urine Large (*)     Mucus Urine Present (*)     RBC Urine 28 (*)     WBC Urine >182 (*)    D DIMER QUANTITATIVE - Abnormal    D-Dimer Quantitative 8.09 (*)    PROCALCITONIN - Abnormal    Procalcitonin 0.16 (*)    TROPONIN T, HIGH SENSITIVITY - Abnormal    Troponin T, High Sensitivity 84 (*)    TROPONIN T, HIGH SENSITIVITY - Abnormal    Troponin T, High Sensitivity 88 (*)    SODIUM - Abnormal    Sodium 123 (*)    IRON AND IRON BINDING CAPACITY - Abnormal    Iron 36 (*)     Iron Binding Capacity 184 (*)     Iron Sat Index 20     HEMOGLOBIN A1C - Abnormal    Hemoglobin A1C 6.6 (*)    LIPASE - Normal    Lipase 13     INFLUENZA A/B & SARS-COV2 PCR MULTIPLEX - Normal    Influenza A PCR Negative      Influenza B PCR Negative      RSV PCR Negative      SARS CoV2 PCR Negative     NT PROBNP INPATIENT - Normal    N terminal Pro BNP Inpatient 289     LACTIC ACID WHOLE BLOOD - Normal    Lactic Acid 1.6     SODIUM RANDOM URINE    Sodium Urine mmol/L <20     CREATININE RANDOM URINE    Creatinine Urine mg/dL 75.3     GLUCOSE MONITOR NURSING POCT   GLUCOSE MONITOR NURSING POCT   GLUCOSE MONITOR NURSING POCT   URINE CULTURE   BLOOD CULTURE   BLOOD CULTURE        Emergency Department Course & Assessments:              Interventions:  Medications   lidocaine (XYLOCAINE) 2 % external gel 10 mL (10 mLs Urethral Not Given 2/3/23 1631)   iopamidol (ISOVUE-370) solution 55 mL (55 mLs Intravenous Given 2/3/23 1332)   Saline flush (82 mLs Intravenous Given 2/3/23 1332)   cefTRIAXone (ROCEPHIN) 2 g vial to attach to  ml bag for ADULTS or NS 50 ml bag for PEDS (2 g Intravenous New Bag 2/3/23 1554)   azithromycin (ZITHROMAX) 500 mg vial to attach to  mL bag (500 mg Intravenous New Bag 2/3/23 1627)        Independent Interpretation (X-rays, CTs, rhythm strip):  None    Consultations/Discussion of Management or Tests:  None        Social Determinants of Health affecting care:   Dementia        Disposition:  The patient was admitted to the hospital under the care of Dr. Childs.     Impression & Plan      Medical Decision Making:  This unfortunate elderly gentleman with a recent admission to the hospital for urinary retention and urinary tract infection with significant metabolic disturbance presents to us in this facility with a chief concern for dark emesis this morning.  Patient tells me that for the past several days he has been weak and having generalized abdominal burning.  He has been taking his iron supplement on an empty stomach, causing darker stools.  After taking his morning meds, he did have an episode of emesis that was dark in color, prompting his visit here.    My chief concern on his presentation was that he was hypoxic and mildly tachycardic.  He denies overt shortness of breath.  He denies upper respiratory symptoms.  A broad differential was considered.  I started with basic lab work which did show a stable hemoglobin.  However, it showed a significant leukocytosis.  Electrolytes and liver function tests are reassuring.  Chest x-ray was clear.  COVID testing was negative.  He has an indwelling catheter and I asked to have this changed out.  In doing so, it shows clear evidence of a urinary tract  infection and he was treated with Rocephin.  However, he is afebrile.  With concerns for his ongoing time in a nursing home with minimal movement, I became concerned that he could be dealing with a pulmonary embolism causing his tachycardia and hypoxia.  Therefore, a D-dimer was sent.  This was elevated prompting a CT of the chest.  While there is no pulmonary embolism, he does show evidence of pneumonia.  Since he had already been treated with Rocephin, I added on azithromycin.  I spoke with him further.  He is no longer hypoxic, sleeping with oxygen levels in the mid 90s without increased work of breathing.  Nonetheless, with these findings he will require admission to the hospital for IV antibiotics and further work-up.  I spoke with Dr. Childs of the hospitalist service who agrees to admit the patient under inpatient status.    Diagnosis:    ICD-10-CM    1. Acute respiratory failure with hypoxia (H)  J96.01       2. Community acquired pneumonia, unspecified laterality  J18.9       3. Acute cystitis without hematuria  N30.00       4. Hyponatremia  E87.1       5. Generalized muscle weakness  M62.81           2/3/2023   Trierweiler, Chad A, MD Trierweiler, Chad A, MD  02/03/23 2139

## 2023-02-03 NOTE — PROGRESS NOTES
RECEIVING UNIT ED HANDOFF REVIEW    ED Nurse Handoff Report was reviewed by: Gin Santos RN on February 3, 2023 at 4:31 PM

## 2023-02-03 NOTE — ED NOTES
"[facility]  ED Nurse Handoff Report    ED Chief complaint: GI Bleeding      ED Diagnosis:   Final diagnoses:   None       Code Status: Full Code    Allergies:   Allergies   Allergen Reactions     Sulfa Drugs Rash       Patient Story:   72 year old male with a history of recurrent urinary tract infection,presents after dark-colored emesis this morning.  He was recently admitted to the hospital for a week in the early part of January for urinary retention, UTI, and encephalopathy.  He has been doing fairly well at his current TCU.  The patient states that he was feeling well this morning.  They gave him all of his medications including his iron supplement.  Approximately 30 minutes later, he felt nauseated and had an episode of vomiting which was dark in color.  He feels better after throwing up.  He describes generalized \"acid feeling\" throughout his abdomen but denies any focal pain. He denies fevers or chills.    Focused Assessment:    A/O x4, suh changed out in ED. No urine output  At time of change, waiting for sample to send for analysis. No cough, lungs clear, pale skin color. Has improved since arrival, no longer requires supplemental O2    Labs Ordered and Resulted from Time of ED Arrival to Time of ED Departure   COMPREHENSIVE METABOLIC PANEL - Abnormal       Result Value    Sodium 124 (*)     Potassium 4.0      Chloride 90 (*)     Carbon Dioxide (CO2) 25      Anion Gap 9      Urea Nitrogen 30.6 (*)     Creatinine 1.14      Calcium 8.9      Glucose 182 (*)     Alkaline Phosphatase 90      AST 23      ALT 15      Protein Total 7.0      Albumin 3.0 (*)     Bilirubin Total 0.3      GFR Estimate 68     CBC WITH PLATELETS AND DIFFERENTIAL - Abnormal    WBC Count 17.4 (*)     RBC Count 3.58 (*)     Hemoglobin 10.0 (*)     Hematocrit 30.4 (*)     MCV 85      MCH 27.9      MCHC 32.9      RDW 15.7 (*)     Platelet Count 556 (*)     % Neutrophils 73      % Lymphocytes 20      % Monocytes 5      % " "Eosinophils 0      % Basophils 0      % Immature Granulocytes 2      NRBCs per 100 WBC 0      Absolute Neutrophils 12.6 (*)     Absolute Lymphocytes 3.5      Absolute Monocytes 0.9      Absolute Eosinophils 0.0      Absolute Basophils 0.0      Absolute Immature Granulocytes 0.3      Absolute NRBCs 0.0     LIPASE - Normal    Lipase 13     ROUTINE UA WITH MICROSCOPIC REFLEX TO CULTURE   SODIUM RANDOM URINE   CREATININE RANDOM URINE   INFLUENZA A/B & SARS-COV2 PCR MULTIPLEX        Chest XR,  PA & LAT    (Results Pending)         Treatments and/or interventions provided:      Medications   sodium chloride (PF) 0.9% PF flush 3 mL (has no administration in time range)   sodium chloride (PF) 0.9% PF flush 3 mL (has no administration in time range)   lidocaine (XYLOCAINE) 2 % external gel 10 mL (has no administration in time range)        Patient's response to treatments and/or interventions:   Resting comfortably    To be done/followed up on inpatient unit:    See any in-patient orders    Does this patient have any cognitive concerns?: NA    Activity level - Baseline/Home:    Stand with Assist    Activity Level - Current:     Stand with assist x2    Patient's Preferred language: English     Needed?: No    Isolation: None  Infection: Not Applicable  Patient tested for COVID 19 prior to admission: YES    Bariatric?: No    Vital Signs:   Vitals:    02/03/23 0910   BP: 115/66   Pulse: 104   Resp: 16   Temp: 97.8  F (36.6  C)   TempSrc: Oral   SpO2: (!) 88%   Weight: 52 kg (114 lb 10.2 oz)   Height: 1.702 m (5' 7\")       Cardiac Rhythm:     Was the PSS-3 completed:   Yes  What interventions are required if any?               Family Comments: none present  OBS brochure/video discussed/provided to patient/family: Yes              Name of person given brochure if not patient:              Relationship to patient:    For the majority of the shift this patient's behavior was Green.   Behavioral interventions performed " were     ED NURSE PHONE NUMBER: *[phone]

## 2023-02-03 NOTE — ED TRIAGE NOTES
Here from TCU at [facility]. Vomited bloody emesis this AM. Felt some nausea and upset last PM, TUMS were no help. VSS for EMS, slightly tachy. Requiring 3L nc O2, was 88% on RA     Triage Assessment     Row Name 02/03/23 0907       Triage Assessment (Adult)    Airway WDL WDL       Respiratory WDL    Respiratory WDL WDL       Skin Circulation/Temperature WDL    Skin Circulation/Temperature WDL X  pale       Cardiac WDL    Cardiac WDL X  tachy       Peripheral/Neurovascular WDL    Peripheral Neurovascular WDL WDL       Cognitive/Neuro/Behavioral WDL    Cognitive/Neuro/Behavioral WDL WDL

## 2023-02-03 NOTE — ED NOTES
Bed: ED02  Expected date:   Expected time:   Means of arrival:   Comments:  Lenore 515 72 M GI bleh[facility]ensia ETA 0900

## 2023-02-03 NOTE — PHARMACY-ADMISSION MEDICATION HISTORY
Pharmacy Medication History  Admission medication history interview status for the 2/3/2023  admission is complete. See EPIC admission navigator for prior to admission medications     Location of Interview: Outside patient room but on unit  Medication history sources: MAR ([facility])    Significant changes made to the medication list:  Added:  - Levothyroxine  - Miralax  - Senna S    Changed:  - Lantus (25u qam --> 10u qam)  - Metformin (500mg BID --> 1000mg AM, 500mg PM, per [facility] MAR)    In the past week, patient estimated taking medication this percent of the time: greater than 90%    Medication Affordability: N/A, Did not assess       Additional medication history information:   Levothyroxine, unsure of last admiministration, new Rx 2/2, so perhaps hasn't started yet.    Medication reconciliation completed by provider prior to medication history? No    Time spent in this activity: 15 minutes    Prior to Admission medications    Medication Sig Last Dose Taking? Auth Provider Long Term End Date   ferrous sulfate (FEROSUL) 325 (65 Fe) MG tablet Take 325 mg by mouth daily 2/3/2023 at am Yes Unknown, Entered By History     insulin glargine (LANTUS PEN) 100 UNIT/ML pen Inject 25 Units Subcutaneous every morning  Patient taking differently: Inject 10 Units Subcutaneous every morning 2/3/2023 at am Yes Courtney Conn, DO Yes    levothyroxine (SYNTHROID/LEVOTHROID) 50 MCG tablet Take 50 mcg by mouth daily Unknown Yes Unknown, Entered By History Yes    linagliptin (TRADJENTA) 5 MG TABS tablet Take 5 mg by mouth daily 2/3/2023 at am Yes Unknown, Entered By History Yes    metFORMIN (GLUCOPHAGE XR) 500 MG 24 hr tablet Give 1000mg in the morning and 500mg in th evening, with meals. 2/3/2023 at x1 (1000mg) Yes Unknown, Entered By History Yes    polyethylene glycol (MIRALAX) 17 g packet Take 1 packet by mouth daily 2/3/2023 at am Yes Unknown, Entered By History     senna-docusate  (SENOKOT-S/PERICOLACE) 8.6-50 MG tablet Take 1 tablet by mouth 2 times daily . Hold for loose stools. 2/3/2023 at x1 Yes Unknown, Entered By History     tamsulosin (FLOMAX) 0.4 MG capsule Take 1 capsule (0.4 mg) by mouth daily 2/3/2023 at am Yes Dex Aguirre MD     ONE TOUCH ULTRA TEST VI STRP as directed    Michael Joe MD         The information provided in this note is only as accurate as the sources available at the time of update(s)

## 2023-02-03 NOTE — H&P
[facility]    History and Physical  Hospitalist    South Tovar MRN# [id number]   Age: 72 year old YOB: 1950     Date of Admission:  2/3/2023    Primary care provider: Mallory Davila          Assessment and Plan:     South Tovar is a 72 year old  male with medical history of recurrent UTIs, chronic urinary retention on Henderson catheter, BPH, iron deficiency anemia, diabetes mellitus senting to the ED with dark-colored emesis.    Patient admitted 1/11-1/18/2023 at [facility] for Klebsiella pneumonia UTI with associated bacteremia, infectious disease followed.  Was treated with 7 days of IV ceftriaxone, transition to 7 days of oral Ceftin on discharge.  Was also noted to have STEVE due to urinary retention that improved with Henderson catheter placement and treatment of sepsis.  Noted to have urinary retention with hydronephrosis, followed by [facility].  Patient was transition to TCU for rehabilitation     Hospitalized at [facility] from 10/22/22-10/24/22 with complicated UTI (CAUTI) dt candida in setting of uncontrolled DM, then ID recommended 2 weeks of fluconazole.    Dark-colored emesis rule out GI bleed.   Chronic iron deficiency anemia  Patient reports received multiple medications this morning on an empty stomach, did not like the way the iron tablet felt making him burp with stomach upset and dark-colored emesis.  --Baseline hemoglobin around 8-9.    Now presenting with hemoglobin of 10.0. Lipase 13  Recent Outpatient EGD and colonoscopy (11/2022)  by WENDY NDIAYE, and reportedly Was found to have polyps in the colon and also in the small intestine, and has seen a surgeon with recommendation for possible resection of part of his small intestine.  No further details of the endoscopy results or the recommended care plan at present.   -- Admit to inpatient.  Monitor hemoglobin levels in 6 hours, a.m, as needed.  IV Protonix once daily ordered.  Will continue oral diet.     [facility] consult requested.  Will hold off oral iron supplements as patient complaining of abdominal discomfort, constipation.  Will consider IV iron, ordered iron studies    Pneumonia likely aspiration.  Patient had emesis this morning.  Denies any fever or chills.  Influenza A, influenza B, RSV, COVID-19 PCR negative.  CXR no acute pathology.  CT chest with contrast no evidence for pulmonary embolism. Patchy predominantly groundglass opacities in both mid and lower lungs are most likely infectious or inflammatory in etiology. Trace bilateral pleural effusions.    -- Received IV ceftriaxone, azithromycin in ED.  Will start on IV Zosyn.  Follow procalcitonin level.  Follow blood cultures.  Aggressive incentive spirometry.    Recurrent complicated catheter associated urinary tract infection  klebsiella pneumoniae UTI with klebsiella bacteremia 1/2023.  Afebrile.  UA with large leukocyte esterase, greater than 182 WBCs.  Lactic acid 1.6.  WBC 17.4.  Received IV ceftriaxone, will start on IV Zosyn.  Follow urine cultures and de-escalate antibiotics.  [facility] consult requested given recurrent UTIs.     Chronic urinary retention with Henderson catheter   BPH  Henderson catheter exchanged in ED.  Will need CT imaging of abdomen and pelvis with contrast likely tomorrow [already received CT chest with contrast today, recent STEVE] to evaluate for hydronephrosis  [facility] consult requested.  Continue PTA Flomax.    Troponin elevated, likely in the setting of demand.   Tachycardia likely from infection.  Elevated D-dimer  In the ED tachycardic with heart rate in 100s.  Denies any chest pain or shortness of breath or palpitations.  Troponin is 64.  [EKG pending) D-dimer 8.09 CT chest with no PE.  Follow troponin levels.  Telemetry monitoring.  Echocardiogram ordered.  IV hydration, treat medical issues.  No active bleeding at this time.  Will start on aspirin 81 mg oral daily.  Significantly  elevated D-dimer, ultrasound to rule out DVT.  Will start on subcutaneous heparin for DVT prophylaxis.  Will monitor hemoglobin levels closely    Hypochloremic hyponatremia likely from vomiting, poor intake.  Sodium 124, chloride 90.  Albumin 3.0.  IV hydration with normal saline at 100 mL/h for 5 hours.  Recheck BMP.  Follow proBNP.    Constipation while on iron pills  CT Moderate to large amount of gas and stool in the visualized portion of the colon suggests constipation.  Hold off oral iron supplements.  Scheduled, as needed bowel meds ordered.    Diabetes mellitus.   * A1c 6.6 in 10/2022.  PTA prescribed 25 units Lantus but only taking 10 units daily  Resume PTA Lantus 10 units daily.  Insulin sliding scale ordered.  Monitor blood sugars, optimize regimen  Hold PTA metformin, linagliptin.  Follow hemoglobin A1c    Hypothyroidism.  Noted recent elevated TSH of 14.80, T4 within normal limits.  Continue PTA levothyroxine.  Repeat thyroid function test in 10 weeks.    Physical deconditioning from medical illness, sternal fragility  Recent fall with left temporal laceration, Sutures removed on 1/16.   Will discharge to TCU.  Recent concern for cognitive impairment/encephalopathy.  In ED at time of admission patient alert oriented and able to answer most questions.  Encourage ambulation    DVT Prophylaxis: SCDs, ambulate.  Subcutaneous heparin for DVT prophylax  Code Status: Full code    Disposition: Expected discharge in greater than 2 days pending clinical improvement.  More than 70% of time spent in direct patient care, care coordination, patient counseling, and formalizing plan of care.     Discussed with patient, his wife over the telephone, ED physician    Gm Ponce MD          Chief Complaint:     History is obtained from patient, transfer records, ED team, his wife over the telephone.    South Tovar is a 72 year old  male with medical history of recurrent UTIs, chronic urinary retention on Henderson  catheter, BPH, iron deficiency anemia, diabetes mellitus senting to the ED with dark-colored emesis    Patient awake, oriented and able to answer most questions.  Reports received multiple medications this morning on an empty stomach.  Reports did not like the way that the iron tablet was making him feel, had an upset stomach and burped, vomited dark-colored emesis.  Reports had received Tums without relief of symptoms.  Patient reports iron pills making him constipated, not had bowel movement in few days.    Denies any fever or chills.  Denies any chest pain or palpitations.  Denies any shortness of breath.  Reports reasonable oral intake.  Complains of generalized weakness.  Reports having Henderson catheter since recent discharge.  Pressing concerns regarding recurrent UTIs  Denies any new joint pain.  Denies any rash.    No recent travel.  Reports compliant with medical therapy.    Patient admitted 1/11-1/18/2023 at [facility] for Klebsiella pneumonia UTI with associated bacteremia, infectious disease followed.  Was treated with 7 days of IV ceftriaxone, transition to 7 days of oral Ceftin on discharge.  Was also noted to have STEVE due to urinary retention that improved with Henderson catheter placement and treatment of sepsis.  Noted to have urinary retention with hydronephrosis, followed by [facility].  Patient was transition to TCU for rehabilitation     Hospitalized at [facility] from 10/22/22-10/24/22 with complicated UTI (CAUTI) dt candida in setting of uncontrolled DM, then ID recommended 2 weeks of fluconazole.    In ED afebrile.  Tachycardic with heart rate in 100s.  Blood pressure systolic in 90s to 100s.  No suprapubic tenderness.  No costovertebral angle tenderness.  Sodium 124, chloride 90.  Albumin 3.0.  Creatinine 1.14, potassium 4.0.  Glucose 182.  Lactic acid 1.6.  Lipase 13.  WBC 17.4.  Hemoglobin 10.0.  D-dimer 8.09  UA with large leukocyte esterase, greater than 182 WBCs.  Blood  cultures, urine cultures pending.  Influenza A, influenza B, RSV, COVID-19 PCR negative.  CXR no acute pathology.  CT chest with contrast no evidence for pulmonary embolism. Patchy predominantly groundglass opacities in both mid and lower lungs are most likely infectious or inflammatory in etiology. Trace bilateral pleural effusions.  Moderate to large amount of gas and stool in the visualized  portion of the colon suggests constipation.          Review of Systems:     GENERAL: no fever or chills  EENT: No new vision changes, no sinus problems  PULMONARY: No shortness of breath  CARDIAC: no chest pain  GI: See HPI.  : No burning/pain with urination, chronic Henderson  NEURO:  no dizziness, no loss of consciousness  ENDOCRINE: No excessive thirst  MUSCULOSKELETAL: No new joint pain  SKIN: No skin rashes  PSYCHIATRY no new anxiety     Medical History:     Past Medical History:   Diagnosis Date     Anemia, iron deficiency      Other and unspecified hyperlipidemia      Shingles 2013     Type II or unspecified type diabetes mellitus without mention of complication, not stated as uncontrolled     diet controlled        Surgical History:      Past Surgical History:   Procedure Laterality Date     CATARACT IOL, RT/LT  1997    bilaterally             Social History:      Social History     Tobacco Use     Smoking status: Former     Types: Cigarettes     Smokeless tobacco: Never   Substance Use Topics     Alcohol use: Not Currently     Comment: few drinks a year             Family History:     Family History   Problem Relation Age of Onset     Eye Disorder Mother      Alzheimer Disease Mother      Diabetes Father      Cancer - colorectal Father      Lung Cancer Father      Cancer Maternal Grandmother         kidney     Cancer Maternal Grandfather         lung cancer-smoker and      Cancer Paternal Grandmother         leukemia     Unknown/Adopted Paternal Grandfather              Allergies:     Allergies   Allergen  Reactions     Sulfa Drugs Rash             Medications:   Home meds reviewed          Physical Exam      Admission Weight: 52 kg (114 lb 10.2 oz)    Vital Signs with Ranges  Temp:  [97.8  F (36.6  C)] 97.8  F (36.6  C)  Pulse:  [104] 104  Resp:  [16] 16  BP: (115)/(66) 115/66  SpO2:  [88 %-96 %] 95 %    PHYSICAL EXAM  GENERAL: Patient is in no distress. Alert and oriented.   HEENT: Oropharynx pink, moist.  Extraocular muscle movements intact.  Healing scar over the forehead  HEART: Regular rate and rhythm. S1S2. No murmurs  LUNGS: Bilateral slightly decreased breath sounds  Respirations unlabored  ABDOMEN: Soft, no abdominal tenderness, bowel sounds heard   NEURO: Moving all extremities, no focal weakness.  EXTREMITIES: No pedal edema.  SKIN: Warm, dry. No rash .  PSYCHIATRY Cooperative         Data:   All new lab and imaging data was reviewed.

## 2023-02-04 ENCOUNTER — APPOINTMENT (OUTPATIENT)
Dept: CARDIOLOGY | Facility: CLINIC | Age: 73
DRG: 380 | End: 2023-02-04
Attending: HOSPITALIST
Payer: COMMERCIAL

## 2023-02-04 LAB
ANION GAP SERPL CALCULATED.3IONS-SCNC: 12 MMOL/L (ref 7–15)
BUN SERPL-MCNC: 37.2 MG/DL (ref 8–23)
CALCIUM SERPL-MCNC: 8.4 MG/DL (ref 8.8–10.2)
CHLORIDE SERPL-SCNC: 93 MMOL/L (ref 98–107)
CREAT SERPL-MCNC: 1.52 MG/DL (ref 0.67–1.17)
DEPRECATED HCO3 PLAS-SCNC: 20 MMOL/L (ref 22–29)
ERYTHROCYTE [DISTWIDTH] IN BLOOD BY AUTOMATED COUNT: 15.9 % (ref 10–15)
GFR SERPL CREATININE-BSD FRML MDRD: 48 ML/MIN/1.73M2
GLUCOSE BLDC GLUCOMTR-MCNC: 100 MG/DL (ref 70–99)
GLUCOSE BLDC GLUCOMTR-MCNC: 108 MG/DL (ref 70–99)
GLUCOSE BLDC GLUCOMTR-MCNC: 54 MG/DL (ref 70–99)
GLUCOSE BLDC GLUCOMTR-MCNC: 98 MG/DL (ref 70–99)
GLUCOSE BLDC GLUCOMTR-MCNC: 99 MG/DL (ref 70–99)
GLUCOSE SERPL-MCNC: 100 MG/DL (ref 70–99)
HCT VFR BLD AUTO: 25.4 % (ref 40–53)
HGB BLD-MCNC: 8.4 G/DL (ref 13.3–17.7)
HGB BLD-MCNC: 9 G/DL (ref 13.3–17.7)
LACTATE SERPL-SCNC: 0.8 MMOL/L (ref 0.7–2)
LACTATE SERPL-SCNC: 2.1 MMOL/L (ref 0.7–2)
LVEF ECHO: NORMAL
MAGNESIUM SERPL-MCNC: 1.8 MG/DL (ref 1.7–2.3)
MCH RBC QN AUTO: 27.6 PG (ref 26.5–33)
MCHC RBC AUTO-ENTMCNC: 33.1 G/DL (ref 31.5–36.5)
MCV RBC AUTO: 84 FL (ref 78–100)
PLATELET # BLD AUTO: 505 10E3/UL (ref 150–450)
POTASSIUM SERPL-SCNC: 3.6 MMOL/L (ref 3.4–5.3)
RBC # BLD AUTO: 3.04 10E6/UL (ref 4.4–5.9)
SODIUM SERPL-SCNC: 125 MMOL/L (ref 136–145)
SODIUM SERPL-SCNC: 126 MMOL/L (ref 136–145)
TROPONIN T SERPL HS-MCNC: 106 NG/L
TROPONIN T SERPL HS-MCNC: 125 NG/L
WBC # BLD AUTO: 13.8 10E3/UL (ref 4–11)

## 2023-02-04 PROCEDURE — 999N000208 ECHOCARDIOGRAM COMPLETE: Mod: 52

## 2023-02-04 PROCEDURE — 99233 SBSQ HOSP IP/OBS HIGH 50: CPT | Performed by: INTERNAL MEDICINE

## 2023-02-04 PROCEDURE — 255N000002 HC RX 255 OP 636: Performed by: HOSPITALIST

## 2023-02-04 PROCEDURE — 258N000003 HC RX IP 258 OP 636: Performed by: INTERNAL MEDICINE

## 2023-02-04 PROCEDURE — 93306 TTE W/DOPPLER COMPLETE: CPT | Mod: 26 | Performed by: INTERNAL MEDICINE

## 2023-02-04 PROCEDURE — 36415 COLL VENOUS BLD VENIPUNCTURE: CPT | Performed by: HOSPITALIST

## 2023-02-04 PROCEDURE — 250N000011 HC RX IP 250 OP 636: Performed by: HOSPITALIST

## 2023-02-04 PROCEDURE — 250N000013 HC RX MED GY IP 250 OP 250 PS 637: Performed by: INTERNAL MEDICINE

## 2023-02-04 PROCEDURE — 120N000001 HC R&B MED SURG/OB

## 2023-02-04 PROCEDURE — 83605 ASSAY OF LACTIC ACID: CPT | Performed by: SPECIALIST

## 2023-02-04 PROCEDURE — C9113 INJ PANTOPRAZOLE SODIUM, VIA: HCPCS | Performed by: HOSPITALIST

## 2023-02-04 PROCEDURE — 84484 ASSAY OF TROPONIN QUANT: CPT | Performed by: INTERNAL MEDICINE

## 2023-02-04 PROCEDURE — 250N000013 HC RX MED GY IP 250 OP 250 PS 637: Performed by: HOSPITALIST

## 2023-02-04 PROCEDURE — 250N000012 HC RX MED GY IP 250 OP 636 PS 637: Performed by: HOSPITALIST

## 2023-02-04 PROCEDURE — 36415 COLL VENOUS BLD VENIPUNCTURE: CPT | Performed by: SPECIALIST

## 2023-02-04 PROCEDURE — 85027 COMPLETE CBC AUTOMATED: CPT | Performed by: HOSPITALIST

## 2023-02-04 PROCEDURE — 80048 BASIC METABOLIC PNL TOTAL CA: CPT | Performed by: HOSPITALIST

## 2023-02-04 PROCEDURE — 83735 ASSAY OF MAGNESIUM: CPT | Performed by: HOSPITALIST

## 2023-02-04 RX ORDER — BISACODYL 10 MG
10 SUPPOSITORY, RECTAL RECTAL ONCE
Status: COMPLETED | OUTPATIENT
Start: 2023-02-04 | End: 2023-02-04

## 2023-02-04 RX ORDER — SODIUM CHLORIDE 9 MG/ML
INJECTION, SOLUTION INTRAVENOUS CONTINUOUS
Status: DISCONTINUED | OUTPATIENT
Start: 2023-02-04 | End: 2023-02-05

## 2023-02-04 RX ORDER — PANTOPRAZOLE SODIUM 40 MG/1
40 TABLET, DELAYED RELEASE ORAL
Status: DISCONTINUED | OUTPATIENT
Start: 2023-02-05 | End: 2023-02-06

## 2023-02-04 RX ADMIN — SENNOSIDES AND DOCUSATE SODIUM 2 TABLET: 50; 8.6 TABLET ORAL at 09:09

## 2023-02-04 RX ADMIN — SODIUM CHLORIDE 250 ML: 9 INJECTION, SOLUTION INTRAVENOUS at 18:50

## 2023-02-04 RX ADMIN — BISACODYL 10 MG: 10 SUPPOSITORY RECTAL at 10:43

## 2023-02-04 RX ADMIN — SODIUM CHLORIDE: 9 INJECTION, SOLUTION INTRAVENOUS at 19:54

## 2023-02-04 RX ADMIN — PIPERACILLIN AND TAZOBACTAM 3.38 G: 3; .375 INJECTION, POWDER, FOR SOLUTION INTRAVENOUS at 05:04

## 2023-02-04 RX ADMIN — SENNOSIDES AND DOCUSATE SODIUM 2 TABLET: 50; 8.6 TABLET ORAL at 19:54

## 2023-02-04 RX ADMIN — HEPARIN SODIUM 5000 UNITS: 5000 INJECTION, SOLUTION INTRAVENOUS; SUBCUTANEOUS at 09:10

## 2023-02-04 RX ADMIN — HUMAN ALBUMIN MICROSPHERES AND PERFLUTREN 9 ML: 10; .22 INJECTION, SOLUTION INTRAVENOUS at 11:58

## 2023-02-04 RX ADMIN — TAMSULOSIN HYDROCHLORIDE 0.4 MG: 0.4 CAPSULE ORAL at 09:09

## 2023-02-04 RX ADMIN — PIPERACILLIN AND TAZOBACTAM 3.38 G: 3; .375 INJECTION, POWDER, FOR SOLUTION INTRAVENOUS at 16:30

## 2023-02-04 RX ADMIN — INSULIN GLARGINE 10 UNITS: 100 INJECTION, SOLUTION SUBCUTANEOUS at 10:42

## 2023-02-04 RX ADMIN — ASPIRIN 81 MG: 81 TABLET, COATED ORAL at 09:09

## 2023-02-04 RX ADMIN — PIPERACILLIN AND TAZOBACTAM 3.38 G: 3; .375 INJECTION, POWDER, FOR SOLUTION INTRAVENOUS at 10:43

## 2023-02-04 RX ADMIN — PANTOPRAZOLE SODIUM 40 MG: 40 INJECTION, POWDER, FOR SOLUTION INTRAVENOUS at 09:09

## 2023-02-04 RX ADMIN — HEPARIN SODIUM 5000 UNITS: 5000 INJECTION, SOLUTION INTRAVENOUS; SUBCUTANEOUS at 19:56

## 2023-02-04 RX ADMIN — POLYETHYLENE GLYCOL 3350 17 G: 17 POWDER, FOR SOLUTION ORAL at 09:08

## 2023-02-04 RX ADMIN — SODIUM CHLORIDE: 9 INJECTION, SOLUTION INTRAVENOUS at 10:44

## 2023-02-04 RX ADMIN — PIPERACILLIN AND TAZOBACTAM 3.38 G: 3; .375 INJECTION, POWDER, FOR SOLUTION INTRAVENOUS at 23:29

## 2023-02-04 RX ADMIN — LEVOTHYROXINE SODIUM 50 MCG: 50 TABLET ORAL at 06:40

## 2023-02-04 ASSESSMENT — ACTIVITIES OF DAILY LIVING (ADL)
ADLS_ACUITY_SCORE: 37
ADLS_ACUITY_SCORE: 45
ADLS_ACUITY_SCORE: 37
ADLS_ACUITY_SCORE: 45
ADLS_ACUITY_SCORE: 37
ADLS_ACUITY_SCORE: 45
ADLS_ACUITY_SCORE: 37

## 2023-02-04 NOTE — PROGRESS NOTES
"[facility] message sent to Dr. Ponce: \"2410 J.L. Would you like GI to determine diet order? Would it be ok for ice chips until then? Also patient states he is not allergic to Sulfa and would like this to be removed. Thanks, Janice RN *[id number]\"    Orders received for full liquid diet.  "

## 2023-02-04 NOTE — CONSULTS
Gastroenterology Consultation      [place] MRN# [id number]   YOB: 1950 Age: 72 year old   Date of Admission: 2/3/2023     Reason for consult: I was asked by Dr Ponce to evaluate this patient for coffee ground emesis.               History of Present Illness:   This patient is a 72 year old male who presents with coffee ground emesis. He has been on iron that has been causing constipation. Notes increasing indigestion/reflux the past few days. It is an especially difficult time taking all his morning pills on an empty stomach. Yesterday, he threw up coffee ground like material after his pills. He has not had a good stool in some time. Stools are dark since starting iron. CT shows significant constipation and likely pneumonia. Hemoglobin near baseline and troponin elevated. He had an EGD and colonoscopy in November for anemia. EGD unremarkable. Has large cecal polyp/mass that needs resection.              Past Medical History:     Past Medical History:   Diagnosis Date     Anemia, iron deficiency      Other and unspecified hyperlipidemia      Shingles 2013     Type II or unspecified type diabetes mellitus without mention of complication, not stated as uncontrolled     diet controlled             Past Surgical History:     Past Surgical History:   Procedure Laterality Date     CATARACT IOL, RT/LT  1997    bilaterally               Social History:     Social History     Socioeconomic History     Marital status:      Spouse name: Jihan     Number of children: 1     Years of education: 15     Highest education level: Not on file   Occupational History     Occupation: Planner     Employer: [employer]   Tobacco Use     Smoking status: Former     Types: Cigarettes     Smokeless tobacco: Never   Substance and Sexual Activity     Alcohol use: Not Currently     Comment: few drinks a year     Drug use: No     Sexual activity: Yes     Partners: Female   Other Topics Concern     Not on file  "  Social History Narrative    ** Merged History Encounter **         Dairy/d 2-4 servings/d.     Caffeine 3-4 servings/d    Exercise 2-3 x week    Sunscreen used - Yes    Seatbelts used - Yes    Working smoke/CO detectors in the home - Yes    Guns stored in the home - Yes    Self Breast Exams - NA    Self Testicular Exam - Yes    Eye Exam up to date - Yes    Dental Exam up to date - Yes    Pap Smear up to date - NA    Mammogram up to date - NA    PSA up to date - been a couple of years    Dexa Scan up to date - NA    Flex Sig / Colonoscopy up to date - >2-3 years normal    Immunizations up to date - less than 10 years but not sure when    Abuse: Current or Past(Physical, Sexual or Emotional)- No    Do you feel safe in your environment - Yes         Social Determinants of Health     Financial Resource Strain: Not on file   Food Insecurity: Not on file   Transportation Needs: Not on file   Physical Activity: Not on file   Stress: Not on file   Social Connections: Not on file   Intimate Partner Violence: Not on file   Housing Stability: Not on file             Family History:     Family History   Problem Relation Age of Onset     Eye Disorder Mother      Alzheimer Disease Mother      Diabetes Father      Cancer - colorectal Father      Lung Cancer Father      Cancer Maternal Grandmother         kidney     Cancer Maternal Grandfather         lung cancer-smoker and      Cancer Paternal Grandmother         leukemia     Unknown/Adopted Paternal Grandfather              Allergies:    No Active Allergies          Medications:     No current outpatient medications on file.             Review of Systems:   10-point review of systems negative except as noted in HPI.            Physical Exam:   Vitals were reviewed  /59 (BP Location: Right arm)   Pulse 88   Temp 97.8  F (36.6  C) (Oral)   Resp 16   Ht 1.702 m (5' 7\")   Wt 53 kg (116 lb 13.5 oz)   SpO2 97%   BMI 18.30 kg/m    Constitutional:   No distress "     Heent:   NC/AT, sclera anicteric     Neck:   No adenopathy, thyroid not palpable   Respiratory:   CTA anteriorly     Cardiovascular:   RRR, no murmur     Gastrointestinal:   Active BS, distended and tympanic, minimal right-sided pain     Extremities:   No clubbing, cyanosis or edema   Neuro:   Grossly nonfocal     Skin:   No rashes or ecchymoses   Psychiatry:        No evidence of anxiety or depression         Data:     ROUTINE IP LABS  BMP  Last Comprehensive Metabolic Panel:  Sodium   Date Value Ref Range Status   02/04/2023 125 (L) 136 - 145 mmol/L Final   06/26/2006 140 133 - 144 mmol/L Final     Potassium   Date Value Ref Range Status   02/04/2023 3.6 3.4 - 5.3 mmol/L Final   01/17/2023 3.6 3.4 - 5.3 mmol/L Final   06/26/2006 4.6 3.4 - 5.3 mmol/L Final     Chloride   Date Value Ref Range Status   02/04/2023 93 (L) 98 - 107 mmol/L Final   01/17/2023 101 94 - 109 mmol/L Final   06/26/2006 104 94 - 109 mmol/L Final     Carbon Dioxide   Date Value Ref Range Status   06/26/2006 28 20 - 32 mmol/L Final     Carbon Dioxide (CO2)   Date Value Ref Range Status   02/04/2023 20 (L) 22 - 29 mmol/L Final   01/17/2023 28 20 - 32 mmol/L Final     Anion Gap   Date Value Ref Range Status   02/04/2023 12 7 - 15 mmol/L Final   01/17/2023 7 3 - 14 mmol/L Final   06/26/2006 7 6 - 17 mmol/L Final     Glucose   Date Value Ref Range Status   02/04/2023 100 (H) 70 - 99 mg/dL Final   01/17/2023 138 (H) 70 - 99 mg/dL Final   06/26/2006 151 (H) 60 - 110 mg/dL Final     GLUCOSE BY METER POCT   Date Value Ref Range Status   02/04/2023 98 70 - 99 mg/dL Final     Urea Nitrogen   Date Value Ref Range Status   02/04/2023 37.2 (H) 8.0 - 23.0 mg/dL Final   01/17/2023 40 (H) 7 - 30 mg/dL Final   06/26/2006 12 7 - 30 mg/dL Final     Creatinine   Date Value Ref Range Status   02/04/2023 1.52 (H) 0.67 - 1.17 mg/dL Final   06/26/2006 1.00 0.80 - 1.50 mg/dL Final     GFR Estimate   Date Value Ref Range Status   02/04/2023 48 (L) >60 mL/min/1.73m2  Final     Comment:     eGFR calculated using 2021 CKD-EPI equation.   06/26/2006 82 >60 mL/min/1.7m2 Final     Calcium   Date Value Ref Range Status   02/04/2023 8.4 (L) 8.8 - 10.2 mg/dL Final   06/26/2006 9.3 8.5 - 10.4 mg/dL Final       CBC  Lab Results   Component Value Date    WBC 13.8 02/04/2023     Lab Results   Component Value Date    RBC 3.04 02/04/2023     Lab Results   Component Value Date    HGB 8.4 02/04/2023     Lab Results   Component Value Date    HCT 25.4 02/04/2023     No components found for: MCT  Lab Results   Component Value Date    MCV 84 02/04/2023     Lab Results   Component Value Date    MCH 27.6 02/04/2023     Lab Results   Component Value Date    MCHC 33.1 02/04/2023     Lab Results   Component Value Date    RDW 15.9 02/04/2023     Lab Results   Component Value Date     02/04/2023       INR  Lab Results   Component Value Date    INR 1.20 09/30/2022       PANCREAS  Recent Labs   Lab 02/03/23  0917   LIPASE 13     LFT  Recent Labs   Lab 02/03/23  0917   PROTTOTAL 7.0   ALBUMIN 3.0*   BILITOTAL 0.3   ALKPHOS 90   AST 23   ALT 15                Assessment and Plan:   Coffee-ground emesis. Hemoglobin stable, recent normal EGD, with pneumonia and elevated troponin. Would give short course pantoprazole and hold on EGD unless significant bleeding.    Constipation: will give dulcolax suppository now, to get Miralax and senna.    Iron therapy: having significant symptoms. When better, try ferrous gluconate which has fewer GI side effects.    Cecal polyp/mass: has seen surgery already, needs resection.    25 minutes spent on patient care.     Alod Morales MD  [facility]  Office:  [phone]

## 2023-02-04 NOTE — PROVIDER NOTIFICATION
MD Notification    Notified Person: MD    Notified Person Name: jared yu    Notification Date/Time: 02/03/23  6:11 PM    Notification Interaction: mirella     Purpose of Notification: Pt has no diet order. are you able to place please? Also, I see in your note, telemetry monitoring but no order, will you please place order if you want cardiac monitoring?       Orders Received: full liquids. Cardiac monitoring placed      02/03/23   6:33 PM   FYI BP 88/55. recheck 88/52   Orders received: continue IV fluids and recheck in 1 hour      Comments:

## 2023-02-04 NOTE — PLAN OF CARE
Summary: GI bleed rule out; pneumonia;   Date & Time: 1730-1930   Orientation: A&Ox4, forgetful   Activity Level: not OOB this shift  Fall Risk: yes  Behavior & Aggression: green   Pain Management: denies   ABNL VS/O2: VSS on RA, low Bps- MD aware  ABNL Lab/BG: Hgb q6hr. . UA-abnormal. BC-pending   Diet: full liquids; denies N/V  Bowel/Bladder: Henderson in place. Constipated, no BM this shift  Drains/Devices: LPIV running NS at 100 mL/hr. L-PIV-SL. Henderson in place  Tests/Procedures: Echo ordered  Anticipated  DC Date: pending clinical improvement   Other Important Info: GI/Urology consulted

## 2023-02-04 NOTE — PROGRESS NOTES
[facility]    Hospitalist Progress Note    Assessment & Plan   [place] is a 72 year old male with PMHx of chronic urinary retention with chronic suh, recurrent UTIs, BPH, chronic iron deficiency anemia and DM II who was admitted on 2/3/2023 for evaluation of dark-colored emesis concerning for GI bleed.     He was previously admitted at [facility] from 10/22/22-10/24/22 with complicated UTI (CAUTI) dt candida in setting of uncontrolled DM, then ID recommended 2 weeks of fluconazole.     More recently, he was admitted at [facility] from 1/1123 - 1/18/23 for Klebsiella pneumonia UTI with associated bacteremia. Seen by ID that stay, was treated with 7 days of IV ceftriaxone and discharge on an additional 7 days of oral Ceftin. That hospital stay was also complicated by STEVE dt urinary retention that improved with Suh catheter placement and treatment of sepsis.  Was noted to have urinary retention with hydronephrosis, followed by [facility]. Ultimately discharged to TCU for ongoing therapy. Staff at his TCU noted dark colored emesis and patient was brought to ED for evaluation.      Dark-colored emesis, concern for GI bleed  Chronic iron deficiency anemia  Cecal polyp  * On iron supplement for hx of iron deficiency anemia. Baseline hgb is 8-9, had been 10.4 per labs on 1/30/23.   * Last OP EGD/colonoscopy in 11/2022 per [facility] were notable for polyps in the colon and also in the small intestine. Had been referred to a surgeon with recommendation for possible resection of part of his small intestine. No further details of the endoscopy results or the recommended care plan at present.   * On the morning of admission, patient had received multiple medications on an empty stomach. Reported he did not like the way the iron tablet felt making him burp with stomach upset and dark-colored emesis.  * In ED, was afebrile, mildly tachycardic but BPs stable. Hgb 10.0, WBC 17.4. Na 122. Protonix 40mg IV daily  started on admission.  * [facility] consulted and following this stay. Recommended to cont PPI. Defer EGD for now with stable hgb. Will need to follow up with surgeon as previously advised.   -- serial hgbs trended down: 10.0 -- 9.4 -- 8.4 this AM;  but still within his baseline  -- cont Protonix 40mg IV daily  -- holding oral iron for now, consider IV iron this stay -- will transition to ferrous gluconate at discharge (per GI, has fewer GI side effects)  -- holding off on EGD for now unless patient experiences significant bleeding  -- was due to follow up with [facility] colorectal surgery on 2/6/23 (Dr. Micheal Pink), will need to reschedule if patient still hospitalized    Suspected aspiration pneumonitis vs aspiration pneumonia  * Had episode of emesis on 2/3 AM after taking meds. No reported cp/sob/cough, fevers/chills.  * In ED, O2 sats briefly 88% on RA but quickly improved to 90s on RA. Briefly tachycardic. Viral swabs neg. CXR neg for acute infiltrate. CTA chest was neg for PE, did mention patchy predominantly groundglass opacities in both mid and lower lungs, ddx includes infectious vs inflammatory etiology. Also noted to have trace bilateral pleural effusions. ProBNP neg. WBC 17. Procal 0.16 (low risk). Given ceftriaxone and azithromycin in the ED. Changed to Zosyn on admission.   -- cont Zosyn for now given suspected concurrent UTI  -- O2 sats stable on RA  -- pulmonary toilet with IS/OOB as able     Recurrent, complicated, catheter associated urinary tract infection  Hx of UTIs, most recently dt klebsiella pneumoniae with associated klebsiella bacteremia in 1/2023  * As above, recently completed course of abx. No reported urinary complaints on presentation. HAs chronic suh. Due to follow up with urology on 2/7/23.   * In ED, was afebrile. Mildly tachycardic. WBC 17 with mildly elevated procal UA abnl with large leukocyte esterase, greater than 182 WBCs. Lactate nl. Started on abx as above.   -- cont  Zosyn  -- urine/blood cultures pending  -- cont Zosyn for now  -- [facility] consulted on admission dt recurrent UTI -- no specific concerns, recommended follow up in clinic    STEVE  Chronic urinary retention with Suh catheter   BPH  Hx of STEVE dt urinary retention, improved with suh catheter placement, no baseline CKD  * Baseline Cr is around 1. Cr had been as high as 3 during recent hospital stay, normalized after placement fo suh. Discharged with suh in place.   * Suh catheter exchanged in ED. Cr stable on admission  -- suh draining without concerns this stay  -- mild STEVE today with Cr 1.1 --> 1.5 today, suspect some degree of injury dt contrast with CT on admission.   -- as above, no acute concerns per [facility], will need to follow up with urology after discharge  -- cont Flomax     Troponin elevated, likely in the setting of demand  Elevated D-dimer, no DVT/PE  * As above, was tachycardic on admission with HRs 100s. No cp/palpitations or shortness of breath.   * Incidentally found to have elevated trops this stay (HS trop 84 -- 88 on recheck). EKG showed NSR w/o acute ischemic changes. Noted to have elevated d-dimer. CT chest neg for PE.  but bilateral LE US was neg for DVT. ASA 81mg daily started on admission.   -- serial trops trending up (now 106 -- 125) but in absence of chest pain and worsening renal function make situation difficult to interpret  -- cont ASA 81mg daily for now  -- echocardiogram ordered to assess for WMAs  -- cont telemetry for now  -- mgmt of above medical issues     Hypochloremic hyponatremia likely from vomiting, poor intake  * Na 124 and Cl 90 on presentation with. Started on IVFs (given 100ml/h x 5hrs)  -- Na 125 this AM  -- given worsening renal function, will resume IVFs with NS @100m/h  -- encourage po intake    Constipation, dt oral iron  * CT on admission was notable for a moderate to large amount of gas and stool in the visualized portion of the colon suggestive of  constipation.   * Oral iron held this stay and was placed on bowel regimen  -- cont bowel regimen     DM II, on insulin  * A1c 6.6 in 10/2022 --> 6.6 on 2/3/23.  * PTA meds: Lantus 25U daily (Had only been taking 10U daily), metformin and linagliptin   -- conts on Lantus 10U daily plus sliding scale insulin  -- holding other meds    Recent Labs   Lab 02/04/23  0853 02/04/23  0739 02/03/23  2126 02/03/23  1848 02/03/23  0917   GLC 98 100* 164* 130* 182*       Hypothyroidism  * Recent labs showed TSH 14.8, FT4 nl but in setting of recent acute illness  * Chronic and stable on levothyroxine.   -- repeat thyroid studies in 10 wks     Physical deconditioning dt medical illness, sternal fragility  Recent fall with left temporal laceration, sutures removed on 1/16/23  * Had been at TCU following recent hospitalization. Noted concerns for cognitive impairment/encephalopathy.    * Oriented on admission this stay  -- anticipate return to TCU at discharge    FEN: resumed IVFs on 2/4 as above, lytes stable, cardiac diet  DVT Prophylaxis: subQ heparin, PCDs  Code Status: Full Code    Disposition: Pending ongoing workup, culture data -- suspect 2d still. Anticipate discharge back to TCU.     Courtney Conn, DO    Medical Decision Making       -------------------------- BILLING ON TIME ------------------------------------------------------------------------------------------------------------  60 MINUTES SPENT BY ME on the date of service doing chart review, history, exam, documentation & further activities per the note.         Interval History   Chart reviewed, including recent hospital notes, TCU notes and current medical chart. Seen this morning. Feeling well. No specific complaints. Denies cp/sob/cough, abd pain/n/v. Seems to be in good spirits.    -Data reviewed today: I reviewed all new labs and imaging results over the last 24 hours. I personally reviewed no images or EKG's today.    Physical Exam   Temp: 97.8  F  (36.6  C) Temp src: Oral BP: 107/59 Pulse: 88   Resp: 16 SpO2: 97 % O2 Device: None (Room air)    Vitals:    02/03/23 0910 02/04/23 0617   Weight: 52 kg (114 lb 10.2 oz) 53 kg (116 lb 13.5 oz)     Vital Signs with Ranges  Temp:  [97.8  F (36.6  C)-98.7  F (37.1  C)] 97.8  F (36.6  C)  Pulse:  [] 88  Resp:  [16] 16  BP: ()/(52-87) 107/59  SpO2:  [94 %-98 %] 97 %  I/O last 3 completed shifts:  In: 480 [P.O.:480]  Out: 450 [Urine:450]    Constitutional: Frail appearing male, resting comfortably, alert and answering basic questions appropriately, NAD  Respiratory: CTAB, no wheeze/rales/rhonchi, no increased work of breathing  Cardiovascular: HRRR, no MGR, no LE edema  GI: S, NT, ND, +BS  Skin/Integumen: warm/dry, previously noted forehead laceration is healing well  Other: Henderson draining clear yellow urine    Medications       aspirin  81 mg Oral Daily     bisacodyl  10 mg Rectal Once     heparin ANTICOAGULANT  5,000 Units Subcutaneous BID     insulin aspart  1-7 Units Subcutaneous TID AC     insulin aspart  1-5 Units Subcutaneous At Bedtime     insulin glargine  10 Units Subcutaneous QAM     levothyroxine  50 mcg Oral QAM AC     [START ON 2/5/2023] pantoprazole  40 mg Oral QAM AC     piperacillin-tazobactam  3.375 g Intravenous Q6H     polyethylene glycol  17 g Oral Daily     senna-docusate  2 tablet Oral BID     sodium chloride (PF)  3 mL Intracatheter Q8H     sodium chloride (PF)  3 mL Intracatheter Q8H     tamsulosin  0.4 mg Oral Daily       Data   Recent Labs   Lab 02/04/23  0853 02/04/23  0739 02/03/23  2344 02/03/23  2126 02/03/23  1848 02/03/23  1544 02/03/23  0917 01/30/23  0907 01/30/23  0907   WBC  --  13.8*  --   --   --   --  17.4*  --  14.4*   HGB  --  8.4* 9.0*  --   --   --  10.0*  --  10.0*   MCV  --  84  --   --   --   --  85  --  90   PLT  --  505*  --   --   --   --  556*  --  473*   NA  --  125* 126*  --   --  123* 124*   < >  --    POTASSIUM  --  3.6  --   --   --   --  4.0  --   --     CHLORIDE  --  93*  --   --   --   --  90*  --   --    CO2  --  20*  --   --   --   --  25  --   --    BUN  --  37.2*  --   --   --   --  30.6*  --   --    CR  --  1.52*  --   --   --   --  1.14  --   --    ANIONGAP  --  12  --   --   --   --  9  --   --    MARNI  --  8.4*  --   --   --   --  8.9  --   --    GLC 98 100*  --  164*   < >  --  182*  --   --    ALBUMIN  --   --   --   --   --   --  3.0*  --   --    PROTTOTAL  --   --   --   --   --   --  7.0  --   --    BILITOTAL  --   --   --   --   --   --  0.3  --   --    ALKPHOS  --   --   --   --   --   --  90  --   --    ALT  --   --   --   --   --   --  15  --   --    AST  --   --   --   --   --   --  23  --   --    LIPASE  --   --   --   --   --   --  13  --   --     < > = values in this interval not displayed.       Recent Results (from the past 24 hour(s))   Chest XR,  PA & LAT    Narrative    XR CHEST 2 VIEWS 2/3/2023 10:51 AM    HISTORY: new hypoxia    COMPARISON: 11/25/2022      Impression    IMPRESSION: No focal infiltrate, pleural effusion or pneumothorax.  Normal heart size.    ATIF JUÁREZ MD         SYSTEM ID:  [id number]   CT Chest Pulmonary Embolism w Contrast    Narrative    CT CHEST PULMONARY EMBOLISM With CONTRAST 2/3/2023 1:45 PM    CLINICAL HISTORY: Hypoxia. Tachycardia.  TECHNIQUE: CT angiogram chest during arterial phase injection IV  contrast. 2D and 3D MIP reconstructions were performed by the CT  technologist. Dose reduction techniques were used.   CONTRAST: 55mL [id number]  COMPARISON: CT of the chest, abdomen, and pelvis performed 11/25/2022.    FINDINGS:  ANGIOGRAM CHEST: Pulmonary arteries are normal caliber and negative  for pulmonary emboli. Thoracic aorta is negative for dissection. No CT  evidence of right heart strain.    LUNGS AND PLEURA: Trace amount of pleural fluid bilaterally. A few  small calcified granulomas bilaterally. There is patchy predominantly  groundglass opacities in both mid and lower lungs, new since the  previous  exam, and most likely infectious or inflammatory. Mild  atelectasis at both lung bases posteriorly. Mild bronchial wall  thickening in both lower lobes of the lung.    MEDIASTINUM/AXILLAE: No enlarged lymph nodes in the chest. No  pericardial effusion.    CORONARY ARTERY CALCIFICATION: Moderate.    UPPER ABDOMEN: Small hiatal hernia. Moderate to large amount of gas  and stool in the visualized portion of the colon. Limited views of the  upper abdomen are otherwise unremarkable.    MUSCULOSKELETAL: Unremarkable.      Impression    IMPRESSION:  1.  No evidence for pulmonary embolism.  2.  Patchy predominantly groundglass opacities in both mid and lower  lungs are most likely infectious or inflammatory in etiology.  3.  Trace bilateral pleural effusions.  4.  Moderate to large amount of gas and stool in the visualized  portion of the colon suggests constipation.    BYRON FRENCH MD         SYSTEM ID:  [id number]   US Lower Extremity Venous Duplex Bilateral    Narrative    EXAM: US LOWER EXTREMITY VENOUS DUPLEX BILATERAL  LOCATION: [facility]  DATE/TIME: 2/3/2023 5:01 PM    INDICATION: elevated d dimer   rule out DVT  COMPARISON: None.  TECHNIQUE: Venous Duplex ultrasound of bilateral lower extremities with and without compression, augmentation and duplex. Color flow and spectral Doppler with waveform analysis performed.    FINDINGS: Exam includes the common femoral, femoral, popliteal veins as well as segmentally visualized deep calf veins and greater saphenous vein.     RIGHT: No deep vein thrombosis. No superficial thrombophlebitis. No popliteal cyst.    LEFT: No deep vein thrombosis. No superficial thrombophlebitis. No popliteal cyst.      Impression    IMPRESSION:  1.  No deep venous thrombosis in the bilateral lower extremities.

## 2023-02-04 NOTE — PROGRESS NOTES
Received call from Dr. Rangel. MD states that patients with chronic suh are usually seen in the office as outpatient. If there is an urgent need that patient needs to be seen in hospital MD will be more than happy to see patient. Nursing to call service if this is needed. Writer will inform Dr. Conn.

## 2023-02-04 NOTE — PROGRESS NOTES
Pt A/O x4, forgetful. VSS on RA. PIV SL, on abx zosyn q6. Henderson in place for retention, no BM this shift. Pt denies N/V, SOB, but does have some mild gas pain. Troponin levels high, hospitalist aware, recheck in AM.

## 2023-02-04 NOTE — PROGRESS NOTES
Notified provider about indwelling suh catheter discussed removal or continued need.    Did provider choose to remove indwelling suh catheter? No    Provider's suh indication for keeping indwelling suh catheter: Retention.    Is there an order for indwelling suh catheter? Yes    *If there is a plan to keep suh catheter in place at discharge daily notification with provider is not necessary, but please add a notation in the treatment team sticky note that the patient will be discharging with the catheter.

## 2023-02-04 NOTE — PROVIDER NOTIFICATION
MD Notification    Notified Person: MD    Notified Person Name: Caleb    Notification Date/Time: 2/4/23 0116    Notification Interaction: Text Page    Purpose of Notification: [id number] JL    Critical troponin of 106.  Thanks Richard MICHELLE RN *[id number]    Orders Received:    Comments:

## 2023-02-05 LAB
ANION GAP SERPL CALCULATED.3IONS-SCNC: 14 MMOL/L (ref 7–15)
BACTERIA UR CULT: NO GROWTH
BUN SERPL-MCNC: 33 MG/DL (ref 8–23)
CALCIUM SERPL-MCNC: 8.2 MG/DL (ref 8.8–10.2)
CHLORIDE SERPL-SCNC: 102 MMOL/L (ref 98–107)
CREAT SERPL-MCNC: 1.7 MG/DL (ref 0.67–1.17)
DEPRECATED HCO3 PLAS-SCNC: 19 MMOL/L (ref 22–29)
ERYTHROCYTE [DISTWIDTH] IN BLOOD BY AUTOMATED COUNT: 16.2 % (ref 10–15)
GFR SERPL CREATININE-BSD FRML MDRD: 42 ML/MIN/1.73M2
GLUCOSE BLDC GLUCOMTR-MCNC: 106 MG/DL (ref 70–99)
GLUCOSE BLDC GLUCOMTR-MCNC: 109 MG/DL (ref 70–99)
GLUCOSE BLDC GLUCOMTR-MCNC: 113 MG/DL (ref 70–99)
GLUCOSE BLDC GLUCOMTR-MCNC: 131 MG/DL (ref 70–99)
GLUCOSE BLDC GLUCOMTR-MCNC: 69 MG/DL (ref 70–99)
GLUCOSE SERPL-MCNC: 79 MG/DL (ref 70–99)
HCT VFR BLD AUTO: 28.8 % (ref 40–53)
HGB BLD-MCNC: 9.5 G/DL (ref 13.3–17.7)
LACTATE SERPL-SCNC: 0.9 MMOL/L (ref 0.7–2)
MCH RBC QN AUTO: 28.4 PG (ref 26.5–33)
MCHC RBC AUTO-ENTMCNC: 33 G/DL (ref 31.5–36.5)
MCV RBC AUTO: 86 FL (ref 78–100)
PLATELET # BLD AUTO: 540 10E3/UL (ref 150–450)
POTASSIUM SERPL-SCNC: 2.8 MMOL/L (ref 3.4–5.3)
RBC # BLD AUTO: 3.34 10E6/UL (ref 4.4–5.9)
SODIUM SERPL-SCNC: 135 MMOL/L (ref 136–145)
WBC # BLD AUTO: 13.8 10E3/UL (ref 4–11)

## 2023-02-05 PROCEDURE — 83605 ASSAY OF LACTIC ACID: CPT | Performed by: STUDENT IN AN ORGANIZED HEALTH CARE EDUCATION/TRAINING PROGRAM

## 2023-02-05 PROCEDURE — 250N000011 HC RX IP 250 OP 636: Performed by: HOSPITALIST

## 2023-02-05 PROCEDURE — 36415 COLL VENOUS BLD VENIPUNCTURE: CPT | Performed by: STUDENT IN AN ORGANIZED HEALTH CARE EDUCATION/TRAINING PROGRAM

## 2023-02-05 PROCEDURE — 250N000013 HC RX MED GY IP 250 OP 250 PS 637: Performed by: HOSPITALIST

## 2023-02-05 PROCEDURE — 250N000013 HC RX MED GY IP 250 OP 250 PS 637: Performed by: INTERNAL MEDICINE

## 2023-02-05 PROCEDURE — 250N000009 HC RX 250: Performed by: INTERNAL MEDICINE

## 2023-02-05 PROCEDURE — 99233 SBSQ HOSP IP/OBS HIGH 50: CPT | Performed by: INTERNAL MEDICINE

## 2023-02-05 PROCEDURE — 258N000003 HC RX IP 258 OP 636: Performed by: INTERNAL MEDICINE

## 2023-02-05 PROCEDURE — 99222 1ST HOSP IP/OBS MODERATE 55: CPT | Performed by: INTERNAL MEDICINE

## 2023-02-05 PROCEDURE — 258N000002 HC RX IP 258 OP 250: Performed by: INTERNAL MEDICINE

## 2023-02-05 PROCEDURE — 80048 BASIC METABOLIC PNL TOTAL CA: CPT | Performed by: INTERNAL MEDICINE

## 2023-02-05 PROCEDURE — 85027 COMPLETE CBC AUTOMATED: CPT | Performed by: INTERNAL MEDICINE

## 2023-02-05 PROCEDURE — 36415 COLL VENOUS BLD VENIPUNCTURE: CPT | Performed by: INTERNAL MEDICINE

## 2023-02-05 PROCEDURE — 120N000001 HC R&B MED SURG/OB

## 2023-02-05 RX ORDER — PIPERACILLIN SODIUM, TAZOBACTAM SODIUM 2; .25 G/10ML; G/10ML
2.25 INJECTION, POWDER, LYOPHILIZED, FOR SOLUTION INTRAVENOUS EVERY 6 HOURS
Status: DISCONTINUED | OUTPATIENT
Start: 2023-02-05 | End: 2023-02-06

## 2023-02-05 RX ORDER — POTASSIUM CHLORIDE 1500 MG/1
40 TABLET, EXTENDED RELEASE ORAL 2 TIMES DAILY
Status: COMPLETED | OUTPATIENT
Start: 2023-02-05 | End: 2023-02-05

## 2023-02-05 RX ADMIN — TAMSULOSIN HYDROCHLORIDE 0.4 MG: 0.4 CAPSULE ORAL at 08:53

## 2023-02-05 RX ADMIN — SODIUM CHLORIDE: 9 INJECTION, SOLUTION INTRAVENOUS at 04:58

## 2023-02-05 RX ADMIN — PIPERACILLIN AND TAZOBACTAM 3.38 G: 3; .375 INJECTION, POWDER, FOR SOLUTION INTRAVENOUS at 17:36

## 2023-02-05 RX ADMIN — SENNOSIDES AND DOCUSATE SODIUM 2 TABLET: 50; 8.6 TABLET ORAL at 08:53

## 2023-02-05 RX ADMIN — POTASSIUM CHLORIDE 40 MEQ: 1500 TABLET, EXTENDED RELEASE ORAL at 20:11

## 2023-02-05 RX ADMIN — HEPARIN SODIUM 5000 UNITS: 5000 INJECTION, SOLUTION INTRAVENOUS; SUBCUTANEOUS at 08:55

## 2023-02-05 RX ADMIN — PANTOPRAZOLE SODIUM 40 MG: 40 TABLET, DELAYED RELEASE ORAL at 08:52

## 2023-02-05 RX ADMIN — LEVOTHYROXINE SODIUM 50 MCG: 50 TABLET ORAL at 08:53

## 2023-02-05 RX ADMIN — PIPERACILLIN AND TAZOBACTAM 3.38 G: 3; .375 INJECTION, POWDER, FOR SOLUTION INTRAVENOUS at 04:56

## 2023-02-05 RX ADMIN — PIPERACILLIN AND TAZOBACTAM 3.38 G: 3; .375 INJECTION, POWDER, FOR SOLUTION INTRAVENOUS at 11:16

## 2023-02-05 RX ADMIN — INSULIN GLARGINE 10 UNITS: 100 INJECTION, SOLUTION SUBCUTANEOUS at 08:56

## 2023-02-05 RX ADMIN — ASPIRIN 81 MG: 81 TABLET, COATED ORAL at 08:53

## 2023-02-05 RX ADMIN — PIPERACILLIN AND TAZOBACTAM 2.25 G: 2; .25 INJECTION, POWDER, FOR SOLUTION INTRAVENOUS at 23:03

## 2023-02-05 RX ADMIN — POTASSIUM CHLORIDE 40 MEQ: 1500 TABLET, EXTENDED RELEASE ORAL at 14:12

## 2023-02-05 RX ADMIN — POLYETHYLENE GLYCOL 3350 17 G: 17 POWDER, FOR SOLUTION ORAL at 08:51

## 2023-02-05 RX ADMIN — SODIUM BICARBONATE: 84 INJECTION, SOLUTION INTRAVENOUS at 15:09

## 2023-02-05 RX ADMIN — HEPARIN SODIUM 5000 UNITS: 5000 INJECTION, SOLUTION INTRAVENOUS; SUBCUTANEOUS at 20:13

## 2023-02-05 ASSESSMENT — ACTIVITIES OF DAILY LIVING (ADL)
ADLS_ACUITY_SCORE: 45
ADLS_ACUITY_SCORE: 45
ADLS_ACUITY_SCORE: 47
ADLS_ACUITY_SCORE: 43
ADLS_ACUITY_SCORE: 47
ADLS_ACUITY_SCORE: 45
ADLS_ACUITY_SCORE: 47
ADLS_ACUITY_SCORE: 47
ADLS_ACUITY_SCORE: 45
ADLS_ACUITY_SCORE: 47
ADLS_ACUITY_SCORE: 45
ADLS_ACUITY_SCORE: 45

## 2023-02-05 NOTE — PLAN OF CARE
Goal Outcome Evaluation:    Admitting Diagnosis: Hyponatremia   Generalized muscle weakness,  Acute cystitis without hematuria  Acute respiratory failure with hypoxia  Community acquired pneumonia, unspecified laterality    Patient A&Ox4. VSS. On Tel with sinus dysthymia  Sepsis fire not sure why, Vital signs are normal, Lactic  lab results 2.1 MD notified, NS Bolus order and administered per MD  Lab redrawn at 2100.  NS infusing @ 100 mL/hr at be contune after the Bolus. . Regale diet. Assist of !x GB/W. 2 PIV in place on the L arm. BS check 108, 99. Suppository given per MD order for abdominla discomfort, with effective results of multiple large loose stools. Henderson in place.

## 2023-02-05 NOTE — PROGRESS NOTES
"GASTROENTEROLOGY PROGRESS NOTE       SUBJECTIVE:  Had a large, black bowel movement. No nausea or vomiting.     OBJECTIVE:  BP 96/60 (BP Location: Right arm)   Pulse 95   Temp 97.9  F (36.6  C) (Oral)   Resp 16   Ht 1.702 m (5' 7\")   Wt 53 kg (116 lb 13.5 oz)   SpO2 99%   BMI 18.30 kg/m    Temp (24hrs), Av.9  F (36.6  C), Min:97.6  F (36.4  C), Max:98.2  F (36.8  C)    Patient Vitals for the past 72 hrs:   Weight   23 0617 53 kg (116 lb 13.5 oz)   23 0910 52 kg (114 lb 10.2 oz)       Intake/Output Summary (Last 24 hours) at 2023 1425  Last data filed at 2023 0632  Gross per 24 hour   Intake --   Output 1850 ml   Net -1850 ml        PHYSICAL EXAM     Cardiovascular: Normal  Respiratory: Normal  Gastrointestinal: Active BS, soft, NT/ND        Recent Labs   Lab Test 23  0907 23  0739 23  2344 23  0917 10/22/22  1243 22  1029   WBC 13.8* 13.8*  --  17.4*   < > 19.2*   HGB 9.5* 8.4* 9.0* 10.0*   < > 9.6*   MCV 86 84  --  85   < > 88   * 505*  --  556*   < > 620*   INR  --   --   --   --   --  1.20*    < > = values in this interval not displayed.     Recent Labs   Lab Test 23  0907 23  0739 23  0917   POTASSIUM 2.8* 3.6 4.0   CHLORIDE 102 93* 90*   CO2 19* 20* 25   BUN 33.0* 37.2* 30.6*   ANIONGAP 14 12 9     Recent Labs   Lab Test 23  1311 23  0917 23  2150 23  2100 10/22/22  1244 22  1343 22  1029   ALBUMIN  --  3.0*  --  2.3*  --   --  2.1*   BILITOTAL  --  0.3  --  1.1  --   --  0.7   ALT  --  15  --  74*  --   --  37   AST  --  23  --  61*  --   --  38   PROTEIN 70*  --  100*  --  200*   < >  --    LIPASE  --  13  --  64*  --   --   --     < > = values in this interval not displayed.           Principal Problem:  Black stool    Assessment: Hemoglobin stable. Suspect iron is the reason for the dark stool.     Plan: Continue to follow hemoglobin and stool color off iron.      Eron Morales, " MD  [facility]  Office:  [phone]

## 2023-02-05 NOTE — PLAN OF CARE
Goal Outcome Evaluation:    Admitting Diagnosis: Hyponatremia.   Generalized muscle weakness,  Acute cystitis without hematuria,  Acute respiratory failure with hypoxia,  Community acquired pneumonia, unspecified laterality.     Patient A&Ox4. VSS. On Tel with sinus dysthymia. BS 69, 113 rechecked, 109,106. Multiple Loose stools, MD paged with on intervention. K+ 2.8 replacement given per MD order, Sodium Bicarbonate 75 mEq in NaCl 0.45% 1000 mL started on this shift, infusing at 100 mL/hr. Assist of 1-2 GB/W. Incontinent of BM, Henderson I place and patent. Denied pain, c/o of mild discomfort in the rectal area due to multiple stooling.

## 2023-02-05 NOTE — CONSULTS
UROLOGY CONSULTATION:    PATIENT: South HERNANDEZ (1950)  AGE: 72 year old year old male    Primary Care Provider / Referring Physician:  Mallory Davila    CHIEF COMPLAINT:  UTI/hydro    HPI:   Known to our service; seeing a Urologist on 23 for neurogenic bladder.     Patient is admitted for GI bleeding; known Klebsiella pneumonia UTI with associated bacteremia, infectious disease followed.  Was treated with 7 days of IV ceftriaxone, transition to 7 days of oral Ceftin on discharge.  Was also noted to have STEVE due to urinary retention that improved with Henderson catheter placement and treatment of sepsis.  Noted to have urinary retention with hydronephrosis, followed by [facility].  Patient was transition to TCU for rehabilitation. This time he presents for GI bleeding.     He is managed by his primary Urologist: Crow Reis MD ([facility])   I reviewed his CT  2/3/2 images (limited ) but showed no severe hydro        Past Medical History:   Diagnosis Date     Anemia, iron deficiency      Other and unspecified hyperlipidemia      Shingles      Type II or unspecified type diabetes mellitus without mention of complication, not stated as uncontrolled     diet controlled     Past Surgical History:   Procedure Laterality Date     CATARACT IOL, RT/LT      bilaterally     PAST SOCIAL HISTORY  Social History     Social History Narrative    ** Merged History Encounter **         Dairy/d 2-4 servings/d.     Caffeine 3-4 servings/d    Exercise 2-3 x week    Sunscreen used - Yes    Seatbelts used - Yes    Working smoke/CO detectors in the home - Yes    Guns stored in the home - Yes    Self Breast Exams - NA    Self Testicular Exam - Yes    Eye Exam up to date - Yes    Dental Exam up to date - Yes    Pap Smear up to date - NA    Mammogram up to date - NA    PSA up to date - been a couple of years    Dexa Scan up to date - NA    Flex Sig / Colonoscopy up to date - >2-3 years normal     "Immunizations up to date - less than 10 years but not sure when    Abuse: Current or Past(Physical, Sexual or Emotional)- No    Do you feel safe in your environment - Yes                      No current outpatient medications on file.              No Known Allergies    PHYSICAL EXAM:          BP 96/60 (BP Location: Right arm)   Pulse 95   Temp 97.9  F (36.6  C) (Oral)   Resp 16   Ht 1.702 m (5' 7\")   Wt 53 kg (116 lb 13.5 oz)   SpO2 99%   BMI 18.30 kg/m           General appearance shows no deformaties and good grooming.    Abdomen: soft. Non distended     LABS:   PSA   Date Value Ref Range Status   06/26/2006 3.64 0 - 4 ug/L Final     UA RESULTS:  Recent Labs   Lab Test 02/03/23  1311   COLOR Light Yellow   APPEARANCE Slightly Cloudy*   URINEGLC 30*   URINEBILI Negative   URINEKETONE Negative   SG 1.015   UBLD Moderate*   URINEPH 5.5   PROTEIN 70*   NITRITE Negative   LEUKEST Large*   RBCU 28*   WBCU >182*     Creatinine   Date Value Ref Range Status   02/05/2023 1.70 (H) 0.67 - 1.17 mg/dL Final   06/26/2006 1.00 0.80 - 1.50 mg/dL Final   ]  Hemoglobin   Date Value Ref Range Status   02/05/2023 9.5 (L) 13.3 - 17.7 g/dL Final   ]      ASSESSMENT:   1. Acute respiratory failure with hypoxia (H)    2. Community acquired pneumonia, unspecified laterality    3. Acute cystitis without hematuria    4. Hyponatremia    5. Generalized muscle weakness          PLAN:    HE is schedule to follow-up with His urologist at [facility] on 2/7/23; would reschedule in 2-3 weeks   Treat UTI/ Keep Henderson/no surgical interventional needed at this time         Saurav Doyle MD  "

## 2023-02-05 NOTE — PROGRESS NOTES
Pt A/O x4. VSS on RA. A/1 GB/W. PIV infusing 100 ml/hr NS. Regular diet. BG check at bedtime 54, was given a snack, recheck was 100. On tele-sinus dysrhythmia. Henderson patent. Incontinent loose dark stool x1. Lactic redraw WNL. No c/o pain, SOB, or chest pain.

## 2023-02-05 NOTE — PROGRESS NOTES
[facility]    Hospitalist Progress Note    Assessment & Plan   South Tovar is a 72 year old male with PMHx of chronic urinary retention with chronic suh, recurrent UTIs, BPH, chronic iron deficiency anemia and DM II who was admitted on 2/3/2023 for evaluation of dark-colored emesis concerning for GI bleed.     He was previously admitted at [facility] from 10/22/22-10/24/22 with complicated UTI (CAUTI) dt candida in setting of uncontrolled DM. ID recommended 2 weeks of fluconazole which he completed.     More recently, he was admitted at [facility] from 1/1123 - 1/18/23 for Klebsiella pneumonia UTI with associated bacteremia. Seen by ID that stay, was treated with 7 days of IV ceftriaxone and discharge on an additional 7 days of oral Ceftin. That hospital stay was also complicated by STEVE dt urinary retention that improved with Suh catheter placement and treatment of sepsis.  Was noted to have urinary retention with hydronephrosis, followed by [facility]. Ultimately discharged to TCU for ongoing therapy. Staff at his TCU noted dark colored emesis and patient was brought to ED for evaluation.      Dark-colored emesis, concern for GI bleed: Resolved  Chronic iron deficiency anemia  Cecal polyp  * On iron supplement for hx of iron deficiency anemia. Baseline hgb is 8-9, had been 10.4 per labs on 1/30/23.   * Last OP EGD/colonoscopy in 11/2022 per [facility] were notable for polyps in the colon and also in the small intestine. Had been referred to a surgeon with recommendation for possible resection of part of his small intestine. No further details of the endoscopy results or the recommended care plan at present.   * On the morning of admission, patient had received multiple medications on an empty stomach. Reported he did not like the way the iron tablet felt making him burp with stomach upset and dark-colored emesis.  * In ED, was afebrile, mildly tachycardic but BPs stable. Hgb 10.0, WBC 17.4. Na  122. Protonix 40mg IV daily started on admission.  * [facility] consulted and following this stay. Recommended to cont PPI. Defer EGD for now with stable hgb. Will need to follow up with surgeon as previously advised.   -- no recurrence of emesis this stay and serial hgbs stable at 8-9 which is his baseline  -- cont Protonix 40mg po daily  -- holding oral iron for now, consider IV iron this stay -- will transition to ferrous gluconate at discharge (fewer GI side effects)  -- was due to follow up with [facility] colorectal surgery on 2/6/23 (Dr. Micheal Pink), will need to reschedule if patient still hospitalized    Suspected aspiration pneumonitis vs aspiration pneumonia  * Had episode of emesis on 2/3 AM after taking meds. No reported cp/sob/cough, fevers/chills.  * In ED, O2 sats briefly 88% on RA but quickly improved to 90s on RA. Briefly tachycardic. Viral swabs neg. CXR neg for acute infiltrate. CTA chest was neg for PE, did mention patchy predominantly groundglass opacities in both mid and lower lungs, ddx includes infectious vs inflammatory etiology. Also noted to have trace bilateral pleural effusions. ProBNP neg. WBC 17. Procal 0.16 (low risk). Given ceftriaxone and azithromycin in the ED. Changed to Zosyn on admission.   -- cont Zosyn for now given suspected concurrent UTI  -- O2 sats stable on RA  -- pulmonary toilet with IS/OOB as able     Recurrent, complicated, catheter associated urinary tract infection  Hx of UTIs, most recently dt klebsiella pneumoniae with associated klebsiella bacteremia in 1/2023  * As above, recently completed course of abx. No reported urinary complaints on presentation. Has chronic suh. Due to follow up with urology on 2/7/23.   * In ED, was afebrile. Mildly tachycardic. WBC 17 with mildly elevated procal UA abnl with large leukocyte esterase, greater than 182 WBCs. Lactate nl. Started on abx as above.   * [facility] consulted on admission dt concern for recurrent UTIs -- no  specific concerns, recommended follow up in clinic  -- urine and blood cultures drawn on admission remain neg; WBC improving  -- cont Zosyn for now but if cultures remain neg can stop abx on 2/6    STEVE, with developing NAGMA  Chronic urinary retention with Suh catheter   BPH  Hx of STEVE dt urinary retention, improved with suh catheter placement, no baseline CKD  * Baseline Cr is around 1. Cr had been as high as 3 during recent hospital stay, normalized after placement of suh. Discharged with suh in place.   * Suh catheter exchanged in ED. Cr stable on admission.  * [facility] consulted this stay, no new recs per urology's phone dicsussion with RN on 2/4. Advised to follow up with OP urology after discharge.   -- suh draining without concerns this stay  -- Cr trending up this stay despite IVFs: 1.1 -- 1.5 -- 1.7 on 2/5, did receive contrast with CT PE study on admission  -- cont IVFs today, will ask nephrology to evaluate  -- cont Flomax     Troponin elevated, likely in the setting of demand  Elevated D-dimer, no DVT/PE  * As above, was tachycardic on admission with HRs 100s. No cp/palpitations or shortness of breath.   * Incidentally found to have elevated trops this stay (HS trop 84 -- 88 on recheck). EKG showed NSR w/o acute ischemic changes. Noted to have elevated d-dimer. CT chest neg for PE.  but bilateral LE US was neg for DVT. ASA 81mg daily started on admission.   * Serial trops trended up (now 106 -- 125) but in absence of chest pain and worsening renal function make situation difficult to interpret.   * Echo obtained (suboptimal images dt technically difficult study) and showed EF 55-60% with no WMAs, grade I diastolic dysfunction; no over valve issues .  -- cont ASA 81mg daily for now  -- cont telemetry for now  -- mgmt of above medical issues     Hypochloremic hyponatremia likely from vomiting, poor intake: Resolved  * Na 124 and Cl 90 on presentation with. Started on IVFs this stay.  * Na  subsequently improved  -- encourage po intake    Hypokalemia  * K 2.8 on 2/5.   -- defer replacement protocol for now given worsening renal function  -- have ordered KCl 40mg po x2 doses today    Constipation, dt oral iron  * CT on admission was notable for a moderate to large amount of gas and stool in the visualized portion of the colon suggestive of constipation.   * Oral iron held this stay and was placed on bowel regimen  * Had good BM on 2/5.   -- cont bowel regimen     DM II, on insulin  * A1c 6.6 in 10/2022 --> 6.6 on 2/3/23.  * PTA meds: Lantus 25U daily (had only been taking 10U daily), metformin and linagliptin   -- conts on Lantus 10U daily plus sliding scale insulin  -- had isolated episode of hypoglycemia on 2/5 AM with BG 69 which resolved -- monitor for recurrence, if low BG persists will need to scale back on insulin dosing  -- holding other meds    Recent Labs   Lab 02/05/23  1219 02/05/23  0930 02/05/23  0907 02/05/23  0800 02/04/23  2215 02/04/23  2125   * 113* 79 69* 100* 54*       Hypothyroidism  * Recent labs showed TSH 14.8, FT4 nl but in setting of recent acute illness  * Chronic and stable on levothyroxine.   -- repeat thyroid studies in 10 wks     Physical deconditioning dt medical illness, sternal fragility  Recent fall with left temporal laceration, sutures removed on 1/16/23  * Had been at TCU following recent hospitalization. Noted concerns for cognitive impairment/encephalopathy.    * Oriented on admission this stay  -- anticipate return to TCU at discharge    FEN: cont IVFs today while awaiting input from nephrolgoy lytes stable, cardiac diet  DVT Prophylaxis: subQ heparin, PCDs  Code Status: Full Code    Disposition: Pending stable labs, culture data -- suspect 2d still. Anticipate discharge back to TCU. SW following.    Courtney Conn, DO    Medical Decision Making       -------------------------- BILLING ON TIME  ------------------------------------------------------------------------------------------------------------  55 MINUTES SPENT BY ME on the date of service doing chart review, history, exam, documentation & further activities per the note.         Interval History    Chart reviewed, overnight events noted. Sepsis BPA fired and lactate mildly elevated at 2.1. No other changes in condition. Given additional 500ml fluid bolus and lactate normalized. Had episode of hypoglycemia this AM with BG 60s. Seen this afternoon. Feeling well. Had good BM earlier today. Henderson draining clear yellow urine. Taking some po. No recurrent episodes of nausea/vomiting this stay. No cp/sob/cough. Hoping to be able to discharge back to TCU soon.    -Data reviewed today: I reviewed all new labs and imaging results over the last 24 hours. I personally reviewed no images or EKG's today.    Physical Exam   Temp: 97.9  F (36.6  C) Temp src: Oral BP: 96/60 Pulse: 95   Resp: 16 SpO2: 99 % O2 Device: None (Room air)    Vitals:    02/03/23 0910 02/04/23 0617   Weight: 52 kg (114 lb 10.2 oz) 53 kg (116 lb 13.5 oz)     Vital Signs with Ranges  Temp:  [97.6  F (36.4  C)-98.2  F (36.8  C)] 97.9  F (36.6  C)  Pulse:  [74-95] 95  Resp:  [16] 16  BP: ()/(53-60) 96/60  SpO2:  [97 %-99 %] 99 %  I/O last 3 completed shifts:  In: 0   Out: 1850 [Urine:1850]    Constitutional: Frail appearing male, resting comfortably, alert and answering basic questions appropriately, NAD  Respiratory: CTAB, no wheeze/rales/rhonchi, no increased work of breathing  Cardiovascular: HRRR, no MGR, no LE edema  GI: S, NT, ND, +BS  Skin/Integumen: warm/dry, previously noted forehead laceration is healing well  Other: Henderson draining clear yellow urine    Medications     sodium chloride 100 mL/hr at 02/05/23 0458       aspirin  81 mg Oral Daily     heparin ANTICOAGULANT  5,000 Units Subcutaneous BID     insulin aspart  1-7 Units Subcutaneous TID AC     insulin aspart  1-5 Units  Subcutaneous At Bedtime     insulin glargine  10 Units Subcutaneous QAM     levothyroxine  50 mcg Oral QAM AC     pantoprazole  40 mg Oral QAM AC     piperacillin-tazobactam  3.375 g Intravenous Q6H     polyethylene glycol  17 g Oral Daily     senna-docusate  2 tablet Oral BID     sodium chloride (PF)  3 mL Intracatheter Q8H     sodium chloride (PF)  3 mL Intracatheter Q8H     tamsulosin  0.4 mg Oral Daily       Data   Recent Labs   Lab 02/05/23  1219 02/05/23  0930 02/05/23  0907 02/04/23  0853 02/04/23  0739 02/03/23  2344 02/03/23  1544 02/03/23  0917   WBC  --   --  13.8*  --  13.8*  --   --  17.4*   HGB  --   --  9.5*  --  8.4* 9.0*  --  10.0*   MCV  --   --  86  --  84  --   --  85   PLT  --   --  540*  --  505*  --   --  556*   NA  --   --  135*  --  125* 126*   < > 124*   POTASSIUM  --   --  2.8*  --  3.6  --   --  4.0   CHLORIDE  --   --  102  --  93*  --   --  90*   CO2  --   --  19*  --  20*  --   --  25   BUN  --   --  33.0*  --  37.2*  --   --  30.6*   CR  --   --  1.70*  --  1.52*  --   --  1.14   ANIONGAP  --   --  14  --  12  --   --  9   MARNI  --   --  8.2*  --  8.4*  --   --  8.9   * 113* 79   < > 100*  --    < > 182*   ALBUMIN  --   --   --   --   --   --   --  3.0*   PROTTOTAL  --   --   --   --   --   --   --  7.0   BILITOTAL  --   --   --   --   --   --   --  0.3   ALKPHOS  --   --   --   --   --   --   --  90   ALT  --   --   --   --   --   --   --  15   AST  --   --   --   --   --   --   --  23   LIPASE  --   --   --   --   --   --   --  13    < > = values in this interval not displayed.       No results found for this or any previous visit (from the past 24 hour(s)).

## 2023-02-05 NOTE — CONSULTS
Care Management Initial Consult    General Information  Assessment completed with: Patient,    Type of CM/SW Visit: Initial Assessment    Primary Care Provider verified and updated as needed:     Readmission within the last 30 days: previous discharge plan unsuccessful      Reason for Consult: discharge planning  Advance Care Planning: Advance Care Planning Reviewed: no concerns identified          Communication Assessment  Patient's communication style: spoken language (English or Bilingual)             Cognitive  Cognitive/Neuro/Behavioral: WDL  Level of Consciousness: alert  Arousal Level: opens eyes spontaneously  Orientation: oriented x 4     Best Language: 0 - No aphasia  Speech: clear, spontaneous    Living Environment:   People in home: facility resident (admitted directly from [facility] TCU)     Current living Arrangements:        Able to return to prior arrangements: other (see comments) (Need to confirm with [facility] that pt can return)       Family/Social Support:  Care provided by: other (see comments) (TCU staff)  Provides care for: no one, unable/limited ability to care for self  Marital Status:   Wife          Description of Support System: Supportive, Involved    Support Assessment: Adequate family and caregiver support, Adequate social supports    Current Resources:   Patient receiving home care services:       Community Resources:    Equipment currently used at home:    Supplies currently used at home:      Employment/Financial:  Employment Status:          Financial Concerns:             Lifestyle & Psychosocial Needs:  Social Determinants of Health     Tobacco Use: Medium Risk     Smoking Tobacco Use: Former     Smokeless Tobacco Use: Never     Passive Exposure: Not on file   Alcohol Use: Not on file   Financial Resource Strain: Not on file   Food Insecurity: Not on file   Transportation Needs: Not on file   Physical Activity: Not on file   Stress: Not on file   Social Connections: Not on file    Intimate Partner Violence: Not on file   Depression: Not on file   Housing Stability: Not on file       Functional Status:  Prior to admission patient needed assistance:              Mental Health Status:          Chemical Dependency Status:                Values/Beliefs:  Spiritual, Cultural Beliefs, Baptism Practices, Values that affect care:  (Jeff)               Additional Information:    Pt had two recent hospitalization stays with the most recent occurring between 1/11/23-1/18/23, where he discharged to [facility] TCU.  Pt has a past medical history of recurrent UTIs, chronic urinary retention on folery catheter, BPH, iron deficiency, enemia, diabetes mellitus.  Pt admitted back to [facility] from TCU on 2/3/23 for acute resp failure with hypoxia, community acquired pneumonia, acute cystitis without hematuria, hyponatremia, and generalized muscle weakness.  Sw consult ordered for discharge planning.    Sw met with pt to discuss discharge planning and recent TCU stay.  Pt confirmed that he admitted directly from [facility] TCU to [facility] with this most recent hospital stay.  Pt stated that he believes his bed is being held at [facility] and would like to return back to this TCU once ready for discharge.  Pt stated that he will likely need medical transport at discharge and is aware this is private pay.  Following this conversation Sw called [facility] admissions and left vm inquiring into pt returning back to TCU.  Sw sent referral to [facility] and will continue to follow.      MYLENE Zuñiga

## 2023-02-05 NOTE — PROVIDER NOTIFICATION
MD Notification    Notified Person: MD    Notified Person Name: Dr. Ravi    Notification Date/Time: 6:29 PM 2/4/23      Notification Interaction: Amcom message    Purpose of Notification: Lactic 2.1    Orders Received:    Comments:

## 2023-02-05 NOTE — PROGRESS NOTES
Paged for Lactic acid level of 2.1. Pt is already on IV zosyn and crystalloid IV running at 100 m//hr.   LA level yesterday 1.6.   Normal saline 250 ml bolus ordered. LA will be trended   D/w RN

## 2023-02-05 NOTE — PROVIDER NOTIFICATION
MD Notification    Notified Person: MD    Notified Person Name: Courtney Conn    Notification Date/Time:1300 2/5/2023    Notification Interaction: Vocera message    Purpose of Notification: Low K+ 2.8    Orders Received: Will order placement

## 2023-02-05 NOTE — CONSULTS
[facility]    Nephrology Consultation     Date of Admission:  2/3/2023    Assessment & Plan     South Tovar is a 72 year old male who was admitted on 2/3/2023.     Assessment:  1) Acute Kidney Injury, non-oliguric    Admitted at baseline, creatinine 1.14 gfr in upper 60's. Subsequent rise  To 1.52 and today 1.70. Non-0liguric with 1200cc urine yesterday and so far today 850.     Llikely STEVE from contrast nephrotoxicity although can't rule out some additional element of ischemic related injury with some BP's as low as 88 systolic with age and DM less likely with no prior HTN and baseline BP's in 100-120 range.    Tubular injury done with contrast exposure, important to have going forward adequate renal perfusion, avoid nephrotoxins as able going forward.    Appearing euvolemic, currently on NS at 100ml/hr    2) Hx b/l hydronephrosis, known prostatomegaly    STEVE earlier this month attributed to post-renal etiology. Had urinary retention and s/p suh catheter placement. Was discharged with suh in place. Noted exchanged here in ED. Urology consulted.    3) FEN:    Hyponatremia, 135 today, imroved from 125-126.    Noted hypokalemia, getting replacement today.    Mild acidosis, bicarb 19    Other:  Pneumonia: on zosyn  Anemia: 2/3 20% iron sats, Hgb 9.5 today, stable    Plan/Recs:  1) agree with potassium replacement  2) avoidance as able nsaids, contrast, nephrotoxins  3) I/O's, daily BMP's  4) stopping current NS, changing to 1/2  NS with 75meq sodium bicarbonate and decreasing rate to 75ml/hr    Expect we will see creatinine plateau and start to improving in next few days.    Brent Franz,   [facility] - Nephrology  [phone]  --------------------------------------------------------------------------------------------  Reason for Consult     I was asked to see the patient for acute kidney injury.    History is obtained from the patient and chart review.      History of  "Present Illness     South Tovar is a 72 year old male who presented 2/3 with dark colored emesis, admitted to evaluate for potential GI bleeding. Received CT of chest with contrast to evaluate for pulmonary emboli.  Had patchy opacities in mid and lower lungs, subsequently treated for pneumonia.    Pt reports main issue was some indigestion and \"acid problems\", started receiving some tums at rehab facility. Denies any new rashes, myelagias, arthralgias. Today no dyspnea and no edema anywhere he can feel. Still with suh catheter present nad getting isotonic NS for fluids.     Past Medical History   I have reviewed this patient's medical history and updated it with pertinent information if needed.   Past Medical History:   Diagnosis Date     Anemia, iron deficiency      Other and unspecified hyperlipidemia      Shingles 2013     Type II or unspecified type diabetes mellitus without mention of complication, not stated as uncontrolled     diet controlled       Past Surgical History   I have reviewed this patient's surgical history and updated it with pertinent information if needed.  Past Surgical History:   Procedure Laterality Date     CATARACT IOL, RT/LT  1997    bilaterally       Prior to Admission Medications   Prior to Admission Medications   Prescriptions Last Dose Informant Patient Reported? Taking?   ONE TOUCH ULTRA TEST VI STRP  Nursing Home No No   Sig: as directed    ferrous sulfate (FEROSUL) 325 (65 Fe) MG tablet 2/3/2023 at am Nursing Home Yes Yes   Sig: Take 325 mg by mouth daily   insulin glargine (LANTUS PEN) 100 UNIT/ML pen 2/3/2023 at am Nursing Home No Yes   Sig: Inject 25 Units Subcutaneous every morning   Patient taking differently: Inject 10 Units Subcutaneous every morning   levothyroxine (SYNTHROID/LEVOTHROID) 50 MCG tablet Unknown Nursing Home Yes Yes   Sig: Take 50 mcg by mouth daily   linagliptin (TRADJENTA) 5 MG TABS tablet 2/3/2023 at am Nursing Home Yes Yes   Sig: Take 5 mg by mouth " daily   metFORMIN (GLUCOPHAGE XR) 500 MG 24 hr tablet 2/3/2023 at x1 (1000mg) Nursing Home Yes Yes   Sig: Give 1000mg in the morning and 500mg in th evening, with meals.   polyethylene glycol (MIRALAX) 17 g packet 2/3/2023 at am Nursing Home Yes Yes   Sig: Take 1 packet by mouth daily   senna-docusate (SENOKOT-S/PERICOLACE) 8.6-50 MG tablet 2/3/2023 at x1 Nursing Home Yes Yes   Sig: Take 1 tablet by mouth 2 times daily . Hold for loose stools.   tamsulosin (FLOMAX) 0.4 MG capsule 2/3/2023 at am Nursing Home No Yes   Sig: Take 1 capsule (0.4 mg) by mouth daily      Facility-Administered Medications: None     Allergies   No Known Allergies    Social History   I have reviewed this patient's social history and updated it with pertinent information if needed. [place]  reports that he has quit smoking. His smoking use included cigarettes. He has never used smokeless tobacco. He reports that he does not currently use alcohol. He reports that he does not use drugs.    Family History   I have reviewed this patient's family history and updated it with pertinent information if needed.   Family History   Problem Relation Age of Onset     Eye Disorder Mother      Alzheimer Disease Mother      Diabetes Father      Cancer - colorectal Father      Lung Cancer Father      Cancer Maternal Grandmother         kidney     Cancer Maternal Grandfather         lung cancer-smoker and      Cancer Paternal Grandmother         leukemia     Unknown/Adopted Paternal Grandfather        Review of Systems   The 10 point Review of Systems is negative other than noted in the HPI.     Physical Exam   Temp: 97.9  F (36.6  C) Temp src: Oral BP: 96/60 Pulse: 95   Resp: 16 SpO2: 99 % O2 Device: None (Room air)    Vital Signs with Ranges  Temp:  [97.6  F (36.4  C)-98.2  F (36.8  C)] 97.9  F (36.6  C)  Pulse:  [74-95] 95  Resp:  [16] 16  BP: ()/(53-60) 96/60  SpO2:  [97 %-99 %] 99 %  116 lbs 13.5 oz    GENERAL: look his age, alert,  NAD  HEENT:  Normocephalic. No gross abnormalities.  .  CV: RRR, no murmurs, no clicks, gallops, or rubs, no edema  RESP: Clear bilaterally with good efforts  GI: Abdomen soft/nt/nd, BS normal.   MUSCULOSKELETAL: extremities nl - no gross deformities noted  SKIN: no suspicious lesions or rashes, dry to touch  PSYCH: mood good, affect appropriate  Bladder catheter present    Data   BMP  Recent Labs   Lab 02/05/23  1219 02/05/23  0930 02/05/23  0907 02/05/23  0800 02/04/23  0853 02/04/23  0739 02/03/23  2344 02/03/23  1848 02/03/23  1544 02/03/23  0917   NA  --   --  135*  --   --  125* 126*  --  123* 124*   POTASSIUM  --   --  2.8*  --   --  3.6  --   --   --  4.0   CHLORIDE  --   --  102  --   --  93*  --   --   --  90*   MARNI  --   --  8.2*  --   --  8.4*  --   --   --  8.9   CO2  --   --  19*  --   --  20*  --   --   --  25   BUN  --   --  33.0*  --   --  37.2*  --   --   --  30.6*   CR  --   --  1.70*  --   --  1.52*  --   --   --  1.14   * 113* 79 69*   < > 100*  --    < >  --  182*    < > = values in this interval not displayed.     [email or web address](phos:4)  CBC)  Recent Labs   Lab 02/05/23  0907 02/04/23  0739 02/03/23  2344 02/03/23  0917 01/30/23  0907   WBC 13.8* 13.8*  --  17.4* 14.4*   HGB 9.5* 8.4* 9.0* 10.0* 10.0*   HCT 28.8* 25.4*  --  30.4* 32.9*   MCV 86 84  --  85 90   * 505*  --  556* 473*     Recent Labs   Lab 02/03/23  0917   AST 23   ALT 15   ALKPHOS 90   BILITOTAL 0.3     No lab results found in last 7 days.  No results found for: D2VIT, D3VIT, DTOT  Recent Labs   Lab 02/05/23  0907 02/03/23  2344 02/03/23  0917   HGB 9.5*   < > 10.0*   HCT 28.8*   < > 30.4*   MCV 86   < > 85   IRON  --   --  36*   IRONSAT  --   --  20   FEB  --   --  184*    < > = values in this interval not displayed.     No results for input(s): PTHI in the last 168 hours.

## 2023-02-06 LAB
ANION GAP SERPL CALCULATED.3IONS-SCNC: 11 MMOL/L (ref 7–15)
BUN SERPL-MCNC: 22 MG/DL (ref 8–23)
CALCIUM SERPL-MCNC: 7.4 MG/DL (ref 8.8–10.2)
CHLORIDE SERPL-SCNC: 104 MMOL/L (ref 98–107)
CREAT SERPL-MCNC: 1.59 MG/DL (ref 0.67–1.17)
DEPRECATED HCO3 PLAS-SCNC: 21 MMOL/L (ref 22–29)
EOSINOPHIL SPEC QL WRIGHT STN: NORMAL
ERYTHROCYTE [DISTWIDTH] IN BLOOD BY AUTOMATED COUNT: 16.3 % (ref 10–15)
GFR SERPL CREATININE-BSD FRML MDRD: 46 ML/MIN/1.73M2
GLUCOSE BLDC GLUCOMTR-MCNC: 154 MG/DL (ref 70–99)
GLUCOSE BLDC GLUCOMTR-MCNC: 214 MG/DL (ref 70–99)
GLUCOSE BLDC GLUCOMTR-MCNC: 242 MG/DL (ref 70–99)
GLUCOSE BLDC GLUCOMTR-MCNC: 88 MG/DL (ref 70–99)
GLUCOSE SERPL-MCNC: 82 MG/DL (ref 70–99)
HCT VFR BLD AUTO: 24.8 % (ref 40–53)
HGB BLD-MCNC: 8.4 G/DL (ref 13.3–17.7)
MCH RBC QN AUTO: 27.9 PG (ref 26.5–33)
MCHC RBC AUTO-ENTMCNC: 33.9 G/DL (ref 31.5–36.5)
MCV RBC AUTO: 82 FL (ref 78–100)
PLATELET # BLD AUTO: 503 10E3/UL (ref 150–450)
POTASSIUM SERPL-SCNC: 2.3 MMOL/L (ref 3.4–5.3)
POTASSIUM SERPL-SCNC: 2.9 MMOL/L (ref 3.4–5.3)
RBC # BLD AUTO: 3.01 10E6/UL (ref 4.4–5.9)
SODIUM SERPL-SCNC: 136 MMOL/L (ref 136–145)
WBC # BLD AUTO: 10.5 10E3/UL (ref 4–11)

## 2023-02-06 PROCEDURE — 84132 ASSAY OF SERUM POTASSIUM: CPT | Performed by: INTERNAL MEDICINE

## 2023-02-06 PROCEDURE — 250N000012 HC RX MED GY IP 250 OP 636 PS 637: Performed by: HOSPITALIST

## 2023-02-06 PROCEDURE — 99232 SBSQ HOSP IP/OBS MODERATE 35: CPT | Performed by: INTERNAL MEDICINE

## 2023-02-06 PROCEDURE — 250N000013 HC RX MED GY IP 250 OP 250 PS 637: Performed by: INTERNAL MEDICINE

## 2023-02-06 PROCEDURE — 250N000011 HC RX IP 250 OP 636: Performed by: HOSPITALIST

## 2023-02-06 PROCEDURE — 250N000013 HC RX MED GY IP 250 OP 250 PS 637: Performed by: HOSPITALIST

## 2023-02-06 PROCEDURE — 85027 COMPLETE CBC AUTOMATED: CPT | Performed by: INTERNAL MEDICINE

## 2023-02-06 PROCEDURE — 258N000003 HC RX IP 258 OP 636: Performed by: INTERNAL MEDICINE

## 2023-02-06 PROCEDURE — 89190 NASAL SMEAR FOR EOSINOPHILS: CPT | Performed by: INTERNAL MEDICINE

## 2023-02-06 PROCEDURE — 258N000002 HC RX IP 258 OP 250: Performed by: INTERNAL MEDICINE

## 2023-02-06 PROCEDURE — 99232 SBSQ HOSP IP/OBS MODERATE 35: CPT | Performed by: STUDENT IN AN ORGANIZED HEALTH CARE EDUCATION/TRAINING PROGRAM

## 2023-02-06 PROCEDURE — 120N000001 HC R&B MED SURG/OB

## 2023-02-06 PROCEDURE — 36415 COLL VENOUS BLD VENIPUNCTURE: CPT | Performed by: INTERNAL MEDICINE

## 2023-02-06 PROCEDURE — 80048 BASIC METABOLIC PNL TOTAL CA: CPT | Performed by: INTERNAL MEDICINE

## 2023-02-06 PROCEDURE — 250N000009 HC RX 250: Performed by: INTERNAL MEDICINE

## 2023-02-06 PROCEDURE — 250N000013 HC RX MED GY IP 250 OP 250 PS 637: Performed by: STUDENT IN AN ORGANIZED HEALTH CARE EDUCATION/TRAINING PROGRAM

## 2023-02-06 RX ORDER — PANTOPRAZOLE SODIUM 40 MG/1
40 TABLET, DELAYED RELEASE ORAL
Status: DISCONTINUED | OUTPATIENT
Start: 2023-02-06 | End: 2023-02-07

## 2023-02-06 RX ORDER — POTASSIUM CHLORIDE 1500 MG/1
20 TABLET, EXTENDED RELEASE ORAL ONCE
Status: COMPLETED | OUTPATIENT
Start: 2023-02-06 | End: 2023-02-06

## 2023-02-06 RX ORDER — POTASSIUM CHLORIDE 1500 MG/1
40 TABLET, EXTENDED RELEASE ORAL ONCE
Status: COMPLETED | OUTPATIENT
Start: 2023-02-06 | End: 2023-02-06

## 2023-02-06 RX ORDER — SODIUM CHLORIDE 9 MG/ML
INJECTION, SOLUTION INTRAVENOUS CONTINUOUS
Status: DISCONTINUED | OUTPATIENT
Start: 2023-02-06 | End: 2023-02-07

## 2023-02-06 RX ADMIN — INSULIN GLARGINE 10 UNITS: 100 INJECTION, SOLUTION SUBCUTANEOUS at 08:55

## 2023-02-06 RX ADMIN — SODIUM BICARBONATE: 84 INJECTION, SOLUTION INTRAVENOUS at 08:12

## 2023-02-06 RX ADMIN — SODIUM CHLORIDE: 9 INJECTION, SOLUTION INTRAVENOUS at 14:35

## 2023-02-06 RX ADMIN — PANTOPRAZOLE SODIUM 40 MG: 40 TABLET, DELAYED RELEASE ORAL at 16:48

## 2023-02-06 RX ADMIN — INSULIN ASPART 2 UNITS: 100 INJECTION, SOLUTION INTRAVENOUS; SUBCUTANEOUS at 16:48

## 2023-02-06 RX ADMIN — ASPIRIN 81 MG: 81 TABLET, COATED ORAL at 08:54

## 2023-02-06 RX ADMIN — PANTOPRAZOLE SODIUM 40 MG: 40 TABLET, DELAYED RELEASE ORAL at 06:50

## 2023-02-06 RX ADMIN — LEVOTHYROXINE SODIUM 50 MCG: 50 TABLET ORAL at 06:50

## 2023-02-06 RX ADMIN — HEPARIN SODIUM 5000 UNITS: 5000 INJECTION, SOLUTION INTRAVENOUS; SUBCUTANEOUS at 20:35

## 2023-02-06 RX ADMIN — POTASSIUM CHLORIDE 40 MEQ: 1500 TABLET, EXTENDED RELEASE ORAL at 18:30

## 2023-02-06 RX ADMIN — POTASSIUM CHLORIDE 20 MEQ: 1500 TABLET, EXTENDED RELEASE ORAL at 20:35

## 2023-02-06 RX ADMIN — TAMSULOSIN HYDROCHLORIDE 0.4 MG: 0.4 CAPSULE ORAL at 08:54

## 2023-02-06 RX ADMIN — POTASSIUM CHLORIDE 40 MEQ: 1500 TABLET, EXTENDED RELEASE ORAL at 09:39

## 2023-02-06 RX ADMIN — POTASSIUM CHLORIDE 20 MEQ: 1500 TABLET, EXTENDED RELEASE ORAL at 12:27

## 2023-02-06 RX ADMIN — INSULIN ASPART 1 UNITS: 100 INJECTION, SOLUTION INTRAVENOUS; SUBCUTANEOUS at 14:36

## 2023-02-06 RX ADMIN — SODIUM CHLORIDE: 9 INJECTION, SOLUTION INTRAVENOUS at 23:51

## 2023-02-06 RX ADMIN — PIPERACILLIN AND TAZOBACTAM 2.25 G: 2; .25 INJECTION, POWDER, FOR SOLUTION INTRAVENOUS at 04:52

## 2023-02-06 RX ADMIN — PIPERACILLIN AND TAZOBACTAM 2.25 G: 2; .25 INJECTION, POWDER, FOR SOLUTION INTRAVENOUS at 12:27

## 2023-02-06 RX ADMIN — HEPARIN SODIUM 5000 UNITS: 5000 INJECTION, SOLUTION INTRAVENOUS; SUBCUTANEOUS at 08:54

## 2023-02-06 ASSESSMENT — ACTIVITIES OF DAILY LIVING (ADL)
ADLS_ACUITY_SCORE: 44
ADLS_ACUITY_SCORE: 43
ADLS_ACUITY_SCORE: 44
ADLS_ACUITY_SCORE: 43
ADLS_ACUITY_SCORE: 45
ADLS_ACUITY_SCORE: 43
ADLS_ACUITY_SCORE: 47
ADLS_ACUITY_SCORE: 43
ADLS_ACUITY_SCORE: 43
ADLS_ACUITY_SCORE: 47
ADLS_ACUITY_SCORE: 43
ADLS_ACUITY_SCORE: 43

## 2023-02-06 NOTE — PROGRESS NOTES
Antimicrobial Stewardship Team Note    Antimicrobial Stewardship Program - A joint venture between [facility] and [facility] to optimize antibiotic management.     Patient: South Tovar  MRN: [id number]  Allergies: Patient has no known allergies.    Brief Summary: [place] Tovar is a 72 year old male admitted on 2/3/23 with dark-colored emesis. PMH significant for recurrent UTIs, chronic urinary retention with Henderson catheter, BPH, iron deficiency anemia, and DM2. He was recently admitted 1/11-1/18 for Klebsiella pneumoniae UTI with associated bacteremia and was treated with 7 days of ceftriaxone then transitioned to 7 days of oral cefuroxime.    GI is consulted and plan for EGD tomorrow, hemoglobin remains stable so low concern for active bleed. Patient reportedly had emesis on the day of admission, CT chest with patchy predominantly groundglass opacities in both mid and lower lungs, infectious or inflammatory in etiology, trace bilateral pleural effusions so he was started on Zosyn with concern for possible aspiration pneumonia. CXR on admission with no focal infiltrate, pleural effusion, or pneumothorax. Urine and blood cultures negative. WBC count 17.4 on admission, normalized to 10.5 today.         Active Anti-infective Medications   (From admission, onward)                 Start     Stop    02/05/23 2330  piperacillin-tazobactam  2.25 g,   Intravenous,   EVERY 6 HOURS        Aspiration Pneumonia, Urinary Tract Infection        --                  Assessment: Possible aspiration pneumonia  Patient presented with dark-colored emesis and anemia, concern for GI bleed. He was started on Zosyn given emesis with concern for aspiration pneumonia. He is stable on room air and afebrile indicating lower likelihood for bacterial infection. Leukocytosis on admission, now normal. Patient completed adequate antibiotic course for Klebsiella pneumoniae bacteremia/UTI and urine and blood  cultures negative this hospitalization. Recommend stopping Zosyn at this time as patient has completed adequate empiric course without signs/symptoms of ongoing infection.    Recommendations:  Stop Zosyn.    Discussed with ID Staff MD Heidi Glynn, PharmD, [id number]    Vital Signs/Clinical Features:  Vitals         02/04 0700  02/05 0659 02/05 0700  02/06 0659 02/06 0700  02/06 1337   Most Recent      Temp ( F) 97.5 -  98.2    97.9 -  98.2    97.9 -  98     98 (36.7) 02/06 1249    Pulse 74 -  91    81 -  95    80 -  84     84 02/06 1249    Resp   16    15 -  16      18     18 02/06 1249    /54 -  108/53    96/60 -  116/59    118/54 -  123/57     123/57 02/06 1249    SpO2 (%) 97 -  98    98 -  99    98 -  100     100 02/06 1249            Labs  Estimated Creatinine Clearance: 31 mL/min (A) (based on SCr of 1.59 mg/dL (H)).  Recent Labs   Lab Test 01/17/23  0554 01/24/23  0613 02/03/23  0917 02/04/23  0739 02/05/23  0907 02/06/23  0655   CR 1.19 1.20* 1.14 1.52* 1.70* 1.59*       Recent Labs   Lab Test 01/24/23  0613 01/30/23  0907 02/03/23  0917 02/03/23  2344 02/04/23  0739 02/05/23  0907 02/06/23  0655   WBC 12.8* 14.4* 17.4*  --  13.8* 13.8* 10.5   HGB 8.9* 10.0* 10.0* 9.0* 8.4* 9.5* 8.4*   HCT 28.7* 32.9* 30.4*  --  25.4* 28.8* 24.8*   MCV 87 90 85  --  84 86 82    473* 556*  --  505* 540* 503*       Recent Labs   Lab Test 09/30/22  1029 01/11/23  2100 02/03/23  0917   BILITOTAL 0.7 1.1 0.3   ALKPHOS 165* 187* 90   ALBUMIN 2.1* 2.3* 3.0*   AST 38 61* 23   ALT 37 74* 15       Recent Labs   Lab Test 01/11/23  2100 01/12/23  1618 02/03/23  0917 02/03/23  1544 02/04/23  1801 02/04/23  2110 02/05/23  2319   PCAL  --   --  0.16*  --   --   --   --    LACT 1.5 0.9  --  1.6 2.1* 0.8 0.9             Culture Results:  7-Day Micro Results       Procedure Component Value Units Date/Time    Blood Culture Peripheral Blood [[id number]]  (Normal) Collected: 02/03/23 0484    Order Status: Completed  Lab Status: Preliminary result Updated: 23    Specimen: Peripheral Blood      Culture No growth after 2 days    Blood Culture Peripheral Blood [[id number]]  (Normal) Collected: 23 1544    Order Status: Completed Lab Status: Preliminary result Updated: 23    Specimen: Peripheral Blood      Culture No growth after 2 days    Urine Culture [[id number]] Collected: 23 1311    Order Status: Completed Lab Status: Final result Updated: 23    Specimen: Urine, Henderson Catheter      Culture No Growth            Recent Labs   Lab Test 22  1343 10/22/22  1244 23  2150 23  131   URINEPH 6.0 6.0 6.5 5.5   NITRITE Negative Negative Negative Negative   LEUKEST Large* Large* Large* Large*   WBCU >182* >182* >182* >182*                         Imaging: Chest XR,  PA & LAT    Result Date: 2/3/2023  XR CHEST 2 VIEWS 2/3/2023 10:51 AM HISTORY: new hypoxia COMPARISON: 2022     IMPRESSION: No focal infiltrate, pleural effusion or pneumothorax. Normal heart size. ATIF JUÁREZ MD   SYSTEM ID:  [id number]    Echocardiogram Complete    Result Date: 2023  [id number]^SEAN^BRIELLE  [facility]ocardi[facility] [address] [place] [address]  Name: RONALD JONES MRN: [id number] : 1950 Study Date: 2023 11:24 AM Age: 72 yrs Gender: Male Patient Location: [facility] Reason For Study: Elevated troponin with elevated D-dimer. Ordering Physician: BRIELLE ESTRADA Referring Physician: BRIELLE ESTRADA Performed By: RADHA Vargas  BSA: 1.6 m2 Height: 67 in Weight: 116 lb HR: 89 BP: 105/58 mmHg ______________________________________________________________________________ Procedure (Suboptimal images, abbreviated study performed). Optison (NDC #[id number]) given intravenously. ______________________________________________________________________________ Interpretation Summary  Technically very difficultr  imaging, even with use of contrast. No regional wall motion abnormalities noted. Left ventricular systolic function is normal. The visual ejection fraction is 55-60%. The left ventricle is normal in size. The right ventricle is normal in structure, function and size. There is no pericardial effusion Grossly limited Doppler study did not demonstrate significant stenosis or insufficiency involving cardiac valves  No oold studies availble for comparison ______________________________________________________________________________ Left Ventricle The left ventricle is normal in size. There is normal left ventricular wall thickness. Left ventricular systolic function is normal. The visual ejection fraction is 55-60%. Grade I or early diastolic dysfunction. No regional wall motion abnormalities noted.  Right Ventricle The right ventricle is normal in structure, function and size. There is no mass or thrombus in the right ventricle.  Atria Normal left atrial size. Right atrium not well visualized. The left atrial appendage is not well visualized.  Mitral Valve There is no mitral annular calcification. There is no mitral regurgitation noted. There is no mitral valve stenosis.  Tricuspid Valve The tricuspid valve is not well visualized.  Aortic Valve The aortic valve is not well visualized.  Pulmonic Valve The pulmonic valve is not well visualized.  Vessels The aortic root is normal size. Inferior vena cava not well visualized for estimation of right atrial pressure.  Pericardium There is no pericardial effusion.  Rhythm Sinus rhythm was noted. ______________________________________________________________________________ MMode/2D Measurements & Calculations Ao root diam: 3.5 cm  LVOT diam: 2.5 cm LVOT area: 4.8 cm2  Doppler Measurements & Calculations MV E max tien: 42.1 cm/sec MV A max tien: 56.6 cm/sec MV E/A: 0.74 MV max P.57 mmHg MV mean P.26 mmHg MV V2 VTI: 11.4 cm MV dec time: 0.26 sec Pulm Sys Tien: 38.4 cm/sec  Pulm Squires Tien: 33.5 cm/sec Pulm A Revs Tien: 30.6 cm/sec Pulm S/D: 1.1 E/E' av.9 Lateral E/e': 4.5 Medial E/e': 7.2  ______________________________________________________________________________ Report approved by: Dr. Bucky Ordonez 2023 12:18 PM       US Lower Extremity Venous Duplex Bilateral    Result Date: 2/3/2023  EXAM: US LOWER EXTREMITY VENOUS DUPLEX BILATERAL LOCATION: [facility] DATE/TIME: 2/3/2023 5:01 PM INDICATION: elevated d dimer   rule out DVT COMPARISON: None. TECHNIQUE: Venous Duplex ultrasound of bilateral lower extremities with and without compression, augmentation and duplex. Color flow and spectral Doppler with waveform analysis performed. FINDINGS: Exam includes the common femoral, femoral, popliteal veins as well as segmentally visualized deep calf veins and greater saphenous vein. RIGHT: No deep vein thrombosis. No superficial thrombophlebitis. No popliteal cyst. LEFT: No deep vein thrombosis. No superficial thrombophlebitis. No popliteal cyst.     IMPRESSION: 1.  No deep venous thrombosis in the bilateral lower extremities.    CT Chest Pulmonary Embolism w Contrast    Result Date: 2/3/2023  CT CHEST PULMONARY EMBOLISM With CONTRAST 2/3/2023 1:45 PM CLINICAL HISTORY: Hypoxia. Tachycardia. TECHNIQUE: CT angiogram chest during arterial phase injection IV contrast. 2D and 3D MIP reconstructions were performed by the CT technologist. Dose reduction techniques were used. CONTRAST: 55mL [id number] COMPARISON: CT of the chest, abdomen, and pelvis performed 2022. FINDINGS: ANGIOGRAM CHEST: Pulmonary arteries are normal caliber and negative for pulmonary emboli. Thoracic aorta is negative for dissection. No CT evidence of right heart strain. LUNGS AND PLEURA: Trace amount of pleural fluid bilaterally. A few small calcified granulomas bilaterally. There is patchy predominantly groundglass opacities in both mid and lower lungs, new since the previous exam,  and most likely infectious or inflammatory. Mild atelectasis at both lung bases posteriorly. Mild bronchial wall thickening in both lower lobes of the lung. MEDIASTINUM/AXILLAE: No enlarged lymph nodes in the chest. No pericardial effusion. CORONARY ARTERY CALCIFICATION: Moderate. UPPER ABDOMEN: Small hiatal hernia. Moderate to large amount of gas and stool in the visualized portion of the colon. Limited views of the upper abdomen are otherwise unremarkable. MUSCULOSKELETAL: Unremarkable.     IMPRESSION: 1.  No evidence for pulmonary embolism. 2.  Patchy predominantly groundglass opacities in both mid and lower lungs are most likely infectious or inflammatory in etiology. 3.  Trace bilateral pleural effusions. 4.  Moderate to large amount of gas and stool in the visualized portion of the colon suggests constipation. BYRON FRENCH MD   SYSTEM ID:  [id number]

## 2023-02-06 NOTE — PROGRESS NOTES
[facility]    Hospitalist Progress Note    Date of Service: 02/06/2023    Assessment & Plan     South Tovar is a 72 year old male with PMHx of chronic urinary retention with chronic suh, recurrent UTIs, BPH, chronic iron deficiency anemia and DM II who was admitted on 2/3/2023 for evaluation of dark-colored emesis concerning for GI bleed.     He was previously admitted at [facility] from 10/22/22-10/24/22 with complicated UTI (CAUTI) dt candida in setting of uncontrolled DM. ID recommended 2 weeks of fluconazole which he completed.     More recently, he was admitted at [facility] from 1/1123 - 1/18/23 for Klebsiella pneumonia UTI with associated bacteremia. Seen by ID that stay, was treated with 7 days of IV ceftriaxone and discharge on an additional 7 days of oral Ceftin. That hospital stay was also complicated by STEVE dt urinary retention that improved with Suh catheter placement and treatment of sepsis.  Was noted to have urinary retention with hydronephrosis, followed by [facility]. Ultimately discharged to TCU for ongoing therapy. Staff at his TCU noted dark colored emesis and patient was brought to ED for evaluation.      Dark-colored emesis, concern for GI bleed: Resolved  Chronic iron deficiency anemia  Cecal polyp  * On iron supplement for hx of iron deficiency anemia. Baseline hgb is 8-9, had been 10.4 per labs on 1/30/23.   * Last OP EGD/colonoscopy in 11/2022 per [facility] were notable for polyps in the colon and also in the small intestine. Had been referred to a surgeon with recommendation for possible resection of part of his small intestine. No further details of the endoscopy results or the recommended care plan at present.   * On the morning of admission, patient had received multiple medications on an empty stomach. Reported he did not like the way the iron tablet felt making him burp with stomach upset and dark-colored emesis.  * In ED, was afebrile, mildly tachycardic but BPs  stable. Hgb 10.0, WBC 17.4. Na 122. Protonix 40mg IV daily started on admission.  Plan:  -- [facility] consulted and following this stay -> Plan for EGD tomorrow, 2/7/23  -- NPO after midnight  -- no recurrence of emesis this stay and serial hgbs stable at 8-9 which is his baseline  -- cont Protonix 40mg BID daily  -- holding oral iron for now, consider IV iron this stay -- will transition to ferrous gluconate at discharge (fewer GI side effects)  -- was due to follow up with [facility] colorectal surgery on 2/6/23 (Dr. Micheal Pink), will need to reschedule if patient still hospitalized    Suspected aspiration pneumonitis vs aspiration pneumonia  * Had episode of emesis on 2/3 AM after taking meds. No reported cp/sob/cough, fevers/chills.  * In ED, O2 sats briefly 88% on RA but quickly improved to 90s on RA. Briefly tachycardic. Viral swabs neg. CXR neg for acute infiltrate. CTA chest was neg for PE, did mention patchy predominantly groundglass opacities in both mid and lower lungs, ddx includes infectious vs inflammatory etiology. Also noted to have trace bilateral pleural effusions. ProBNP neg. WBC 17. Procal 0.16 (low risk). Given ceftriaxone and azithromycin in the ED. Changed to Zosyn on admission.   -- cont Zosyn for now given suspected concurrent UTI  -- O2 sats stable on RA -> cleared for sedation if needed for EGD  -- pulmonary toilet with IS/OOB as able     Recurrent, complicated, catheter associated urinary tract infection  Hx of UTIs, most recently dt klebsiella pneumoniae with associated klebsiella bacteremia in 1/2023  * As above, recently completed course of abx. No reported urinary complaints on presentation. Has chronic suh. Due to follow up with urology on 2/7/23.   * In ED, was afebrile. Mildly tachycardic. WBC 17 with mildly elevated procal UA abnl with large leukocyte esterase, greater than 182 WBCs. Lactate nl. Started on abx as above.   * [facility] consulted on admission dt concern for  recurrent UTIs -- no specific concerns, recommended follow up in clinic  -- urine and blood cultures drawn on admission remain neg; WBC improving  -- Stop Zosyn for now but as cultures remain negative    STEVE, with developing NAGMA  Chronic urinary retention with Suh catheter   BPH  Hx of STEVE dt urinary retention, improved with suh catheter placement, no baseline CKD  * Baseline Cr is around 1. Cr had been as high as 3 during recent hospital stay, normalized after placement of suh. Discharged with suh in place.   * Suh catheter exchanged in ED. Cr stable on admission.  * [facility] consulted this stay, no new recs per urology's phone dicsussion with RN on 2/4. Advised to follow up with OP urology after discharge.   -- suh draining without concerns this stay  -- Cr trending up this stay despite IVFs: 1.1 -- 1.5 -- 1.7 on 2/5, did receive contrast with CT PE study on admission  -- Nephrology following -> NS with 75meq sodium bicarbonate and decreasing rate to 75ml/hr  -- cont Flomax     Troponin elevated, likely in the setting of demand  Elevated D-dimer, no DVT/PE  * As above, was tachycardic on admission with HRs 100s. No cp/palpitations or shortness of breath.   * Incidentally found to have elevated trops this stay (HS trop 84 -- 88 on recheck). EKG showed NSR w/o acute ischemic changes. Noted to have elevated d-dimer. CT chest neg for PE.  but bilateral LE US was neg for DVT. ASA 81mg daily started on admission.   * Serial trops trended up (now 106 -- 125) but in absence of chest pain and worsening renal function make situation difficult to interpret.   * Echo obtained (suboptimal images dt technically difficult study) and showed EF 55-60% with no WMAs, grade I diastolic dysfunction; no over valve issues .  -- cont ASA 81mg daily for now  -- cont telemetry for now  -- mgmt of above medical issues     Hypochloremic hyponatremia likely from vomiting, poor intake: Resolved  * Na 124 and Cl 90 on  presentation with. Started on IVFs this stay.  * Na subsequently improved  -- encourage po intake    Hypokalemia  * K 2.8 on 2/5.   -- defer replacement protocol for now given worsening renal function  -- have ordered KCl 40mg po x2 doses today    Constipation, dt oral iron  * CT on admission was notable for a moderate to large amount of gas and stool in the visualized portion of the colon suggestive of constipation.   * Oral iron held this stay and was placed on bowel regimen  * Had good BM on 2/5.   -- cont bowel regimen     DM II, on insulin  * A1c 6.6 in 10/2022 --> 6.6 on 2/3/23.  * PTA meds: Lantus 25U daily (had only been taking 10U daily), metformin and linagliptin   -- conts on Lantus 10U daily plus sliding scale insulin  -- had isolated episode of hypoglycemia on 2/5 AM with BG 69 which resolved -- monitor for recurrence, if low BG persists will need to scale back on insulin dosing  -- holding other meds    Recent Labs   Lab 02/06/23  0804 02/06/23  0655 02/05/23  2126 02/05/23  1707 02/05/23  1219 02/05/23  0930   GLC 88 82 131* 106* 109* 113*       Hypothyroidism  * Recent labs showed TSH 14.8, FT4 nl but in setting of recent acute illness  * Chronic and stable on levothyroxine.   -- repeat thyroid studies in 10 wks     Physical deconditioning dt medical illness, sternal fragility  Recent fall with left temporal laceration, sutures removed on 1/16/23  * Had been at TCU following recent hospitalization. Noted concerns for cognitive impairment/encephalopathy.    * Oriented on admission this stay  -- anticipate return to TCU at discharge    FEN: cont IVFs today while awaiting input from nephrolgoy, lytes stable, cardiac diet  DVT Prophylaxis: subQ heparin, PCDs  Code Status: Full Code    Disposition: Pending stable labs, culture data -- suspect 2d still. Anticipate discharge back to TCU. SW following.    Parker Tesfaye MD    Medical Decision Making       -------------------------- BILLING ON TIME  ------------------------------------------------------------------------------------------------------------  40 MINUTES SPENT BY ME on the date of service doing chart review, history, exam, documentation & further activities per the note.         Interval History      Chart reviewed, overnight events noted.  No recurrent episodes of nausea/vomiting this stay. No cp/sob/cough.   EGD planned for tomorrow    -Data reviewed today: I reviewed all new labs and imaging results over the last 24 hours. I personally reviewed no images or EKG's today.    Physical Exam   Temp: 97.9  F (36.6  C) Temp src: Oral BP: 118/54 Pulse: 80   Resp: 18 SpO2: 98 % O2 Device: None (Room air)    Vitals:    02/03/23 0910 02/04/23 0617   Weight: 52 kg (114 lb 10.2 oz) 53 kg (116 lb 13.5 oz)     Vital Signs with Ranges  Temp:  [97.9  F (36.6  C)-98.2  F (36.8  C)] 97.9  F (36.6  C)  Pulse:  [80-95] 80  Resp:  [15-18] 18  BP: (103-118)/(51-58) 118/54  SpO2:  [98 %-99 %] 98 %  I/O last 3 completed shifts:  In: 360 [P.O.:360]  Out: 750 [Urine:750]    Constitutional: Frail appearing male, resting comfortably, alert and answering basic questions appropriately, NAD  Respiratory: CTAB, no wheeze/rales/rhonchi, no increased work of breathing  Cardiovascular: HRRR, no MGR, no LE edema  GI: S, NT, ND, +BS  Skin/Integumen: warm/dry, previously noted forehead laceration is healing well  Other: Henderson draining clear yellow urine    Medications     sodium bicabonate for infusion 75 mL/hr at 02/06/23 0812       aspirin  81 mg Oral Daily     heparin ANTICOAGULANT  5,000 Units Subcutaneous BID     insulin aspart  1-7 Units Subcutaneous TID AC     insulin aspart  1-5 Units Subcutaneous At Bedtime     insulin glargine  10 Units Subcutaneous QAM     levothyroxine  50 mcg Oral QAM AC     pantoprazole  40 mg Oral BID AC     piperacillin-tazobactam  2.25 g Intravenous Q6H     polyethylene glycol  17 g Oral Daily     potassium chloride  20 mEq Oral Once      senna-docusate  2 tablet Oral BID     sodium chloride (PF)  3 mL Intracatheter Q8H     sodium chloride (PF)  3 mL Intracatheter Q8H     tamsulosin  0.4 mg Oral Daily       Data   Recent Labs   Lab 02/06/23  0804 02/06/23  0655 02/05/23  2126 02/05/23  0930 02/05/23  0907 02/04/23  0853 02/04/23  0739 02/03/23  1544 02/03/23  0917   WBC  --  10.5  --   --  13.8*  --  13.8*  --  17.4*   HGB  --  8.4*  --   --  9.5*  --  8.4*   < > 10.0*   MCV  --  82  --   --  86  --  84  --  85   PLT  --  503*  --   --  540*  --  505*  --  556*   NA  --  136  --   --  135*  --  125*   < > 124*   POTASSIUM  --  2.3*  --   --  2.8*  --  3.6  --  4.0   CHLORIDE  --  104  --   --  102  --  93*  --  90*   CO2  --  21*  --   --  19*  --  20*  --  25   BUN  --  22.0  --   --  33.0*  --  37.2*  --  30.6*   CR  --  1.59*  --   --  1.70*  --  1.52*  --  1.14   ANIONGAP  --  11  --   --  14  --  12  --  9   MARNI  --  7.4*  --   --  8.2*  --  8.4*  --  8.9   GLC 88 82 131*   < > 79   < > 100*   < > 182*   ALBUMIN  --   --   --   --   --   --   --   --  3.0*   PROTTOTAL  --   --   --   --   --   --   --   --  7.0   BILITOTAL  --   --   --   --   --   --   --   --  0.3   ALKPHOS  --   --   --   --   --   --   --   --  90   ALT  --   --   --   --   --   --   --   --  15   AST  --   --   --   --   --   --   --   --  23   LIPASE  --   --   --   --   --   --   --   --  13    < > = values in this interval not displayed.       No results found for this or any previous visit (from the past 24 hour(s)).

## 2023-02-06 NOTE — PROGRESS NOTES
Pt A/O x4. VSS on RA. Reg diet.  On Tele-sinus dysrhythmia. A/1-2 GB/W. Denies pain. Henderson patent and in place. Held senna this evening for loose stools, x2 this shift. Incontinent bowel. IV infusing Sodium Bicarbonate 75 mEq in NaCL 0.45%, also on IV abx. Sepsis alert fired, lactic acid and VS WNL.

## 2023-02-06 NOTE — PROGRESS NOTES
"[facility] Progress Note:    Date: 2/6/2023    Patient Name: South Tovar    SUBJECTIVE: 72 year old male, resting comfortably. Tolerating diet. Incontinent large loose brown BM today per verbal report from nurse. Pt denies abdominal pain, nausea, or hematemesis.  Hgb today 8.4       OBJECTIVE: /51 (BP Location: Right arm, Patient Position: Semi-Pino's)   Pulse 81   Temp 98.2  F (36.8  C) (Oral)   Resp 15   Ht 1.702 m (5' 7\")   Wt 53 kg (116 lb 13.5 oz)   SpO2 99%   BMI 18.30 kg/m      Body mass index is 18.3 kg/m .    Constitutional: NAD, comfortable  Cardiovascular: RRR,   Respiratory: CTA  Abdomen: soft, non-tender, nondistended, active BS    LABS    Results for orders placed or performed during the hospital encounter of 02/03/23 (from the past 24 hour(s))   Glucose by meter   Result Value Ref Range    GLUCOSE BY METER POCT 69 (L) 70 - 99 mg/dL   Basic metabolic panel   Result Value Ref Range    Sodium 135 (L) 136 - 145 mmol/L    Potassium 2.8 (L) 3.4 - 5.3 mmol/L    Chloride 102 98 - 107 mmol/L    Carbon Dioxide (CO2) 19 (L) 22 - 29 mmol/L    Anion Gap 14 7 - 15 mmol/L    Urea Nitrogen 33.0 (H) 8.0 - 23.0 mg/dL    Creatinine 1.70 (H) 0.67 - 1.17 mg/dL    Calcium 8.2 (L) 8.8 - 10.2 mg/dL    Glucose 79 70 - 99 mg/dL    GFR Estimate 42 (L) >60 mL/min/1.73m2   CBC with platelets   Result Value Ref Range    WBC Count 13.8 (H) 4.0 - 11.0 10e3/uL    RBC Count 3.34 (L) 4.40 - 5.90 10e6/uL    Hemoglobin 9.5 (L) 13.3 - 17.7 g/dL    Hematocrit 28.8 (L) 40.0 - 53.0 %    MCV 86 78 - 100 fL    MCH 28.4 26.5 - 33.0 pg    MCHC 33.0 31.5 - 36.5 g/dL    RDW 16.2 (H) 10.0 - 15.0 %    Platelet Count 540 (H) 150 - 450 10e3/uL   Glucose by meter   Result Value Ref Range    GLUCOSE BY METER POCT 113 (H) 70 - 99 mg/dL   Glucose by meter   Result Value Ref Range    GLUCOSE BY METER POCT 109 (H) 70 - 99 mg/dL   Glucose by meter   Result Value Ref Range    GLUCOSE BY METER POCT 106 (H) 70 - 99 mg/dL   Glucose by " meter   Result Value Ref Range    GLUCOSE BY METER POCT 131 (H) 70 - 99 mg/dL   Lactic Acid STAT   Result Value Ref Range    Lactic Acid 0.9 0.7 - 2.0 mmol/L   CBC with platelets   Result Value Ref Range    WBC Count 10.5 4.0 - 11.0 10e3/uL    RBC Count 3.01 (L) 4.40 - 5.90 10e6/uL    Hemoglobin 8.4 (L) 13.3 - 17.7 g/dL    Hematocrit 24.8 (L) 40.0 - 53.0 %    MCV 82 78 - 100 fL    MCH 27.9 26.5 - 33.0 pg    MCHC 33.9 31.5 - 36.5 g/dL    RDW 16.3 (H) 10.0 - 15.0 %    Platelet Count 503 (H) 150 - 450 10e3/uL       ASSESSMENT:72 year old male with melena and hematemesis admitted for suspected upper GI bleed. Iron supplementation has been stopped secondary to suspected source of black stools. Hgb 8.4 today. Pt incontinent of large brown bowel movement this morning; denies nausea or further vomiting.     1. Melena/hematemeis    PLAN:   -Continue to monitor stools and hgb  -Continue Pantoprazole 40mg BID   -Plan for EGD tomorrow, 2/7/23, if cleared for sedation  -NPO after midnight.     Discussed above plan with Dr. Erickson    35minutes of total time was spent providing patient care, including patient evaluation, reviewing documentation/test results, and .    Sophia Dixon CNP Thank you for the opportunity to participate in the care of this patient. Please call with any questions or concerns. [phone]. Ext [phone]    CC: [facility], Mallory Davila  ________________________________________________________________________

## 2023-02-06 NOTE — PROGRESS NOTES
CRS Brief note    We were alerted by [facility] of this patient being admitted. They referred him to us in 12/2022 after colonoscopy, done as part of anemia workup, showed a large cecal polyp with high-grade dysplasia. He saw Dr. Zazueta who recommended a laparoscopic right hemicolectomy. He was unfortunately lost to follow up and surgery has not yet been scheduled. Hospitalist notes [facility] that was scheduled for today.    Stopped by briefly for a social visit. Reports he is currently feeling alright, and has an upper endoscopy scheduled for tomorrow. Reviewed with him the previous recommendation for right colectomy for the cecal polyp, and offered multiple options - [facility], follow up with Dr. [facility] of [facility], or follow up with one of the [facility] surgeons. South said he would like to move forward with Dr. Zazueta. I will let Dr. Zazueta know and our office will coordinate appropriate follow up and work on scheduling a tentative surgery date (not during this admission).    Discussed with Dr. Kwan.    For questions/paging, please contact the CRS office at [phone].    Nicanor Cochran PA-C  Colorectal Physician Assistant    [facility]  [address]  [place] [address]  T: [phone]  F: [phone]

## 2023-02-06 NOTE — PROVIDER NOTIFICATION
.MD Notification    Notified Person: MD    Notified Person Name:  Dr. Tesfaye     Notification Date/Time: 2/6/23 8:15am    Notification Interaction: Vocera Message    Purpose of Notification: FYI Critical lab K 2.3 this morning, please advise.     Orders Received: Awaiting response.    Comments:  RN Managed protocol ordered.  Replaced per protocol recheck scheduled for 1630.

## 2023-02-06 NOTE — PLAN OF CARE
Goal Outcome Evaluation:    DATE & TIME: 2/6/23 3203-1385  Cognitive Concerns/ Orientation : A&Ox4.   BEHAVIOR & AGGRESSION TOOL COLOR: Green?  ABNL VS/O2: VSS RA  MOBILITY: Up to Recliner 1 gb and walker.   PAIN MANAGMENT: Denies pain  DIET: Low Fat- NPO at midnight  BOWEL/BLADDER: Incon Bowel, suh catheter in place with good UO.  ABNL LAB/BG: Critical K 2.3- protocol ordered & Replaced. Recheck at 1630.   DRAIN/DEVICES: PIV running NS at 100cc/hr  TELEMETRY RHYTHM: NSR.  SKIN: Open area noted to patients coccyx, mepilex placed and pulsate mattress ordered. MD aware. Scattered BUE bruising.   TESTS/PROCEDURES: EGD planned for tomorrow.   D/C DATE: TBD- back to TCU pending clearance.

## 2023-02-06 NOTE — DISCHARGE INSTRUCTIONS
Please call to schedule a follow up appointment with your Urologist about 10-14 days after hospital discharge.

## 2023-02-06 NOTE — PROGRESS NOTES
Assessment and Plan:   STEVE: seen by Dr. Franz 2/5/23. Considered STEVE from contrast nephropathy and/or ATN from hypotension. He had STEVE in 1/11/23 with Cr up to 3.86. Recovered to Cr 1.20.   Suh in place. UC from 2/5 neg so far.   UO yest 1600 ml. Hypokalemia: K 2.3. Na 136, HCO3 21, Cr: 1.52 > 1.70 > 1.59.   Change IVF to NS . Cont hydration. Getting oral K.               Interval History:   GI bleed: on protonix, EGD planned.     ?UTI: he is on Zosyn. Bld cx neg so far. Await urine culture results.               Review of Systems:   Chronic suh. No N or V, no SOB. Denies pain.           Medications:       aspirin  81 mg Oral Daily     heparin ANTICOAGULANT  5,000 Units Subcutaneous BID     insulin aspart  1-7 Units Subcutaneous TID AC     insulin aspart  1-5 Units Subcutaneous At Bedtime     insulin glargine  10 Units Subcutaneous QAM     levothyroxine  50 mcg Oral QAM AC     pantoprazole  40 mg Oral BID AC     piperacillin-tazobactam  2.25 g Intravenous Q6H     polyethylene glycol  17 g Oral Daily     senna-docusate  2 tablet Oral BID     sodium chloride (PF)  3 mL Intracatheter Q8H     sodium chloride (PF)  3 mL Intracatheter Q8H     tamsulosin  0.4 mg Oral Daily       sodium bicabonate for infusion 75 mL/hr at 02/06/23 0812     Current active medications and PTA medications reviewed, see medication list for details.            Physical Exam:   Vitals were reviewed  Patient Vitals for the past 24 hrs:   BP Temp Temp src Pulse Resp SpO2   02/06/23 1249 123/57 98  F (36.7  C) Oral 84 18 100 %   02/06/23 0805 118/54 97.9  F (36.6  C) Oral 80 18 98 %   02/06/23 0454 109/51 98.2  F (36.8  C) Oral 81 15 --   02/05/23 2255 107/55 98.2  F (36.8  C) Oral 88 16 99 %   02/05/23 2224 104/56 98.1  F (36.7  C) Oral 91 16 99 %   02/05/23 2142 103/58 98  F (36.7  C) Oral 95 -- 98 %   02/05/23 1645 106/51 98  F (36.7  C) Oral 94 16 98 %       Temp:  [97.9  F (36.6  C)-98.2  F (36.8  C)] 98  F (36.7  C)  Pulse:   [80-95] 84  Resp:  [15-18] 18  BP: (103-123)/(51-58) 123/57  SpO2:  [98 %-100 %] 100 %    Temperatures:  Current - Temp: 98  F (36.7  C); Max - Temp  Av.1  F (36.7  C)  Min: 97.9  F (36.6  C)  Max: 98.2  F (36.8  C)  Respiration range: Resp  Av.5  Min: 15  Max: 18  Pulse range: Pulse  Av.6  Min: 80  Max: 95  Blood pressure range: Systolic (24hrs), Av , Min:103 , Max:123   ; Diastolic (24hrs), Av, Min:51, Max:58    Pulse oximetry range: SpO2  Av.7 %  Min: 98 %  Max: 100 %    I/O last 3 completed shifts:  In: 360 [P.O.:360]  Out: 750 [Urine:750]      Intake/Output Summary (Last 24 hours) at 2023 1306  Last data filed at 2023 0856  Gross per 24 hour   Intake 360 ml   Output 750 ml   Net -390 ml     Alert, thin  Up in chair eating lunch.        Wt Readings from Last 4 Encounters:   23 53 kg (116 lb 13.5 oz)   23 52 kg (114 lb 10.2 oz)   10/23/22 56.5 kg (124 lb 9 oz)   22 63 kg (139 lb)          Data:          Lab Results   Component Value Date     2023     2023     2023     2006     2004    Lab Results   Component Value Date    CHLORIDE 104 2023    CHLORIDE 102 2023    CHLORIDE 93 2023    CHLORIDE 101 2023    CHLORIDE 102 2023    CHLORIDE 102 01/15/2023    CHLORIDE 104 2006    CHLORIDE 105 2004    Lab Results   Component Value Date    BUN 22.0 2023    BUN 33.0 2023    BUN 37.2 2023    BUN 40 2023    BUN 42 2023    BUN 52 01/15/2023    BUN 12 2006    BUN 13 2004      Lab Results   Component Value Date    POTASSIUM 2.3 2023    POTASSIUM 2.8 2023    POTASSIUM 3.6 2023    POTASSIUM 3.6 2023    POTASSIUM 3.8 2023    POTASSIUM 3.9 01/15/2023    POTASSIUM 4.6 2006    POTASSIUM 4.2 2004    Lab Results   Component Value Date    CO2 21 2023    CO2 19 2023    CO2 20 2023     CO2 28 01/17/2023    CO2 27 01/16/2023    CO2 22 01/15/2023    CO2 28 06/26/2006    CO2 23 06/01/2004    Lab Results   Component Value Date    CR 1.59 02/06/2023    CR 1.70 02/05/2023    CR 1.52 02/04/2023    CR 1.00 06/26/2006    CR 1.00 06/01/2004        Recent Labs   Lab Test 02/06/23  0655 02/05/23  0907 02/04/23  0739   WBC 10.5 13.8* 13.8*   HGB 8.4* 9.5* 8.4*   HCT 24.8* 28.8* 25.4*   MCV 82 86 84   * 540* 505*     Recent Labs   Lab Test 02/03/23  0917 01/11/23  2100 09/30/22  1029   AST 23 61* 38   ALT 15 74* 37   ALKPHOS 90 187* 165*   BILITOTAL 0.3 1.1 0.7       Recent Labs   Lab Test 02/04/23  0739 01/13/23  0713   MAG 1.8 2.4*     Recent Labs   Lab Test 01/13/23  0713   PHOS 3.3     Recent Labs   Lab Test 02/06/23  0655 02/05/23  0907 02/04/23  0739   MARNI 7.4* 8.2* 8.4*       Lab Results   Component Value Date    MARNI 7.4 (L) 02/06/2023     Lab Results   Component Value Date    WBC 10.5 02/06/2023    HGB 8.4 (L) 02/06/2023    HCT 24.8 (L) 02/06/2023    MCV 82 02/06/2023     (H) 02/06/2023     Lab Results   Component Value Date     02/06/2023    POTASSIUM 2.3 (LL) 02/06/2023    CHLORIDE 104 02/06/2023    CO2 21 (L) 02/06/2023     (H) 02/06/2023     Lab Results   Component Value Date    BUN 22.0 02/06/2023    CR 1.59 (H) 02/06/2023     Lab Results   Component Value Date    MAG 1.8 02/04/2023     Lab Results   Component Value Date    PHOS 3.3 01/13/2023       Creatinine   Date Value Ref Range Status   02/06/2023 1.59 (H) 0.67 - 1.17 mg/dL Final   02/05/2023 1.70 (H) 0.67 - 1.17 mg/dL Final   02/04/2023 1.52 (H) 0.67 - 1.17 mg/dL Final   02/03/2023 1.14 0.67 - 1.17 mg/dL Final   01/24/2023 1.20 (H) 0.67 - 1.17 mg/dL Final   01/17/2023 1.19 0.66 - 1.25 mg/dL Final   06/26/2006 1.00 0.80 - 1.50 mg/dL Final   06/01/2004 1.00 0.80 - 1.50 mg/dL Final       Attestation:  I have reviewed today's vital signs, notes, medications, labs and imaging.     Genaro Jones MD

## 2023-02-06 NOTE — PROGRESS NOTES
Care Management Follow Up    Length of Stay (days): 3    Expected Discharge Date: 02/06/2023     Concerns to be Addressed:       Patient plan of care discussed at interdisciplinary rounds: Yes    Anticipated Discharge Disposition: Transitional Care     Anticipated Discharge Services:    Anticipated Discharge DME:      Patient/family educated on Medicare website which has current facility and service quality ratings:    Education Provided on the Discharge Plan:    Patient/Family in Agreement with the Plan:      Referrals Placed by CM/SW: Post Acute Facilities  Private pay costs discussed: Not applicable    Additional Information:  SW received a call from [facility] indicating patient has a bed hold and can return whenever medically ready.      CAMILA Rodriguez    [facility] [place]

## 2023-02-07 LAB
ANION GAP SERPL CALCULATED.3IONS-SCNC: 9 MMOL/L (ref 7–15)
ATRIAL RATE - MUSE: 94 BPM
BUN SERPL-MCNC: 16.8 MG/DL (ref 8–23)
CALCIUM SERPL-MCNC: 7.6 MG/DL (ref 8.8–10.2)
CHLORIDE SERPL-SCNC: 113 MMOL/L (ref 98–107)
CREAT SERPL-MCNC: 1.45 MG/DL (ref 0.67–1.17)
DEPRECATED HCO3 PLAS-SCNC: 19 MMOL/L (ref 22–29)
DIASTOLIC BLOOD PRESSURE - MUSE: NORMAL MMHG
GFR SERPL CREATININE-BSD FRML MDRD: 51 ML/MIN/1.73M2
GLUCOSE BLDC GLUCOMTR-MCNC: 108 MG/DL (ref 70–99)
GLUCOSE BLDC GLUCOMTR-MCNC: 113 MG/DL (ref 70–99)
GLUCOSE BLDC GLUCOMTR-MCNC: 132 MG/DL (ref 70–99)
GLUCOSE BLDC GLUCOMTR-MCNC: 148 MG/DL (ref 70–99)
GLUCOSE BLDC GLUCOMTR-MCNC: 289 MG/DL (ref 70–99)
GLUCOSE BLDC GLUCOMTR-MCNC: 313 MG/DL (ref 70–99)
GLUCOSE SERPL-MCNC: 112 MG/DL (ref 70–99)
HOLD SPECIMEN: NORMAL
INTERPRETATION ECG - MUSE: NORMAL
MAGNESIUM SERPL-MCNC: 1.5 MG/DL (ref 1.7–2.3)
MAGNESIUM SERPL-MCNC: 2.6 MG/DL (ref 1.7–2.3)
P AXIS - MUSE: 52 DEGREES
PHOSPHATE SERPL-MCNC: 1.7 MG/DL (ref 2.5–4.5)
PHOSPHATE SERPL-MCNC: 1.7 MG/DL (ref 2.5–4.5)
POTASSIUM SERPL-SCNC: 2.9 MMOL/L (ref 3.4–5.3)
POTASSIUM SERPL-SCNC: 3 MMOL/L (ref 3.4–5.3)
POTASSIUM SERPL-SCNC: 3.6 MMOL/L (ref 3.4–5.3)
PR INTERVAL - MUSE: 194 MS
QRS DURATION - MUSE: 84 MS
QT - MUSE: 382 MS
QTC - MUSE: 477 MS
R AXIS - MUSE: 44 DEGREES
SODIUM SERPL-SCNC: 141 MMOL/L (ref 136–145)
SYSTOLIC BLOOD PRESSURE - MUSE: NORMAL MMHG
T AXIS - MUSE: 73 DEGREES
UPPER GI ENDOSCOPY: NORMAL
VENTRICULAR RATE- MUSE: 94 BPM

## 2023-02-07 PROCEDURE — 88305 TISSUE EXAM BY PATHOLOGIST: CPT | Mod: TC | Performed by: INTERNAL MEDICINE

## 2023-02-07 PROCEDURE — 43239 EGD BIOPSY SINGLE/MULTIPLE: CPT | Performed by: INTERNAL MEDICINE

## 2023-02-07 PROCEDURE — 36415 COLL VENOUS BLD VENIPUNCTURE: CPT | Performed by: STUDENT IN AN ORGANIZED HEALTH CARE EDUCATION/TRAINING PROGRAM

## 2023-02-07 PROCEDURE — 250N000011 HC RX IP 250 OP 636: Performed by: INTERNAL MEDICINE

## 2023-02-07 PROCEDURE — 250N000013 HC RX MED GY IP 250 OP 250 PS 637: Performed by: INTERNAL MEDICINE

## 2023-02-07 PROCEDURE — 258N000003 HC RX IP 258 OP 636: Performed by: INTERNAL MEDICINE

## 2023-02-07 PROCEDURE — 36415 COLL VENOUS BLD VENIPUNCTURE: CPT | Performed by: INTERNAL MEDICINE

## 2023-02-07 PROCEDURE — 99232 SBSQ HOSP IP/OBS MODERATE 35: CPT | Performed by: STUDENT IN AN ORGANIZED HEALTH CARE EDUCATION/TRAINING PROGRAM

## 2023-02-07 PROCEDURE — 120N000001 HC R&B MED SURG/OB

## 2023-02-07 PROCEDURE — 83735 ASSAY OF MAGNESIUM: CPT | Performed by: INTERNAL MEDICINE

## 2023-02-07 PROCEDURE — 250N000011 HC RX IP 250 OP 636: Performed by: HOSPITALIST

## 2023-02-07 PROCEDURE — 250N000013 HC RX MED GY IP 250 OP 250 PS 637: Performed by: HOSPITALIST

## 2023-02-07 PROCEDURE — 83735 ASSAY OF MAGNESIUM: CPT | Performed by: STUDENT IN AN ORGANIZED HEALTH CARE EDUCATION/TRAINING PROGRAM

## 2023-02-07 PROCEDURE — 250N000011 HC RX IP 250 OP 636: Performed by: STUDENT IN AN ORGANIZED HEALTH CARE EDUCATION/TRAINING PROGRAM

## 2023-02-07 PROCEDURE — 84100 ASSAY OF PHOSPHORUS: CPT | Performed by: INTERNAL MEDICINE

## 2023-02-07 PROCEDURE — 84132 ASSAY OF SERUM POTASSIUM: CPT | Performed by: INTERNAL MEDICINE

## 2023-02-07 PROCEDURE — 0DB38ZX EXCISION OF LOWER ESOPHAGUS, VIA NATURAL OR ARTIFICIAL OPENING ENDOSCOPIC, DIAGNOSTIC: ICD-10-PCS | Performed by: INTERNAL MEDICINE

## 2023-02-07 PROCEDURE — 88305 TISSUE EXAM BY PATHOLOGIST: CPT | Mod: 26 | Performed by: PATHOLOGY

## 2023-02-07 PROCEDURE — 250N000013 HC RX MED GY IP 250 OP 250 PS 637: Performed by: STUDENT IN AN ORGANIZED HEALTH CARE EDUCATION/TRAINING PROGRAM

## 2023-02-07 PROCEDURE — 250N000009 HC RX 250: Performed by: INTERNAL MEDICINE

## 2023-02-07 PROCEDURE — 84132 ASSAY OF SERUM POTASSIUM: CPT | Performed by: STUDENT IN AN ORGANIZED HEALTH CARE EDUCATION/TRAINING PROGRAM

## 2023-02-07 PROCEDURE — 84100 ASSAY OF PHOSPHORUS: CPT | Performed by: STUDENT IN AN ORGANIZED HEALTH CARE EDUCATION/TRAINING PROGRAM

## 2023-02-07 PROCEDURE — 99232 SBSQ HOSP IP/OBS MODERATE 35: CPT | Performed by: INTERNAL MEDICINE

## 2023-02-07 RX ORDER — POTASSIUM CHLORIDE 1500 MG/1
40 TABLET, EXTENDED RELEASE ORAL ONCE
Status: COMPLETED | OUTPATIENT
Start: 2023-02-07 | End: 2023-02-07

## 2023-02-07 RX ORDER — DEXTROSE MONOHYDRATE, SODIUM CHLORIDE, AND POTASSIUM CHLORIDE 50; 1.49; 4.5 G/1000ML; G/1000ML; G/1000ML
INJECTION, SOLUTION INTRAVENOUS CONTINUOUS
Status: DISCONTINUED | OUTPATIENT
Start: 2023-02-07 | End: 2023-02-08

## 2023-02-07 RX ORDER — POTASSIUM CHLORIDE 1500 MG/1
20 TABLET, EXTENDED RELEASE ORAL ONCE
Status: COMPLETED | OUTPATIENT
Start: 2023-02-07 | End: 2023-02-07

## 2023-02-07 RX ORDER — LIDOCAINE 40 MG/G
CREAM TOPICAL
Status: DISCONTINUED | OUTPATIENT
Start: 2023-02-07 | End: 2023-02-07 | Stop reason: HOSPADM

## 2023-02-07 RX ORDER — FLUMAZENIL 0.1 MG/ML
0.2 INJECTION, SOLUTION INTRAVENOUS
Status: ACTIVE | OUTPATIENT
Start: 2023-02-07 | End: 2023-02-07

## 2023-02-07 RX ORDER — NALOXONE HYDROCHLORIDE 0.4 MG/ML
0.2 INJECTION, SOLUTION INTRAMUSCULAR; INTRAVENOUS; SUBCUTANEOUS
Status: DISCONTINUED | OUTPATIENT
Start: 2023-02-07 | End: 2023-02-09 | Stop reason: HOSPADM

## 2023-02-07 RX ORDER — SODIUM BICARBONATE 650 MG/1
650 TABLET ORAL 2 TIMES DAILY
Status: DISCONTINUED | OUTPATIENT
Start: 2023-02-07 | End: 2023-02-08

## 2023-02-07 RX ORDER — NALOXONE HYDROCHLORIDE 0.4 MG/ML
0.4 INJECTION, SOLUTION INTRAMUSCULAR; INTRAVENOUS; SUBCUTANEOUS
Status: DISCONTINUED | OUTPATIENT
Start: 2023-02-07 | End: 2023-02-09 | Stop reason: HOSPADM

## 2023-02-07 RX ORDER — MAGNESIUM SULFATE HEPTAHYDRATE 40 MG/ML
4 INJECTION, SOLUTION INTRAVENOUS ONCE
Status: COMPLETED | OUTPATIENT
Start: 2023-02-07 | End: 2023-02-07

## 2023-02-07 RX ORDER — POTASSIUM CHLORIDE 1500 MG/1
20 TABLET, EXTENDED RELEASE ORAL ONCE
Status: COMPLETED | OUTPATIENT
Start: 2023-02-07 | End: 2023-02-08

## 2023-02-07 RX ORDER — PANTOPRAZOLE SODIUM 40 MG/1
40 TABLET, DELAYED RELEASE ORAL
Status: DISCONTINUED | OUTPATIENT
Start: 2023-02-08 | End: 2023-02-09 | Stop reason: HOSPADM

## 2023-02-07 RX ORDER — FENTANYL CITRATE 50 UG/ML
INJECTION, SOLUTION INTRAMUSCULAR; INTRAVENOUS PRN
Status: DISCONTINUED | OUTPATIENT
Start: 2023-02-07 | End: 2023-02-07 | Stop reason: HOSPADM

## 2023-02-07 RX ADMIN — POTASSIUM CHLORIDE, DEXTROSE MONOHYDRATE AND SODIUM CHLORIDE: 150; 5; 450 INJECTION, SOLUTION INTRAVENOUS at 23:42

## 2023-02-07 RX ADMIN — POTASSIUM CHLORIDE 20 MEQ: 1500 TABLET, EXTENDED RELEASE ORAL at 03:32

## 2023-02-07 RX ADMIN — POTASSIUM CHLORIDE, DEXTROSE MONOHYDRATE AND SODIUM CHLORIDE: 150; 5; 450 INJECTION, SOLUTION INTRAVENOUS at 11:45

## 2023-02-07 RX ADMIN — PANTOPRAZOLE SODIUM 40 MG: 40 TABLET, DELAYED RELEASE ORAL at 07:13

## 2023-02-07 RX ADMIN — POTASSIUM CHLORIDE 40 MEQ: 1500 TABLET, EXTENDED RELEASE ORAL at 08:49

## 2023-02-07 RX ADMIN — MAGNESIUM SULFATE HEPTAHYDRATE 4 G: 40 INJECTION, SOLUTION INTRAVENOUS at 11:30

## 2023-02-07 RX ADMIN — SODIUM CHLORIDE: 9 INJECTION, SOLUTION INTRAVENOUS at 09:30

## 2023-02-07 RX ADMIN — INSULIN GLARGINE 10 UNITS: 100 INJECTION, SOLUTION SUBCUTANEOUS at 08:54

## 2023-02-07 RX ADMIN — POTASSIUM & SODIUM PHOSPHATES POWDER PACK 280-160-250 MG 2 PACKET: 280-160-250 PACK at 17:37

## 2023-02-07 RX ADMIN — POTASSIUM CHLORIDE 20 MEQ: 1500 TABLET, EXTENDED RELEASE ORAL at 10:15

## 2023-02-07 RX ADMIN — HEPARIN SODIUM 5000 UNITS: 5000 INJECTION, SOLUTION INTRAVENOUS; SUBCUTANEOUS at 20:03

## 2023-02-07 RX ADMIN — SODIUM BICARBONATE 650 MG TABLET 650 MG: at 11:28

## 2023-02-07 RX ADMIN — POTASSIUM & SODIUM PHOSPHATES POWDER PACK 280-160-250 MG 2 PACKET: 280-160-250 PACK at 13:39

## 2023-02-07 RX ADMIN — TAMSULOSIN HYDROCHLORIDE 0.4 MG: 0.4 CAPSULE ORAL at 08:46

## 2023-02-07 RX ADMIN — LEVOTHYROXINE SODIUM 50 MCG: 50 TABLET ORAL at 07:13

## 2023-02-07 RX ADMIN — SODIUM BICARBONATE 650 MG TABLET 650 MG: at 20:03

## 2023-02-07 RX ADMIN — POTASSIUM & SODIUM PHOSPHATES POWDER PACK 280-160-250 MG 2 PACKET: 280-160-250 PACK at 11:28

## 2023-02-07 RX ADMIN — POTASSIUM CHLORIDE 40 MEQ: 1500 TABLET, EXTENDED RELEASE ORAL at 01:39

## 2023-02-07 ASSESSMENT — ACTIVITIES OF DAILY LIVING (ADL)
ADLS_ACUITY_SCORE: 47
ADLS_ACUITY_SCORE: 47
ADLS_ACUITY_SCORE: 46
ADLS_ACUITY_SCORE: 47
ADLS_ACUITY_SCORE: 47
ADLS_ACUITY_SCORE: 46
ADLS_ACUITY_SCORE: 46
ADLS_ACUITY_SCORE: 47
ADLS_ACUITY_SCORE: 46
ADLS_ACUITY_SCORE: 46

## 2023-02-07 NOTE — PROGRESS NOTES
Pt is A&O x4, VSS on room air, denies pain, Henderson catheter intact due to retention. Pt is NPO for EGD today. Recheck K+ was 3.0 potassium replaced and recheck scheduled for 08:28. Pt has BMP 0600. Incontinent of green soft stool x 2. NS infusing at 100 ml/hr. Meplex to coccyx for blanchable redness.  Pulsate mattress on bed. Needs assistance of 1-2 with use of gait belt and walker.

## 2023-02-07 NOTE — PROGRESS NOTES
Notified provider about indwelling suh catheter discussed removal or continued need.    Did provider choose to remove indwelling suh catheter? No    Provider's ush indication for keeping indwelling suh catheter: Retention.    Is there an order for indwelling suh catheter? Yes    *If there is a plan to keep suh catheter in place at discharge daily notification with provider is not necessary, but please add a notation in the treatment team sticky note that the patient will be discharging with the catheter.

## 2023-02-07 NOTE — PROGRESS NOTES
[facility]    Hospitalist Progress Note    Date of Service: 02/07/2023    Assessment & Plan     [place] is a 72 year old male with PMHx of chronic urinary retention with chronic suh, recurrent UTIs, BPH, chronic iron deficiency anemia and DM II who was admitted on 2/3/2023 for evaluation of dark-colored emesis concerning for GI bleed.     He was previously admitted at [facility] from 10/22/22-10/24/22 with complicated UTI (CAUTI) dt candida in setting of uncontrolled DM. ID recommended 2 weeks of fluconazole which he completed.     More recently, he was admitted at [facility] from 1/1123 - 1/18/23 for Klebsiella pneumonia UTI with associated bacteremia. Seen by ID that stay, was treated with 7 days of IV ceftriaxone and discharge on an additional 7 days of oral Ceftin. That hospital stay was also complicated by STEVE dt urinary retention that improved with Suh catheter placement and treatment of sepsis.  Was noted to have urinary retention with hydronephrosis, followed by [facility]. Ultimately discharged to TCU for ongoing therapy. Staff at his TCU noted dark colored emesis and patient was brought to ED for evaluation.      Dark-colored emesis, concern for GI bleed: Resolved  Chronic iron deficiency anemia  Cecal polyp  * On iron supplement for hx of iron deficiency anemia. Baseline hgb is 8-9, had been 10.4 per labs on 1/30/23.   * Last OP EGD/colonoscopy in 11/2022 per [facility] were notable for polyps in the colon and also in the small intestine. Had been referred to a surgeon with recommendation for possible resection of part of his small intestine. No further details of the endoscopy results or the recommended care plan at present.   * On the morning of admission, patient had received multiple medications on an empty stomach. Reported he did not like the way the iron tablet felt making him burp with stomach upset and dark-colored emesis.  * In ED, was afebrile, mildly tachycardic but BPs  stable. Hgb 10.0, WBC 17.4. Na 122. Protonix 40mg IV daily started on admission.  Plan:  -- [facility] consulted and following this stay -> Plan for EGD today, 2/7/23  -- NPO  -- no recurrence of emesis this stay and serial hgbs stable at 8-9 which is his baseline  -- cont Protonix 40mg BID daily  -- holding oral iron for now, consider IV iron this stay -- will transition to ferrous gluconate at discharge (fewer GI side effects)  -- was due to follow up with [facility] colorectal surgery on 2/6/23 (Dr. Micheal Pink), will need to reschedule if patient still hospitalized    Suspected aspiration pneumonitis vs aspiration pneumonia  * Had episode of emesis on 2/3 AM after taking meds. No reported cp/sob/cough, fevers/chills.  * In ED, O2 sats briefly 88% on RA but quickly improved to 90s on RA. Briefly tachycardic. Viral swabs neg. CXR neg for acute infiltrate. CTA chest was neg for PE, did mention patchy predominantly groundglass opacities in both mid and lower lungs, ddx includes infectious vs inflammatory etiology. Also noted to have trace bilateral pleural effusions. ProBNP neg. WBC 17. Procal 0.16 (low risk). Given ceftriaxone and azithromycin in the ED. Changed to Zosyn on admission.   -- cont Zosyn for now given suspected concurrent UTI  -- O2 sats stable on RA -> cleared for sedation if needed for EGD  -- pulmonary toilet with IS/OOB as able     Recurrent, complicated, catheter associated urinary tract infection  Hx of UTIs, most recently dt klebsiella pneumoniae with associated klebsiella bacteremia in 1/2023  * As above, recently completed course of abx. No reported urinary complaints on presentation. Has chronic suh. Due to follow up with urology on 2/7/23.   * In ED, was afebrile. Mildly tachycardic. WBC 17 with mildly elevated procal UA abnl with large leukocyte esterase, greater than 182 WBCs. Lactate nl. Started on abx as above.   * [facility] consulted on admission dt concern for recurrent UTIs -- no  specific concerns, recommended follow up in clinic  -- urine and blood cultures drawn on admission remain neg; WBC improving  -- Stop Zosyn for now but as cultures remain negative    STEVE, with developing NAGMA  Chronic urinary retention with Suh catheter   BPH  Hx of STEVE dt urinary retention, improved with suh catheter placement, no baseline CKD  * Baseline Cr is around 1. Cr had been as high as 3 during recent hospital stay, normalized after placement of suh. Discharged with suh in place.   * Suh catheter exchanged in ED. Cr stable on admission.  * [facility] consulted this stay, no new recs per urology's phone dicsussion with RN on 2/4. Advised to follow up with OP urology after discharge.   -- suh draining without concerns this stay  -- Cr trending up this stay despite IVFs: 1.1 -- 1.5 -- 1.7 on 2/5, did receive contrast with CT PE study on admission  -- Nephrology following -> NS with 75meq sodium bicarbonate and decreasing rate to 75ml/hr  -- cont Flomax     Troponin elevated, likely in the setting of demand  Elevated D-dimer, no DVT/PE  * As above, was tachycardic on admission with HRs 100s. No cp/palpitations or shortness of breath.   * Incidentally found to have elevated trops this stay (HS trop 84 -- 88 on recheck). EKG showed NSR w/o acute ischemic changes. Noted to have elevated d-dimer. CT chest neg for PE.  but bilateral LE US was neg for DVT. ASA 81mg daily started on admission.   * Serial trops trended up (now 106 -- 125) but in absence of chest pain and worsening renal function make situation difficult to interpret.   * Echo obtained (suboptimal images dt technically difficult study) and showed EF 55-60% with no WMAs, grade I diastolic dysfunction; no over valve issues .  -- cont ASA 81mg daily for now  -- cont telemetry for now  -- mgmt of above medical issues     Hypochloremic hyponatremia likely from vomiting, poor intake: Resolved  * Na 124 and Cl 90 on presentation with. Started on  IVFs this stay.  * Na subsequently improved  -- encourage po intake    Hypokalemia  * K 2.8 on 2/5.   -- On high replacement protocol    Constipation, dt oral iron  * CT on admission was notable for a moderate to large amount of gas and stool in the visualized portion of the colon suggestive of constipation.   * Oral iron held this stay and was placed on bowel regimen  * Had good BM on 2/5.   -- cont bowel regimen     DM II, on insulin  * A1c 6.6 in 10/2022 --> 6.6 on 2/3/23.  * PTA meds: Lantus 25U daily (had only been taking 10U daily), metformin and linagliptin   -- conts on Lantus 10U daily plus sliding scale insulin  -- had isolated episode of hypoglycemia on 2/5 AM with BG 69 which resolved -- monitor for recurrence, if low BG persists will need to scale back on insulin dosing  -- holding other meds    Recent Labs   Lab 02/07/23  1211 02/07/23  0800 02/07/23  0728 02/07/23  0216 02/06/23  2049 02/06/23  1637   * 113* 112* 148* 242* 214*       Hypothyroidism  * Recent labs showed TSH 14.8, FT4 nl but in setting of recent acute illness  * Chronic and stable on levothyroxine.   -- repeat thyroid studies in 10 wks     Physical deconditioning dt medical illness, sternal fragility  Recent fall with left temporal laceration, sutures removed on 1/16/23  Cachexia  * Had been at TCU following recent hospitalization. Noted concerns for cognitive impairment/encephalopathy.    * Oriented on admission this stay  -- anticipate return to TCU at discharge    FEN: cont IVFs today while awaiting input from nephrolgoy, lytes stable, cardiac diet  DVT Prophylaxis: subQ heparin, PCDs  Code Status: Full Code    Disposition: Pending stable labs, culture data -- suspect 2d still. Anticipate discharge back to TCU. SW following.    Parker Tesfaye MD    Medical Decision Making       -------------------------- BILLING ON TIME  ------------------------------------------------------------------------------------------------------------  40 MINUTES SPENT BY ME on the date of service doing chart review, history, exam, documentation & further activities per the note.         Interval History      Chart reviewed, overnight events noted.  No recurrent episodes of nausea/vomiting today. No cp/sob/cough.   EGD planned for today if K improved this afternoon  No bloody stools  No fevers or chills    -Data reviewed today: I reviewed all new labs and imaging results over the last 24 hours. I personally reviewed no images or EKG's today.    Physical Exam   Temp: 98.2  F (36.8  C) Temp src: Oral BP: 133/63 Pulse: 77   Resp: 18 SpO2: 99 % O2 Device: None (Room air)    Vitals:    02/03/23 0910 02/04/23 0617   Weight: 52 kg (114 lb 10.2 oz) 53 kg (116 lb 13.5 oz)     Vital Signs with Ranges  Temp:  [97.9  F (36.6  C)-98.4  F (36.9  C)] 98.2  F (36.8  C)  Pulse:  [77-88] 77  Resp:  [16-20] 18  BP: (119-133)/(57-63) 133/63  SpO2:  [97 %-100 %] 99 %  I/O last 3 completed shifts:  In: 240 [P.O.:240]  Out: 1800 [Urine:1800]    Constitutional: Frail appearing male, resting comfortably, alert and answering basic questions appropriately, NAD  Respiratory: CTAB, no wheeze/rales/rhonchi, no increased work of breathing  Cardiovascular: HRRR, no MGR, no LE edema  GI: S, NT, ND, +BS  Skin/Integumen: warm/dry, previously noted forehead laceration is healing well  Other: Henderson draining clear yellow urine    Medications     dextrose 5% and 0.45% NaCl + KCl 20 mEq/L 100 mL/hr at 02/07/23 1145       aspirin  81 mg Oral Daily     heparin ANTICOAGULANT  5,000 Units Subcutaneous BID     insulin aspart  1-7 Units Subcutaneous TID AC     insulin aspart  1-5 Units Subcutaneous At Bedtime     insulin glargine  10 Units Subcutaneous QAM     levothyroxine  50 mcg Oral QAM AC     pantoprazole  40 mg Oral BID AC     polyethylene glycol  17 g Oral Daily     potassium & sodium  phosphates  2 packet Oral or Feeding Tube Q4H     senna-docusate  2 tablet Oral BID     sodium bicarbonate  650 mg Oral BID     sodium chloride (PF)  3 mL Intracatheter Q8H     sodium chloride (PF)  3 mL Intracatheter Q8H     sodium chloride (PF)  3 mL Intracatheter Q8H     tamsulosin  0.4 mg Oral Daily       Data   Recent Labs   Lab 02/07/23  1211 02/07/23  0800 02/07/23  0728 02/07/23  0216 02/07/23  0048 02/06/23  2049 02/06/23  1638 02/06/23  0804 02/06/23  0655 02/05/23  0930 02/05/23  0907 02/04/23  0853 02/04/23  0739 02/03/23  1544 02/03/23  0917   WBC  --   --   --   --   --   --   --   --  10.5  --  13.8*  --  13.8*  --  17.4*   HGB  --   --   --   --   --   --   --   --  8.4*  --  9.5*  --  8.4*   < > 10.0*   MCV  --   --   --   --   --   --   --   --  82  --  86  --  84  --  85   PLT  --   --   --   --   --   --   --   --  503*  --  540*  --  505*  --  556*   NA  --   --  141  --   --   --   --   --  136  --  135*  --  125*   < > 124*   POTASSIUM  --   --  2.9*  --  3.0*  --  2.9*  --  2.3*  --  2.8*  --  3.6  --  4.0   CHLORIDE  --   --  113*  --   --   --   --   --  104  --  102  --  93*  --  90*   CO2  --   --  19*  --   --   --   --   --  21*  --  19*  --  20*  --  25   BUN  --   --  16.8  --   --   --   --   --  22.0  --  33.0*  --  37.2*  --  30.6*   CR  --   --  1.45*  --   --   --   --   --  1.59*  --  1.70*  --  1.52*  --  1.14   ANIONGAP  --   --  9  --   --   --   --   --  11  --  14  --  12  --  9   MARNI  --   --  7.6*  --   --   --   --   --  7.4*  --  8.2*  --  8.4*  --  8.9   * 113* 112*   < >  --    < >  --    < > 82   < > 79   < > 100*   < > 182*   ALBUMIN  --   --   --   --   --   --   --   --   --   --   --   --   --   --  3.0*   PROTTOTAL  --   --   --   --   --   --   --   --   --   --   --   --   --   --  7.0   BILITOTAL  --   --   --   --   --   --   --   --   --   --   --   --   --   --  0.3   ALKPHOS  --   --   --   --   --   --   --   --   --   --   --   --   --   --  90    ALT  --   --   --   --   --   --   --   --   --   --   --   --   --   --  15   AST  --   --   --   --   --   --   --   --   --   --   --   --   --   --  23   LIPASE  --   --   --   --   --   --   --   --   --   --   --   --   --   --  13    < > = values in this interval not displayed.       No results found for this or any previous visit (from the past 24 hour(s)).

## 2023-02-07 NOTE — PROVIDER NOTIFICATION
MD paged for phosphorus 1.7, K+ 2.9. K+ will be administer per RN management protocol. Awaiting intervention for phosphorus. Orders placed  Phosphorus to be replace per RN management protocol.

## 2023-02-07 NOTE — PLAN OF CARE
Goal Outcome Evaluation:             Shift [id number]    A/Ox4. VSS on RA. K 2.9- replaced. Blood sugar (214) Denies pain. A1 GB walker. Mod carb diet- NPO midnight. Incontinent bowel. chronic suh. NS running @ 100 mL/hr. Mepilex on coccyx. EGD tomorrow.

## 2023-02-07 NOTE — PLAN OF CARE
Goal Outcome Evaluation:    Admitting Diagnosis: Hyponatremia,  Generalized muscle weakness,  Acute cystitis without hematuria,  Acute respiratory failure with hypoxia,  Community acquired pneumonia, unspecified laterality.    Patient A&Ox4. VSS. Tel NSR. CMS inatct. Denied pain, N/V. On Mg 1.5, K+ 2.9, Phos1.7 replacement given per protocol, look for recheck lab draw. Aspirin, heparin med held for ENDO procedure today if K+ increase to 3.0 per anesthesia request MD aware, awaiting K+ lab results them paient will possibly have ENDO procedure done today.  Dextrose 5% and 0.45% NaCl + KCl 20 mEq/L infusing at 100 mL/hr. Incontinent of BM with x2 loose soft stool. Henderson in place and patent. Will continue to monitor.

## 2023-02-07 NOTE — PROGRESS NOTES
GI: EGD showed ? Coon's, bx taken. No reason for bleeding found. Pt reports stools are now green. No sign of ongoing UGI bleed    Recs  -[facility] will f/u path  -Resume usual diet  -Continue PPI    Will sign off. Call with questions.    Gayle Erickson MD  [facility]  Phone [phone] until 5 pm  Office [phone] for after hours on call provider

## 2023-02-07 NOTE — OR NURSING
Procedures - Endoscopy   Procedure: Upper Endoscopy  Indications: Anemia  Therapies/Interventions biopsies of distal esophagus  Specimens: Collected yes  Sedation: Fentanyl 100 mcg; Midazolam 2 mg  Sedation Time: 9 minutes  Airway management: nasal cannula   Response to procedure: toerated well  SBAR Post procedure to:    Pascale Wheeler, RN     Registered Nurse     Since 2/7/2023     [phone]     Patient transported via litter to and from Patient care Unit Ortho unit by Patient Transport Services.    Adore Milian RN, BSN, CGRN  Endoscopy staff

## 2023-02-07 NOTE — PROVIDER NOTIFICATION
MD was paged: ENDO tech called this morning with regard to the procedure that is supposed to be done today, but Anesthesia will not do the procedure due to low K+ unless it is 3.0. An they are asking if Pt can be given aggressive replacement than possible endo can be done. But for now it won't be possible.       Awaiting for MD intervention.    20 mEq K+ order, and given

## 2023-02-07 NOTE — PROGRESS NOTES
Assessment and Plan:   STEVE: Getting IV Mg, oral neutraphos, oral K . Suh in place. On NS at 100 ml/h.   UO 1800 ml yest.   Cr improving, now 1.45. K 2.9. HCO3 19. Mg 1.5, phos 1.7.     Likely refeeding syndrome. Monitor electrolytes.     Cont IVF, adjust. . Add oral bicarb.             Interval History:   GI bleed: EGD on hold due to electrolyte problems.   UTI: suh, no antibiotics now.   DM                   Review of Systems:   He is NPO. Ate well yest. He lives in a house in [place] with his wife.           Medications:       aspirin  81 mg Oral Daily     heparin ANTICOAGULANT  5,000 Units Subcutaneous BID     insulin aspart  1-7 Units Subcutaneous TID AC     insulin aspart  1-5 Units Subcutaneous At Bedtime     insulin glargine  10 Units Subcutaneous QAM     levothyroxine  50 mcg Oral QAM AC     pantoprazole  40 mg Oral BID AC     polyethylene glycol  17 g Oral Daily     potassium & sodium phosphates  2 packet Oral or Feeding Tube Q4H     senna-docusate  2 tablet Oral BID     sodium chloride (PF)  3 mL Intracatheter Q8H     sodium chloride (PF)  3 mL Intracatheter Q8H     sodium chloride (PF)  3 mL Intracatheter Q8H     tamsulosin  0.4 mg Oral Daily       sodium chloride 100 mL/hr at 02/07/23 0930     Current active medications and PTA medications reviewed, see medication list for details.            Physical Exam:   Vitals were reviewed  Patient Vitals for the past 24 hrs:   BP Temp Temp src Pulse Resp SpO2   02/07/23 0757 133/63 98.2  F (36.8  C) Oral 77 18 99 %   02/07/23 0334 119/63 97.9  F (36.6  C) Oral 85 16 97 %   02/06/23 2357 123/58 98.1  F (36.7  C) Oral 80 16 98 %   02/06/23 2021 119/57 98.4  F (36.9  C) Oral 88 16 98 %   02/06/23 1638 122/59 97.9  F (36.6  C) Oral 85 20 100 %   02/06/23 1249 123/57 98  F (36.7  C) Oral 84 18 100 %       Temp:  [97.9  F (36.6  C)-98.4  F (36.9  C)] 98.2  F (36.8  C)  Pulse:  [77-88] 77  Resp:  [16-20] 18  BP: (119-133)/(57-63) 133/63  SpO2:  [97 %-100 %]  99 %    Temperatures:  Current - Temp: 98.2  F (36.8  C); Max - Temp  Av.1  F (36.7  C)  Min: 97.9  F (36.6  C)  Max: 98.4  F (36.9  C)  Respiration range: Resp  Av.3  Min: 16  Max: 20  Pulse range: Pulse  Av.2  Min: 77  Max: 88  Blood pressure range: Systolic (24hrs), Av , Min:119 , Max:133   ; Diastolic (24hrs), Av, Min:57, Max:63    Pulse oximetry range: SpO2  Av.7 %  Min: 97 %  Max: 100 %    I/O last 3 completed shifts:  In: 240 [P.O.:240]  Out: 1800 [Urine:1800]      Intake/Output Summary (Last 24 hours) at 2023 1026  Last data filed at 2023 0712  Gross per 24 hour   Intake --   Output 2500 ml   Net -2500 ml       Cachectic, alert and responsive  Lungs with clear BS  Cor RRR nl S1 S2 no M  LE no edema  Henderson         Wt Readings from Last 4 Encounters:   23 53 kg (116 lb 13.5 oz)   23 52 kg (114 lb 10.2 oz)   10/23/22 56.5 kg (124 lb 9 oz)   22 63 kg (139 lb)          Data:          Lab Results   Component Value Date     2023     2023     2023     2006     2004    Lab Results   Component Value Date    CHLORIDE 113 2023    CHLORIDE 104 2023    CHLORIDE 102 2023    CHLORIDE 101 2023    CHLORIDE 102 2023    CHLORIDE 102 01/15/2023    CHLORIDE 104 2006    CHLORIDE 105 2004    Lab Results   Component Value Date    BUN 16.8 2023    BUN 22.0 2023    BUN 33.0 2023    BUN 40 2023    BUN 42 2023    BUN 52 01/15/2023    BUN 12 2006    BUN 13 2004      Lab Results   Component Value Date    POTASSIUM 2.9 2023    POTASSIUM 3.0 2023    POTASSIUM 2.9 2023    POTASSIUM 3.6 2023    POTASSIUM 3.8 2023    POTASSIUM 3.9 01/15/2023    POTASSIUM 4.6 2006    POTASSIUM 4.2 2004    Lab Results   Component Value Date    CO2 19 2023    CO2 21 2023    CO2 19 2023    CO2 28 2023     CO2 27 01/16/2023    CO2 22 01/15/2023    CO2 28 06/26/2006    CO2 23 06/01/2004    Lab Results   Component Value Date    CR 1.45 02/07/2023    CR 1.59 02/06/2023    CR 1.70 02/05/2023    CR 1.00 06/26/2006    CR 1.00 06/01/2004        Recent Labs   Lab Test 02/06/23  0655 02/05/23  0907 02/04/23  0739   WBC 10.5 13.8* 13.8*   HGB 8.4* 9.5* 8.4*   HCT 24.8* 28.8* 25.4*   MCV 82 86 84   * 540* 505*     Recent Labs   Lab Test 02/03/23  0917 01/11/23  2100 09/30/22  1029   AST 23 61* 38   ALT 15 74* 37   ALKPHOS 90 187* 165*   BILITOTAL 0.3 1.1 0.7       Recent Labs   Lab Test 02/07/23  0728 02/04/23  0739 01/13/23  0713   MAG 1.5* 1.8 2.4*     Recent Labs   Lab Test 02/07/23  0728 01/13/23  0713   PHOS 1.7* 3.3     Recent Labs   Lab Test 02/07/23  0728 02/06/23  0655 02/05/23  0907   MARNI 7.6* 7.4* 8.2*       Lab Results   Component Value Date    MARNI 7.6 (L) 02/07/2023     Lab Results   Component Value Date    WBC 10.5 02/06/2023    HGB 8.4 (L) 02/06/2023    HCT 24.8 (L) 02/06/2023    MCV 82 02/06/2023     (H) 02/06/2023     Lab Results   Component Value Date     02/07/2023    POTASSIUM 2.9 (L) 02/07/2023    CHLORIDE 113 (H) 02/07/2023    CO2 19 (L) 02/07/2023     (H) 02/07/2023     Lab Results   Component Value Date    BUN 16.8 02/07/2023    CR 1.45 (H) 02/07/2023     Lab Results   Component Value Date    MAG 1.5 (L) 02/07/2023     Lab Results   Component Value Date    PHOS 1.7 (L) 02/07/2023       Creatinine   Date Value Ref Range Status   02/07/2023 1.45 (H) 0.67 - 1.17 mg/dL Final   02/06/2023 1.59 (H) 0.67 - 1.17 mg/dL Final   02/05/2023 1.70 (H) 0.67 - 1.17 mg/dL Final   02/04/2023 1.52 (H) 0.67 - 1.17 mg/dL Final   02/03/2023 1.14 0.67 - 1.17 mg/dL Final   01/24/2023 1.20 (H) 0.67 - 1.17 mg/dL Final   06/26/2006 1.00 0.80 - 1.50 mg/dL Final   06/01/2004 1.00 0.80 - 1.50 mg/dL Final       Attestation:  I have reviewed today's vital signs, notes, medications, labs and imaging.      Genaro Jones MD

## 2023-02-08 ENCOUNTER — APPOINTMENT (OUTPATIENT)
Dept: PHYSICAL THERAPY | Facility: CLINIC | Age: 73
DRG: 380 | End: 2023-02-08
Attending: STUDENT IN AN ORGANIZED HEALTH CARE EDUCATION/TRAINING PROGRAM
Payer: COMMERCIAL

## 2023-02-08 LAB
ANION GAP SERPL CALCULATED.3IONS-SCNC: 10 MMOL/L (ref 7–15)
BACTERIA BLD CULT: NO GROWTH
BACTERIA BLD CULT: NO GROWTH
BUN SERPL-MCNC: 10.7 MG/DL (ref 8–23)
CALCIUM SERPL-MCNC: 7.5 MG/DL (ref 8.8–10.2)
CHLORIDE SERPL-SCNC: 108 MMOL/L (ref 98–107)
CREAT SERPL-MCNC: 1.21 MG/DL (ref 0.67–1.17)
DEPRECATED HCO3 PLAS-SCNC: 18 MMOL/L (ref 22–29)
ERYTHROCYTE [DISTWIDTH] IN BLOOD BY AUTOMATED COUNT: 17.1 % (ref 10–15)
GFR SERPL CREATININE-BSD FRML MDRD: 63 ML/MIN/1.73M2
GLUCOSE BLDC GLUCOMTR-MCNC: 170 MG/DL (ref 70–99)
GLUCOSE BLDC GLUCOMTR-MCNC: 180 MG/DL (ref 70–99)
GLUCOSE BLDC GLUCOMTR-MCNC: 196 MG/DL (ref 70–99)
GLUCOSE BLDC GLUCOMTR-MCNC: 197 MG/DL (ref 70–99)
GLUCOSE BLDC GLUCOMTR-MCNC: 221 MG/DL (ref 70–99)
GLUCOSE SERPL-MCNC: 175 MG/DL (ref 70–99)
HCT VFR BLD AUTO: 26.5 % (ref 40–53)
HGB BLD-MCNC: 8.6 G/DL (ref 13.3–17.7)
MAGNESIUM SERPL-MCNC: 2.1 MG/DL (ref 1.7–2.3)
MCH RBC QN AUTO: 27.7 PG (ref 26.5–33)
MCHC RBC AUTO-ENTMCNC: 32.5 G/DL (ref 31.5–36.5)
MCV RBC AUTO: 86 FL (ref 78–100)
PHOSPHATE SERPL-MCNC: 1.9 MG/DL (ref 2.5–4.5)
PLATELET # BLD AUTO: 519 10E3/UL (ref 150–450)
POTASSIUM SERPL-SCNC: 3.1 MMOL/L (ref 3.4–5.3)
POTASSIUM SERPL-SCNC: 3.6 MMOL/L (ref 3.4–5.3)
RBC # BLD AUTO: 3.1 10E6/UL (ref 4.4–5.9)
SODIUM SERPL-SCNC: 136 MMOL/L (ref 136–145)
WBC # BLD AUTO: 11.4 10E3/UL (ref 4–11)

## 2023-02-08 PROCEDURE — 99232 SBSQ HOSP IP/OBS MODERATE 35: CPT | Performed by: STUDENT IN AN ORGANIZED HEALTH CARE EDUCATION/TRAINING PROGRAM

## 2023-02-08 PROCEDURE — 250N000013 HC RX MED GY IP 250 OP 250 PS 637: Performed by: INTERNAL MEDICINE

## 2023-02-08 PROCEDURE — 97161 PT EVAL LOW COMPLEX 20 MIN: CPT | Mod: GP | Performed by: PHYSICAL THERAPIST

## 2023-02-08 PROCEDURE — 250N000013 HC RX MED GY IP 250 OP 250 PS 637: Performed by: HOSPITALIST

## 2023-02-08 PROCEDURE — 250N000011 HC RX IP 250 OP 636: Performed by: HOSPITALIST

## 2023-02-08 PROCEDURE — 120N000001 HC R&B MED SURG/OB

## 2023-02-08 PROCEDURE — 250N000013 HC RX MED GY IP 250 OP 250 PS 637: Performed by: STUDENT IN AN ORGANIZED HEALTH CARE EDUCATION/TRAINING PROGRAM

## 2023-02-08 PROCEDURE — 97116 GAIT TRAINING THERAPY: CPT | Mod: GP | Performed by: PHYSICAL THERAPIST

## 2023-02-08 PROCEDURE — 84100 ASSAY OF PHOSPHORUS: CPT | Performed by: STUDENT IN AN ORGANIZED HEALTH CARE EDUCATION/TRAINING PROGRAM

## 2023-02-08 PROCEDURE — 84132 ASSAY OF SERUM POTASSIUM: CPT | Performed by: STUDENT IN AN ORGANIZED HEALTH CARE EDUCATION/TRAINING PROGRAM

## 2023-02-08 PROCEDURE — 80048 BASIC METABOLIC PNL TOTAL CA: CPT | Performed by: STUDENT IN AN ORGANIZED HEALTH CARE EDUCATION/TRAINING PROGRAM

## 2023-02-08 PROCEDURE — 36415 COLL VENOUS BLD VENIPUNCTURE: CPT | Performed by: STUDENT IN AN ORGANIZED HEALTH CARE EDUCATION/TRAINING PROGRAM

## 2023-02-08 PROCEDURE — 83735 ASSAY OF MAGNESIUM: CPT | Performed by: HOSPITALIST

## 2023-02-08 PROCEDURE — 258N000003 HC RX IP 258 OP 636: Performed by: INTERNAL MEDICINE

## 2023-02-08 PROCEDURE — 85027 COMPLETE CBC AUTOMATED: CPT | Performed by: STUDENT IN AN ORGANIZED HEALTH CARE EDUCATION/TRAINING PROGRAM

## 2023-02-08 PROCEDURE — 97110 THERAPEUTIC EXERCISES: CPT | Mod: GP | Performed by: PHYSICAL THERAPIST

## 2023-02-08 RX ORDER — POTASSIUM CHLORIDE 1.5 G/1.58G
40 POWDER, FOR SOLUTION ORAL ONCE
Status: COMPLETED | OUTPATIENT
Start: 2023-02-08 | End: 2023-02-08

## 2023-02-08 RX ORDER — FERROUS GLUCONATE 324(38)MG
324 TABLET ORAL
DISCHARGE
Start: 2023-02-08

## 2023-02-08 RX ORDER — SODIUM BICARBONATE 650 MG/1
1300 TABLET ORAL 2 TIMES DAILY
Status: DISCONTINUED | OUTPATIENT
Start: 2023-02-08 | End: 2023-02-09 | Stop reason: HOSPADM

## 2023-02-08 RX ORDER — PANTOPRAZOLE SODIUM 40 MG/1
40 TABLET, DELAYED RELEASE ORAL
DISCHARGE
Start: 2023-02-09

## 2023-02-08 RX ORDER — ACETAMINOPHEN 325 MG/1
650 TABLET ORAL EVERY 6 HOURS PRN
DISCHARGE
Start: 2023-02-08

## 2023-02-08 RX ADMIN — INSULIN ASPART 2 UNITS: 100 INJECTION, SOLUTION INTRAVENOUS; SUBCUTANEOUS at 18:11

## 2023-02-08 RX ADMIN — PANTOPRAZOLE SODIUM 40 MG: 40 TABLET, DELAYED RELEASE ORAL at 06:46

## 2023-02-08 RX ADMIN — POTASSIUM & SODIUM PHOSPHATES POWDER PACK 280-160-250 MG 2 PACKET: 280-160-250 PACK at 03:24

## 2023-02-08 RX ADMIN — POTASSIUM & SODIUM PHOSPHATES POWDER PACK 280-160-250 MG 2 PACKET: 280-160-250 PACK at 00:02

## 2023-02-08 RX ADMIN — ASPIRIN 81 MG: 81 TABLET, COATED ORAL at 08:55

## 2023-02-08 RX ADMIN — INSULIN ASPART 2 UNITS: 100 INJECTION, SOLUTION INTRAVENOUS; SUBCUTANEOUS at 12:52

## 2023-02-08 RX ADMIN — POTASSIUM CHLORIDE 20 MEQ: 1500 TABLET, EXTENDED RELEASE ORAL at 00:02

## 2023-02-08 RX ADMIN — HEPARIN SODIUM 5000 UNITS: 5000 INJECTION, SOLUTION INTRAVENOUS; SUBCUTANEOUS at 20:44

## 2023-02-08 RX ADMIN — LEVOTHYROXINE SODIUM 50 MCG: 50 TABLET ORAL at 06:45

## 2023-02-08 RX ADMIN — POTASSIUM CHLORIDE, DEXTROSE MONOHYDRATE AND SODIUM CHLORIDE: 150; 5; 450 INJECTION, SOLUTION INTRAVENOUS at 09:59

## 2023-02-08 RX ADMIN — SODIUM BICARBONATE 650 MG TABLET 650 MG: at 08:55

## 2023-02-08 RX ADMIN — TAMSULOSIN HYDROCHLORIDE 0.4 MG: 0.4 CAPSULE ORAL at 08:55

## 2023-02-08 RX ADMIN — INSULIN ASPART 1 UNITS: 100 INJECTION, SOLUTION INTRAVENOUS; SUBCUTANEOUS at 08:58

## 2023-02-08 RX ADMIN — POTASSIUM & SODIUM PHOSPHATES POWDER PACK 280-160-250 MG 2 PACKET: 280-160-250 PACK at 06:46

## 2023-02-08 RX ADMIN — HEPARIN SODIUM 5000 UNITS: 5000 INJECTION, SOLUTION INTRAVENOUS; SUBCUTANEOUS at 08:55

## 2023-02-08 RX ADMIN — POTASSIUM & SODIUM PHOSPHATES POWDER PACK 280-160-250 MG 2 PACKET: 280-160-250 PACK at 22:16

## 2023-02-08 RX ADMIN — POTASSIUM & SODIUM PHOSPHATES POWDER PACK 280-160-250 MG 2 PACKET: 280-160-250 PACK at 18:49

## 2023-02-08 RX ADMIN — SODIUM BICARBONATE 650 MG TABLET 1300 MG: at 20:43

## 2023-02-08 RX ADMIN — POTASSIUM CHLORIDE 40 MEQ: 1.5 POWDER, FOR SOLUTION ORAL at 10:48

## 2023-02-08 RX ADMIN — INSULIN GLARGINE 10 UNITS: 100 INJECTION, SOLUTION SUBCUTANEOUS at 08:57

## 2023-02-08 ASSESSMENT — ACTIVITIES OF DAILY LIVING (ADL)
ADLS_ACUITY_SCORE: 46
ADLS_ACUITY_SCORE: 41
ADLS_ACUITY_SCORE: 45
ADLS_ACUITY_SCORE: 46
ADLS_ACUITY_SCORE: 46
ADLS_ACUITY_SCORE: 41
ADLS_ACUITY_SCORE: 46
ADLS_ACUITY_SCORE: 46
ADLS_ACUITY_SCORE: 41
ADLS_ACUITY_SCORE: 41
ADLS_ACUITY_SCORE: 46
ADLS_ACUITY_SCORE: 46

## 2023-02-08 NOTE — PROGRESS NOTES
Pt is A&O x4, VSS on room air. Denies pain. Tele- NSR. IV D5% and 0.45%NaCL  with 20 mEq/L infusing at 100 ml/hr.Denies nausea or vomiting. Tolerating Mod Carb Diet.  Needs assistamce of 1 with use of gait belt and walker. Pt is incontinent of stool x3. Henderson catheter in place with adequate output. Potassium 3.6 and Phosphorus 1.7 replaced, Recheck at 0600.

## 2023-02-08 NOTE — PROGRESS NOTES
[facility]    Hospitalist Progress Note    Date of Service: 02/08/2023    Assessment & Plan     [place] is a 72 year old male with PMHx of chronic urinary retention with chronic ush, recurrent UTIs, BPH, chronic iron deficiency anemia and DM II who was admitted on 2/3/2023 for evaluation of dark-colored emesis concerning for GI bleed.     He was previously admitted at [facility] from 10/22/22-10/24/22 with complicated UTI (CAUTI) dt candida in setting of uncontrolled DM. ID recommended 2 weeks of fluconazole which he completed.     More recently, he was admitted at [facility] from 1/1123 - 1/18/23 for Klebsiella pneumonia UTI with associated bacteremia. Seen by ID that stay, was treated with 7 days of IV ceftriaxone and discharge on an additional 7 days of oral Ceftin. That hospital stay was also complicated by STEVE dt urinary retention that improved with Suh catheter placement and treatment of sepsis.  Was noted to have urinary retention with hydronephrosis, followed by [facility]. Ultimately discharged to TCU for ongoing therapy. Staff at his TCU noted dark colored emesis and patient was brought to ED for evaluation.      Dark-colored emesis, concern for GI bleed: Resolved  Chronic iron deficiency anemia  Cecal polyp  * On iron supplement for hx of iron deficiency anemia. Baseline hgb is 8-9, had been 10.4 per labs on 1/30/23.   * Last OP EGD/colonoscopy in 11/2022 per [facility] were notable for polyps in the colon and also in the small intestine. Had been referred to a surgeon with recommendation for possible resection of part of his small intestine. No further details of the endoscopy results or the recommended care plan at present.   * On the morning of admission, patient had received multiple medications on an empty stomach. Reported he did not like the way the iron tablet felt making him burp with stomach upset and dark-colored emesis.  * In ED, was afebrile, mildly tachycardic but BPs  stable. Hgb 10.0, WBC 17.4. Na 122. Protonix 40mg IV daily started on admission.  Plan:  -- [facility] consulted and following this stay -> EGD showed ? Coon's, bx taken. No reason for bleeding found. No sign of ongoing UGI bleed  -- no recurrence of emesis this stay and serial hgbs stable at 8-9 which is his baseline  -- cont Protonix 40mg daily  -- Transition to ferrous gluconate at discharge (fewer GI side effects)  -- was due to follow up with [facility] colorectal surgery on 2/6/23 (Dr. Micheal Pink), will need to reschedule if patient still hospitalized  - SW following for return to TCU    Suspected aspiration pneumonitis vs aspiration pneumonia  * Had episode of emesis on 2/3 AM after taking meds. No reported cp/sob/cough, fevers/chills.  * In ED, O2 sats briefly 88% on RA but quickly improved to 90s on RA. Briefly tachycardic. Viral swabs neg. CXR neg for acute infiltrate. CTA chest was neg for PE, did mention patchy predominantly groundglass opacities in both mid and lower lungs, ddx includes infectious vs inflammatory etiology. Also noted to have trace bilateral pleural effusions. ProBNP neg. WBC 17. Procal 0.16 (low risk). Given ceftriaxone and azithromycin in the ED. Changed to Zosyn on admission.   -- cont Zosyn for now given suspected concurrent UTI  -- O2 sats stable on RA -> cleared for sedation if needed for EGD  -- pulmonary toilet with IS/OOB as able     Recurrent, complicated, catheter associated urinary tract infection  Hx of UTIs, most recently dt klebsiella pneumoniae with associated klebsiella bacteremia in 1/2023  * As above, recently completed course of abx. No reported urinary complaints on presentation. Has chronic suh. Due to follow up with urology on 2/7/23.   * In ED, was afebrile. Mildly tachycardic. WBC 17 with mildly elevated procal UA abnl with large leukocyte esterase, greater than 182 WBCs. Lactate nl. Started on abx as above.   * [facility] consulted on admission dt concern  for recurrent UTIs -- no specific concerns, recommended follow up in clinic  -- urine and blood cultures drawn on admission remain neg; WBC improving  -- Stopped Zosyn for now but as cultures remain negative    STEVE, with developing NAGMA  Chronic urinary retention with Suh catheter   BPH  Hx of STEVE dt urinary retention, improved with suh catheter placement, no baseline CKD  * Baseline Cr is around 1. Cr had been as high as 3 during recent hospital stay, normalized after placement of suh. Discharged with suh in place.   * Suh catheter exchanged in ED. Cr stable on admission.  * [facility] consulted this stay, no new recs per urology's phone dicsussion with RN on 2/4. Advised to follow up with OP urology after discharge.   -- suh draining without concerns this stay  -- Cr trending up this stay despite IVFs: 1.1 -- 1.5 -- 1.7 on 2/5, did receive contrast with CT PE study on admission  -- Nephrology following -> NS with 75meq sodium bicarbonate and decreasing rate to 75ml/hr -> IVF now stopped  -- cont Flomax     Troponin elevated, likely in the setting of demand  Elevated D-dimer, no DVT/PE  * As above, was tachycardic on admission with HRs 100s. No cp/palpitations or shortness of breath.   * Incidentally found to have elevated trops this stay (HS trop 84 -- 88 on recheck). EKG showed NSR w/o acute ischemic changes. Noted to have elevated d-dimer. CT chest neg for PE.  but bilateral LE US was neg for DVT. ASA 81mg daily started on admission.   * Serial trops trended up (now 106 -- 125) but in absence of chest pain and worsening renal function make situation difficult to interpret.   * Echo obtained (suboptimal images dt technically difficult study) and showed EF 55-60% with no WMAs, grade I diastolic dysfunction; no over valve issues .  -- cont ASA 81mg daily for now  -- cont telemetry for now  -- mgmt of above medical issues     Hypochloremic hyponatremia likely from vomiting, poor intake: Resolved  * Na  124 and Cl 90 on presentation with. Started on IVFs this stay.  * Na subsequently improved  -- encourage po intake    Hypokalemia  * K 2.8 on 2/5.   -- On high replacement protocol    Constipation, dt oral iron  * CT on admission was notable for a moderate to large amount of gas and stool in the visualized portion of the colon suggestive of constipation.   * Oral iron held this stay and was placed on bowel regimen  * Had good BM on 2/5.   -- cont bowel regimen     DM II, on insulin  * A1c 6.6 in 10/2022 --> 6.6 on 2/3/23.  * PTA meds: Lantus 25U daily (had only been taking 10U daily), metformin and linagliptin   -- conts on Lantus 20U daily plus sliding scale insulin  -- had isolated episode of hypoglycemia on 2/5 AM with BG 69 which resolved -- monitor for recurrence, if low BG persists will need to scale back on insulin dosing  -- holding other meds    Recent Labs   Lab 02/08/23  1153 02/08/23  0757 02/08/23  0701 02/08/23  0316 02/07/23  2151 02/07/23  2111   * 180* 175* 197* 313* 289*       Hypothyroidism  * Recent labs showed TSH 14.8, FT4 nl but in setting of recent acute illness  * Chronic and stable on levothyroxine.   -- repeat thyroid studies in 10 wks     Physical deconditioning dt medical illness, sternal fragility  Recent fall with left temporal laceration, sutures removed on 1/16/23  Cachexia  * Had been at TCU following recent hospitalization. Noted concerns for cognitive impairment/encephalopathy.    * Oriented on admission this stay  -- anticipate return to TCU at discharge    FEN: Regular Diet  DVT Prophylaxis: subQ heparin, PCDs  Code Status: Full Code    Disposition: Anticipate discharge back to TCU when placement secured. SW following.    Parker Tesfaye MD    Medical Decision Making       -------------------------- BILLING ON TIME ------------------------------------------------------------------------------------------------------------  40 MINUTES SPENT BY ME on the date of service doing  chart review, history, exam, documentation & further activities per the note.         Interval History      Chart reviewed, overnight events noted.  No recurrent episodes of nausea/vomiting today. No cp/sob/cough.   No bloody stools  No fevers or chills    -Data reviewed today: I reviewed all new labs and imaging results over the last 24 hours. I personally reviewed no images or EKG's today.    Physical Exam   Temp: 97.9  F (36.6  C) Temp src: Oral BP: 136/66 Pulse: 70   Resp: 16 SpO2: 100 % O2 Device: None (Room air)    Vitals:    02/03/23 0910 02/04/23 0617 02/08/23 0500   Weight: 52 kg (114 lb 10.2 oz) 53 kg (116 lb 13.5 oz) 52.9 kg (116 lb 10 oz)     Vital Signs with Ranges  Temp:  [97.4  F (36.3  C)-97.9  F (36.6  C)] 97.9  F (36.6  C)  Pulse:  [] 70  Resp:  [0-20] 16  BP: (109-136)/(48-80) 136/66  SpO2:  [98 %-100 %] 100 %  I/O last 3 completed shifts:  In: 240 [P.O.:240]  Out: 2975 [Urine:2975]    Constitutional: Frail appearing male, resting comfortably, alert and answering basic questions appropriately, NAD  Respiratory: CTAB, no wheeze/rales/rhonchi, no increased work of breathing  Cardiovascular: HRRR, no MGR, no LE edema  GI: S, NT, ND, +BS  Skin/Integumen: warm/dry, previously noted forehead laceration is healing well  Other: Henderson draining clear yellow urine    Medications       aspirin  81 mg Oral Daily     heparin ANTICOAGULANT  5,000 Units Subcutaneous BID     insulin aspart  1-7 Units Subcutaneous TID AC     insulin aspart  1-5 Units Subcutaneous At Bedtime     insulin glargine  10 Units Subcutaneous QAM     levothyroxine  50 mcg Oral QAM AC     pantoprazole  40 mg Oral QAM AC     polyethylene glycol  17 g Oral Daily     senna-docusate  2 tablet Oral BID     sodium bicarbonate  1,300 mg Oral BID     sodium chloride (PF)  3 mL Intracatheter Q8H     sodium chloride (PF)  3 mL Intracatheter Q8H     tamsulosin  0.4 mg Oral Daily       Data   Recent Labs   Lab 02/08/23  1153 02/08/23  0757  02/08/23  0701 02/07/23  1721 02/07/23  1457 02/07/23  0800 02/07/23  0728 02/06/23  0804 02/06/23  0655 02/05/23  0930 02/05/23  0907 02/03/23  1544 02/03/23  0917   WBC  --   --  11.4*  --   --   --   --   --  10.5  --  13.8*   < > 17.4*   HGB  --   --  8.6*  --   --   --   --   --  8.4*  --  9.5*   < > 10.0*   MCV  --   --  86  --   --   --   --   --  82  --  86   < > 85   PLT  --   --  519*  --   --   --   --   --  503*  --  540*   < > 556*   NA  --   --  136  --   --   --  141  --  136  --  135*   < > 124*   POTASSIUM  --   --  3.1*  --  3.6  --  2.9*   < > 2.3*  --  2.8*   < > 4.0   CHLORIDE  --   --  108*  --   --   --  113*  --  104  --  102   < > 90*   CO2  --   --  18*  --   --   --  19*  --  21*  --  19*   < > 25   BUN  --   --  10.7  --   --   --  16.8  --  22.0  --  33.0*   < > 30.6*   CR  --   --  1.21*  --   --   --  1.45*  --  1.59*  --  1.70*   < > 1.14   ANIONGAP  --   --  10  --   --   --  9  --  11  --  14   < > 9   MARNI  --   --  7.5*  --   --   --  7.6*  --  7.4*  --  8.2*   < > 8.9   * 180* 175*   < >  --    < > 112*   < > 82   < > 79   < > 182*   ALBUMIN  --   --   --   --   --   --   --   --   --   --   --   --  3.0*   PROTTOTAL  --   --   --   --   --   --   --   --   --   --   --   --  7.0   BILITOTAL  --   --   --   --   --   --   --   --   --   --   --   --  0.3   ALKPHOS  --   --   --   --   --   --   --   --   --   --   --   --  90   ALT  --   --   --   --   --   --   --   --   --   --   --   --  15   AST  --   --   --   --   --   --   --   --   --   --   --   --  23   LIPASE  --   --   --   --   --   --   --   --   --   --   --   --  13    < > = values in this interval not displayed.       No results found for this or any previous visit (from the past 24 hour(s)).

## 2023-02-08 NOTE — PLAN OF CARE
Goal Outcome Evaluation:       Patient is alert and oriented X4. VSS on RA with good sat. Up with assist of 1 with gait belt and walker. On moderate carb diet with good appetite. IV dextrose 5% and 0.45% NaCl + KCl 20 mEq/L infusing 100ml/hr.   Patient denies pain. On TELE with NSR. Endo procedure done this shift. On potassium and phosphorus protocol. Next lab draw scheduled. BS monitored and no sliding insulin was given at dinner time due to  order parameter not met. Bedtime BS check with value of 289 and  insulin was given per sliding scale parameter.  Incontinent of bowel, suh catheter in place with minimum out put. Continue to monitor.

## 2023-02-08 NOTE — PROGRESS NOTES
02/08/23 1200   Appointment Info   Signing Clinician's Name / Credentials (PT) Shagufta George PT   Rehab Comments (PT) pt came from TCU   Living Environment   People in Home spouse   Current Living Arrangements house   Home Accessibility stairs to enter home   Number of Stairs, Main Entrance 7   Stair Railings, Main Entrance railings safe and in good condition   Transportation Anticipated family or friend will provide   Living Environment Comments Pt lives with spouse who has mobility issues due to MS x 4 years. She uses a walker, family is helping to care for her at home.   Self-Care   Usual Activity Tolerance good   Current Activity Tolerance fair   Regular Exercise Yes   Activity/Exercise Type   (PT at TCU)   Equipment Currently Used at Home walker, rolling   Fall history within last six months yes   Number of times patient has fallen within last six months 5   Activity/Exercise/Self-Care Comment IND at baseline   General Information   Onset of Illness/Injury or Date of Surgery 02/03/23   Referring Physician Dr Tesfaye   Patient/Family Therapy Goals Statement (PT) hopes to go home when strong enough   Pertinent History of Current Problem (include personal factors and/or comorbidities that impact the POC) Pt had hospital stay for UTI with bacteremia 1/11-1/18/23, went to TCU then readmittecd 2/3/23 with dark stools, possible GI bleed.   Existing Precautions/Restrictions fall   Cognition   Affect/Mental Status (Cognition) WFL   Orientation Status (Cognition) oriented x 4   Follows Commands (Cognition) WFL   Cognitive Status Comments pt appears cognitively intact   Pain Assessment   Patient Currently in Pain No   Integumentary/Edema   Integumentary/Edema Comments fragile skin   Posture    Posture Forward head position;Protracted shoulders   Range of Motion (ROM)   Range of Motion ROM is WFL   Strength (Manual Muscle Testing)   Strength (Manual Muscle Testing) Deficits observed during functional mobility   Bed  Mobility   Comment, (Bed Mobility) supine to sit with rail and SBA, scoots to EOB with SBA.   Transfers   Comment, (Transfers) sit to stand with walker and CGA   Gait/Stairs (Locomotion)   Distance in Feet (Gait) 60'   Comment, (Gait/Stairs) gait 25' with wh walker and CGA, slow gait with forward flexed posture, keeps knees flexed.   Balance   Balance Comments unsteady standing balance.   Sensory Examination   Sensory Perception patient reports no sensory changes   Coordination   Coordination no deficits were identified   Muscle Tone   Muscle Tone no deficits were identified   Clinical Impression   Criteria for Skilled Therapeutic Intervention Yes, treatment indicated   PT Diagnosis (PT) impaired functional mobility   Influenced by the following impairments genl deconditioning, weakness in LEs   Functional limitations due to impairments fall risk, decreased IND with mobility, inability to assist spouse at home   Clinical Presentation (PT Evaluation Complexity) Stable/Uncomplicated   Clinical Presentation Rationale Based on PMH, current status, and social support   Clinical Decision Making (Complexity) low complexity   Planned Therapy Interventions (PT) balance training;bed mobility training;gait training;stair training;strengthening;transfer training;progressive activity/exercise   Anticipated Equipment Needs at Discharge (PT)   (pt has walker)   Risk & Benefits of therapy have been explained evaluation/treatment results reviewed;care plan/treatment goals reviewed;risks/benefits reviewed;current/potential barriers reviewed;participants voiced agreement with care plan;participants included;patient   PT Total Evaluation Time   PT Eval, Low Complexity Minutes (45772) 15   Physical Therapy Goals   PT Frequency Daily   PT Predicted Duration/Target Date for Goal Attainment 02/15/23   PT Goals Bed Mobility;Transfers;Gait;Stairs   PT: Bed Mobility Modified independent;Supine to/from sit   PT: Transfers Supervision/stand-by  assist;Sit to/from stand;Assistive device   PT: Gait Supervision/stand-by assist;Assistive device;50 feet   PT: Stairs Minimal assist;7 stairs   Interventions   Interventions Quick Adds Therapeutic Activity;Gait Training;Therapeutic Procedure   Therapeutic Procedure/Exercise   Ther. Procedure: strength, endurance, ROM, flexibillity Minutes (10175) 15   Symptoms Noted During/After Treatment fatigue   Treatment Detail/Skilled Intervention PT - pt performed repeated sit to/from stand from chair x 6, difficulty d/t weakness and pain in L wrist d/t IV. Seated ex x 10-15 each incl ankle pumps, LAQ, marching and glut sets. Marta well, needed a break in between to rest. Up in chair post PT.   Gait Training   Gait Training Minutes (78681) 12   Symptoms Noted During/After Treatment (Gait Training) fatigue   Treatment Detail/Skilled Intervention PT - Pt reports no walking since admit, but has been up in chair/in room. Gait with wh walker and CGA, pt amb with forward posture, downward gaze, needs cues to stand upright but doesn't maintain. Knees flexed with gait. Fatigues quickly.   [place] Level (Gait Training) contact guard   Physical Assistance Level (Gait Training) 1 person assist   Weight Bearing (Gait Training) full weight-bearing   Assistive Device (Gait Training) rolling walker   Pattern Analysis (Gait Training) swing-to gait   Gait Analysis Deviations decreased padmini;decreased step length   Impairments (Gait Analysis/Training) balance impaired;strength decreased   PT Discharge Planning   PT Plan Transfers, standing ex, walking   PT Discharge Recommendation (DC Rec) Transitional Care Facility   PT Rationale for DC Rec Pt is below baseline, currently Ax1 for mobility. Pt came from [facility], has 7 stairs to enter his house and reports his wife cannot provide much assist (he actually cares for her as she has MS, family is assisting now). Recommend return to TCU to increase strength and functional mobility.   PT Brief  overview of current status Bed mob SBA, transfers Min A to CGA, amb CGA to min assist x1 with FWW 60'   Total Session Time   Timed Code Treatment Minutes 27   Total Session Time (sum of timed and untimed services) 42

## 2023-02-09 ENCOUNTER — APPOINTMENT (OUTPATIENT)
Dept: PHYSICAL THERAPY | Facility: CLINIC | Age: 73
DRG: 380 | End: 2023-02-09
Payer: COMMERCIAL

## 2023-02-09 VITALS
WEIGHT: 116.62 LBS | RESPIRATION RATE: 16 BRPM | OXYGEN SATURATION: 99 % | SYSTOLIC BLOOD PRESSURE: 142 MMHG | TEMPERATURE: 98.2 F | HEART RATE: 89 BPM | HEIGHT: 67 IN | BODY MASS INDEX: 18.3 KG/M2 | DIASTOLIC BLOOD PRESSURE: 64 MMHG

## 2023-02-09 LAB
ERYTHROCYTE [DISTWIDTH] IN BLOOD BY AUTOMATED COUNT: 17.3 % (ref 10–15)
GLUCOSE BLDC GLUCOMTR-MCNC: 174 MG/DL (ref 70–99)
HCT VFR BLD AUTO: 25.2 % (ref 40–53)
HGB BLD-MCNC: 8.4 G/DL (ref 13.3–17.7)
MAGNESIUM SERPL-MCNC: 1.8 MG/DL (ref 1.7–2.3)
MCH RBC QN AUTO: 28.4 PG (ref 26.5–33)
MCHC RBC AUTO-ENTMCNC: 33.3 G/DL (ref 31.5–36.5)
MCV RBC AUTO: 85 FL (ref 78–100)
PATH REPORT.COMMENTS IMP SPEC: NORMAL
PATH REPORT.COMMENTS IMP SPEC: NORMAL
PATH REPORT.FINAL DX SPEC: NORMAL
PATH REPORT.GROSS SPEC: NORMAL
PATH REPORT.MICROSCOPIC SPEC OTHER STN: NORMAL
PATH REPORT.RELEVANT HX SPEC: NORMAL
PHOSPHATE SERPL-MCNC: 2.1 MG/DL (ref 2.5–4.5)
PHOTO IMAGE: NORMAL
PLATELET # BLD AUTO: 443 10E3/UL (ref 150–450)
PLATELET # BLD AUTO: 443 10E3/UL (ref 150–450)
POTASSIUM SERPL-SCNC: 2.7 MMOL/L (ref 3.4–5.3)
RBC # BLD AUTO: 2.96 10E6/UL (ref 4.4–5.9)
WBC # BLD AUTO: 11.9 10E3/UL (ref 4–11)

## 2023-02-09 PROCEDURE — 250N000013 HC RX MED GY IP 250 OP 250 PS 637: Performed by: INTERNAL MEDICINE

## 2023-02-09 PROCEDURE — 36415 COLL VENOUS BLD VENIPUNCTURE: CPT | Performed by: STUDENT IN AN ORGANIZED HEALTH CARE EDUCATION/TRAINING PROGRAM

## 2023-02-09 PROCEDURE — 85049 AUTOMATED PLATELET COUNT: CPT | Performed by: HOSPITALIST

## 2023-02-09 PROCEDURE — 99239 HOSP IP/OBS DSCHRG MGMT >30: CPT | Performed by: STUDENT IN AN ORGANIZED HEALTH CARE EDUCATION/TRAINING PROGRAM

## 2023-02-09 PROCEDURE — 85027 COMPLETE CBC AUTOMATED: CPT | Performed by: STUDENT IN AN ORGANIZED HEALTH CARE EDUCATION/TRAINING PROGRAM

## 2023-02-09 PROCEDURE — 83735 ASSAY OF MAGNESIUM: CPT | Performed by: STUDENT IN AN ORGANIZED HEALTH CARE EDUCATION/TRAINING PROGRAM

## 2023-02-09 PROCEDURE — 97530 THERAPEUTIC ACTIVITIES: CPT | Mod: GP | Performed by: PHYSICAL THERAPIST

## 2023-02-09 PROCEDURE — 250N000013 HC RX MED GY IP 250 OP 250 PS 637: Performed by: HOSPITALIST

## 2023-02-09 PROCEDURE — 250N000013 HC RX MED GY IP 250 OP 250 PS 637: Performed by: STUDENT IN AN ORGANIZED HEALTH CARE EDUCATION/TRAINING PROGRAM

## 2023-02-09 PROCEDURE — 84100 ASSAY OF PHOSPHORUS: CPT | Performed by: STUDENT IN AN ORGANIZED HEALTH CARE EDUCATION/TRAINING PROGRAM

## 2023-02-09 PROCEDURE — 97116 GAIT TRAINING THERAPY: CPT | Mod: GP | Performed by: PHYSICAL THERAPIST

## 2023-02-09 PROCEDURE — 250N000011 HC RX IP 250 OP 636: Performed by: HOSPITALIST

## 2023-02-09 PROCEDURE — 85027 COMPLETE CBC AUTOMATED: CPT | Performed by: HOSPITALIST

## 2023-02-09 PROCEDURE — 84132 ASSAY OF SERUM POTASSIUM: CPT | Performed by: STUDENT IN AN ORGANIZED HEALTH CARE EDUCATION/TRAINING PROGRAM

## 2023-02-09 RX ORDER — INSULIN LISPRO 100 [IU]/ML
1-6 INJECTION, SOLUTION INTRAVENOUS; SUBCUTANEOUS
Qty: 15 ML | DISCHARGE
Start: 2023-02-09

## 2023-02-09 RX ORDER — POTASSIUM CHLORIDE 1.5 G/1.58G
20 POWDER, FOR SOLUTION ORAL ONCE
Status: COMPLETED | OUTPATIENT
Start: 2023-02-09 | End: 2023-02-09

## 2023-02-09 RX ORDER — POTASSIUM CHLORIDE 1500 MG/1
20 TABLET, EXTENDED RELEASE ORAL 2 TIMES DAILY
Qty: 14 TABLET | Refills: 0 | DISCHARGE
Start: 2023-02-09 | End: 2023-02-16

## 2023-02-09 RX ORDER — POTASSIUM CHLORIDE 1.5 G/1.58G
40 POWDER, FOR SOLUTION ORAL ONCE
Status: COMPLETED | OUTPATIENT
Start: 2023-02-09 | End: 2023-02-09

## 2023-02-09 RX ADMIN — POTASSIUM CHLORIDE 40 MEQ: 1.5 POWDER, FOR SOLUTION ORAL at 08:22

## 2023-02-09 RX ADMIN — INSULIN ASPART 1 UNITS: 100 INJECTION, SOLUTION INTRAVENOUS; SUBCUTANEOUS at 08:35

## 2023-02-09 RX ADMIN — POTASSIUM & SODIUM PHOSPHATES POWDER PACK 280-160-250 MG 1 PACKET: 280-160-250 PACK at 08:22

## 2023-02-09 RX ADMIN — LEVOTHYROXINE SODIUM 50 MCG: 50 TABLET ORAL at 06:36

## 2023-02-09 RX ADMIN — ASPIRIN 81 MG: 81 TABLET, COATED ORAL at 08:22

## 2023-02-09 RX ADMIN — TAMSULOSIN HYDROCHLORIDE 0.4 MG: 0.4 CAPSULE ORAL at 08:22

## 2023-02-09 RX ADMIN — HEPARIN SODIUM 5000 UNITS: 5000 INJECTION, SOLUTION INTRAVENOUS; SUBCUTANEOUS at 08:26

## 2023-02-09 RX ADMIN — POTASSIUM & SODIUM PHOSPHATES POWDER PACK 280-160-250 MG 2 PACKET: 280-160-250 PACK at 01:48

## 2023-02-09 RX ADMIN — PANTOPRAZOLE SODIUM 40 MG: 40 TABLET, DELAYED RELEASE ORAL at 06:36

## 2023-02-09 RX ADMIN — SODIUM BICARBONATE 650 MG TABLET 1300 MG: at 08:22

## 2023-02-09 RX ADMIN — POTASSIUM CHLORIDE 20 MEQ: 1.5 POWDER, FOR SOLUTION ORAL at 10:03

## 2023-02-09 ASSESSMENT — ACTIVITIES OF DAILY LIVING (ADL)
ADLS_ACUITY_SCORE: 41
ADLS_ACUITY_SCORE: 42
ADLS_ACUITY_SCORE: 41
ADLS_ACUITY_SCORE: 41

## 2023-02-09 NOTE — PLAN OF CARE
Goal Outcome Evaluation:    Admitting Diagnosis: Hyponatremia,  Generalized muscle weakness,  Acute cystitis without hematuria,  Acute respiratory failure with hypoxia,  Community acquired pneumonia, unspecified laterality.    Patient A&Ox4. VSS. On BS check 221, 196. Denied pain. On K+ 3.6, mg 2.1,  Phos protocol 1.9 replacement to be given Recheck Am. PIV SL.  Assist of 1x GB/W. Henderson in place, incontinent of BM, Stool softener held due to multiple loose stools.

## 2023-02-09 NOTE — PROGRESS NOTES
Care Management Discharge Note    Discharge Date: 02/09/2023       Discharge Disposition: Transitional Care    Discharge Services:      Discharge DME:      Discharge Transportation: family or friend will provide    Private pay costs discussed: transportation costs    PAS Confirmation Code:  N/A  Patient/family educated on Medicare website which has current facility and service quality ratings:      Education Provided on the Discharge Plan:  Yes  Persons Notified of Discharge Plans: Patient  Patient/Family in Agreement with the Plan:      Handoff Referral Completed: No    Additional Information:  SW received discharge orders and faxed to facility. SW spoke with patient who would like [facility] w/c transport and is in agreement with costs. SW scheduled  W/C transport for 1100 today. Patient/acility in agreement with discharge today via  Wheelchair transport at 1100.        CAMILA Rodriguez    [facility]

## 2023-02-09 NOTE — PROGRESS NOTES
Pt A/O x4. VSS on RA. Mod consistent carb diet. Up with A/1 GB/W. Denies pain. PIV SL. Henderson patent and in place. Incontinent BM, 2x Lg loose stools overnight, stool softener held. On K, Mg, phos protocol.

## 2023-02-09 NOTE — PLAN OF CARE
Goal Outcome Evaluation:    Admitting Diagnosis: Hyponatremia.  Generalized muscle weakness.  Acute cystitis without hematuria.  Acute respiratory failure with hypoxia (H).  Community acquired pneumonia, unspecified laterality    Patient A&Ox4. VSS. K+ 2.7, Phos 2.1 both replacement givn per RN management Protocol. Henderson in pace patent. discharge to [facility] in [place] via  Wheelchair transport. AVS printed and handed to Transportation personal.

## 2023-02-09 NOTE — DISCHARGE SUMMARY
[facility]  Hospitalist Discharge Summary      Date of Admission:  2/3/2023  Date of Discharge:  2/9/2023  Discharging Provider: Parker Tesfaye MD  Discharge Service: Hospitalist Service    Discharge Diagnoses     Dark-colored emesis  Acute on Chronic iron deficiency anemia    Follow-ups Needed After Discharge   Follow-up Appointments     Follow Up and recommended labs and tests      Follow up with retirement physician.  The following labs/tests are   recommended: CBC .             Unresulted Labs Ordered in the Past 30 Days of this Admission     Date and Time Order Name Status Description    2/7/2023  4:28 PM Surgical Pathology Exam In process         Discharge Disposition     Discharged to rehabilitation facility  Condition at discharge: Stable    Hospital Course   [place] is a 72 year old male with PMHx of chronic urinary retention with chronic suh, recurrent UTIs, BPH, chronic iron deficiency anemia and DM II who was admitted on 2/3/2023 for evaluation of dark-colored emesis concerning for GI bleed.     He was previously admitted at [facility] from 10/22/22-10/24/22 with complicated UTI (CAUTI) dt candida in setting of uncontrolled DM. ID recommended 2 weeks of fluconazole which he completed.     More recently, he was admitted at [facility] the [facility] from 1/1123 - 1/18/23 for Klebsiella pneumonia UTI with associated bacteremia. Seen by ID that stay, was treated with 7 days of IV ceftriaxone and discharge on an additional 7 days of oral Ceftin. That hospital stay was also complicated by STEVE dt urinary retention that improved with Suh catheter placement and treatment of sepsis.  Was noted to have urinary retention with hydronephrosis, followed by [facility]. Ultimately discharged to TCU for ongoing therapy. Staff at his TCU noted dark colored emesis and patient was brought to ED for evaluation.      Dark-colored emesis, concern for GI bleed: Resolved  Chronic iron deficiency anemia  Cecal polyp  *  On iron supplement for hx of iron deficiency anemia. Baseline hgb is 8-9, had been 10.4 per labs on 1/30/23.   * Last OP EGD/colonoscopy in 11/2022 per [facility] were notable for polyps in the colon and also in the small intestine. Had been referred to a surgeon with recommendation for possible resection of part of his small intestine. No further details of the endoscopy results or the recommended care plan at present.   * On the morning of admission, patient had received multiple medications on an empty stomach. Reported he did not like the way the iron tablet felt making him burp with stomach upset and dark-colored emesis.  * In ED, was afebrile, mildly tachycardic but BPs stable. Hgb 10.0, WBC 17.4. Na 122. Protonix 40mg IV daily started on admission.  Plan:  -- [facility] consulted and following this stay -> EGD showed ? Coon's, bx taken. No reason for bleeding found. No sign of ongoing UGI bleed  -- no recurrence of emesis this stay and serial hgbs stable at 8-9 which is his baseline  -- cont Protonix 40mg daily  -- Transition to ferrous gluconate at discharge (fewer GI side effects)  -- was due to follow up with [facility] colorectal surgery on 2/6/23 (Dr. Micheal Pink), will need to reschedule if patient still hospitalized  - SW following for return to TCU    Suspected aspiration pneumonitis vs aspiration pneumonia  * Had episode of emesis on 2/3 AM after taking meds. No reported cp/sob/cough, fevers/chills.  * In ED, O2 sats briefly 88% on RA but quickly improved to 90s on RA. Briefly tachycardic. Viral swabs neg. CXR neg for acute infiltrate. CTA chest was neg for PE, did mention patchy predominantly groundglass opacities in both mid and lower lungs, ddx includes infectious vs inflammatory etiology. Also noted to have trace bilateral pleural effusions. ProBNP neg. WBC 17. Procal 0.16 (low risk). Given ceftriaxone and azithromycin in the ED. Changed to Zosyn on admission.   -- cont Zosyn for now given suspected  concurrent UTI  -- O2 sats stable on RA -> cleared for sedation if needed for EGD  -- pulmonary toilet with IS/OOB as able     Recurrent, complicated, catheter associated urinary tract infection  Hx of UTIs, most recently dt klebsiella pneumoniae with associated klebsiella bacteremia in 1/2023  * As above, recently completed course of abx. No reported urinary complaints on presentation. Has chronic suh. Due to follow up with urology on 2/7/23.   * In ED, was afebrile. Mildly tachycardic. WBC 17 with mildly elevated procal UA abnl with large leukocyte esterase, greater than 182 WBCs. Lactate nl. Started on abx as above.   * [facility] consulted on admission dt concern for recurrent UTIs -- no specific concerns, recommended follow up in clinic  -- urine and blood cultures drawn on admission remain neg; WBC improving  -- Stopped Zosyn for now but as cultures remain negative    STEVE, with developing NAGMA  Chronic urinary retention with Suh catheter   BPH  Hx of STEVE dt urinary retention, improved with suh catheter placement, no baseline CKD  * Baseline Cr is around 1. Cr had been as high as 3 during recent hospital stay, normalized after placement of suh. Discharged with suh in place.   * Suh catheter exchanged in ED. Cr stable on admission.  * [facility] consulted this stay, no new recs per urology's phone dicsussion with RN on 2/4. Advised to follow up with OP urology after discharge.   -- suh draining without concerns this stay  -- Cr trending up this stay despite IVFs: 1.1 -- 1.5 -- 1.7 on 2/5, did receive contrast with CT PE study on admission  -- Nephrology following -> NS with 75meq sodium bicarbonate and decreasing rate to 75ml/hr -> IVF now stopped  -- cont Flomax     Troponin elevated, likely in the setting of demand  Elevated D-dimer, no DVT/PE  * As above, was tachycardic on admission with HRs 100s. No cp/palpitations or shortness of breath.   * Incidentally found to have elevated trops this  stay (HS trop 84 -- 88 on recheck). EKG showed NSR w/o acute ischemic changes. Noted to have elevated d-dimer. CT chest neg for PE.  but bilateral LE US was neg for DVT. ASA 81mg daily started on admission.   * Serial trops trended up (now 106 -- 125) but in absence of chest pain and worsening renal function make situation difficult to interpret.   * Echo obtained (suboptimal images dt technically difficult study) and showed EF 55-60% with no WMAs, grade I diastolic dysfunction; no over valve issues .  -- cont ASA 81mg daily for now  -- cont telemetry for now  -- mgmt of above medical issues     Hypochloremic hyponatremia likely from vomiting, poor intake: Resolved  * Na 124 and Cl 90 on presentation with. Started on IVFs this stay.  * Na subsequently improved    Hypokalemia  * K 2.8 on 2/5.   KDUR at discharge    Constipation, dt oral iron  * CT on admission was notable for a moderate to large amount of gas and stool in the visualized portion of the colon suggestive of constipation.   * Oral iron held this stay and was placed on bowel regimen  * Had good BM on 2/5.   -- cont bowel regimen     DM II, on insulin  * A1c 6.6 in 10/2022 --> 6.6 on 2/3/23.  * PTA meds: Lantus 25U daily (had only been taking 10U daily), metformin and linagliptin   -- conts on Lantus 20U daily plus sliding scale insulin  -- had isolated episode of hypoglycemia on 2/5 AM with BG 69 which resolved -- monitor for recurrence, if low BG persists will need to scale back on insulin dosing  -- holding other meds    Recent Labs   Lab 02/08/23  1153 02/08/23  0757 02/08/23  0701 02/08/23  0316 02/07/23  2151 02/07/23  2111   * 180* 175* 197* 313* 289*       Hypothyroidism  * Recent labs showed TSH 14.8, FT4 nl but in setting of recent acute illness  * Chronic and stable on levothyroxine.   -- repeat thyroid studies in 10 wks     Physical deconditioning dt medical illness, sternal fragility  Recent fall with left temporal laceration, sutures  removed on 1/16/23  Cachexia  * Had been at TCU following recent hospitalization. Noted concerns for cognitive impairment/encephalopathy.    * Oriented on admission this stay    Consultations This Hospital Stay   GASTROENTEROLOGY IP CONSULT  UROLOGY IP CONSULT  CARE MANAGEMENT / SOCIAL WORK IP CONSULT  NEPHROLOGY IP CONSULT  PHYSICAL THERAPY ADULT IP CONSULT  PHYSICAL THERAPY ADULT IP CONSULT  OCCUPATIONAL THERAPY ADULT IP CONSULT    Code Status   Full Code    Time Spent on this Encounter   I, Parker Tesfaye MD, personally saw the patient today and spent greater than 30 minutes discharging this patient.       Parker Tesfaye MD  [facility]  [address] [facility] [address]  Phone: [phone]  Fax: [phone]  ______________________________________________________________________    Physical Exam   Vital Signs: Temp: 98.2  F (36.8  C) Temp src: Oral BP: (!) 142/64 Pulse: 89   Resp: 16 SpO2: 99 % O2 Device: None (Room air)    Weight: 116 lbs 9.97 oz  ----------------------------------------------------------------------------------------       Primary Care Physician   Mallory Davila    Discharge Orders      General info for SNF    Length of Stay Estimate: Short Term Care: Estimated # of Days <30  Condition at Discharge: Stable  Level of care:skilled   Rehabilitation Potential: Fair  Admission H&P remains valid and up-to-date: Yes  Recent Chemotherapy: N/A  Use Nursing Home Standing Orders: Yes     Mantoux instructions    Give two-step Mantoux (PPD) Per Facility Policy Yes     Follow Up and recommended labs and tests    Follow up with half-way physician.  The following labs/tests are recommended: CBC .     Reason for your hospital stay    You had abnormal blood (hemoglobin) levels concerning for blood loss.     Intake and output    Every shift     Daily weights    Call Provider for weight gain of more than 2 pounds per day or 5 pounds per week.     Glucose monitor nursing  POCT    Before meals and at bedtime     Activity - Up with assistive device     Full Code     Physical Therapy Adult Consult    Evaluate and treat as clinically indicated.    Reason:  physical deconditioning     Occupational Therapy Adult Consult    Evaluate and treat as clinically indicated.    Reason:  physical deconditioning     Fall precautions     Diet    Follow this diet upon discharge: Orders Placed This Encounter      Moderate Consistent Carb (60 g CHO per Meal) Diet       Significant Results and Procedures   Most Recent 3 CBC's:  Recent Labs   Lab Test 02/09/23  0640 02/08/23  0701 02/06/23  0655   WBC 11.9* 11.4* 10.5   HGB 8.4* 8.6* 8.4*   MCV 85 86 82     443 519* 503*     Most Recent 3 BMP's:  Recent Labs   Lab Test 02/09/23  0815 02/09/23  0640 02/08/23  2152 02/08/23  1657 02/08/23  1432 02/08/23  0757 02/08/23  0701 02/07/23  0800 02/07/23  0728 02/06/23  0804 02/06/23  0655   NA  --   --   --   --   --   --  136  --  141  --  136   POTASSIUM  --  2.7*  --   --  3.6  --  3.1*   < > 2.9*   < > 2.3*   CHLORIDE  --   --   --   --   --   --  108*  --  113*  --  104   CO2  --   --   --   --   --   --  18*  --  19*  --  21*   BUN  --   --   --   --   --   --  10.7  --  16.8  --  22.0   CR  --   --   --   --   --   --  1.21*  --  1.45*  --  1.59*   ANIONGAP  --   --   --   --   --   --  10  --  9  --  11   MARNI  --   --   --   --   --   --  7.5*  --  7.6*  --  7.4*   *  --  170* 196*  --    < > 175*   < > 112*   < > 82    < > = values in this interval not displayed.     Most Recent 2 LFT's:  Recent Labs   Lab Test 02/03/23  0917 01/11/23  2100   AST 23 61*   ALT 15 74*   ALKPHOS 90 187*   BILITOTAL 0.3 1.1     Most Recent 3 INR's:  Recent Labs   Lab Test 09/30/22  1029   INR 1.20*     Most Recent 3 Troponin's:No lab results found.  Most Recent 3 BNP's:  Recent Labs   Lab Test 02/03/23  0917   NTBNPI 289   ,   Results for orders placed or performed during the hospital encounter of 02/03/23    Chest XR,  PA & LAT    Narrative    XR CHEST 2 VIEWS 2/3/2023 10:51 AM    HISTORY: new hypoxia    COMPARISON: 11/25/2022      Impression    IMPRESSION: No focal infiltrate, pleural effusion or pneumothorax.  Normal heart size.    ATIF JUÁREZ MD         SYSTEM ID:  [id number]   CT Chest Pulmonary Embolism w Contrast    Narrative    CT CHEST PULMONARY EMBOLISM With CONTRAST 2/3/2023 1:45 PM    CLINICAL HISTORY: Hypoxia. Tachycardia.  TECHNIQUE: CT angiogram chest during arterial phase injection IV  contrast. 2D and 3D MIP reconstructions were performed by the CT  technologist. Dose reduction techniques were used.   CONTRAST: 55mL [id number]  COMPARISON: CT of the chest, abdomen, and pelvis performed 11/25/2022.    FINDINGS:  ANGIOGRAM CHEST: Pulmonary arteries are normal caliber and negative  for pulmonary emboli. Thoracic aorta is negative for dissection. No CT  evidence of right heart strain.    LUNGS AND PLEURA: Trace amount of pleural fluid bilaterally. A few  small calcified granulomas bilaterally. There is patchy predominantly  groundglass opacities in both mid and lower lungs, new since the  previous exam, and most likely infectious or inflammatory. Mild  atelectasis at both lung bases posteriorly. Mild bronchial wall  thickening in both lower lobes of the lung.    MEDIASTINUM/AXILLAE: No enlarged lymph nodes in the chest. No  pericardial effusion.    CORONARY ARTERY CALCIFICATION: Moderate.    UPPER ABDOMEN: Small hiatal hernia. Moderate to large amount of gas  and stool in the visualized portion of the colon. Limited views of the  upper abdomen are otherwise unremarkable.    MUSCULOSKELETAL: Unremarkable.      Impression    IMPRESSION:  1.  No evidence for pulmonary embolism.  2.  Patchy predominantly groundglass opacities in both mid and lower  lungs are most likely infectious or inflammatory in etiology.  3.  Trace bilateral pleural effusions.  4.  Moderate to large amount of gas and stool in the  visualized  portion of the colon suggests constipation.    BYRON FRENCH MD         SYSTEM ID:  [id number]   US Lower Extremity Venous Duplex Bilateral    Narrative    EXAM: US LOWER EXTREMITY VENOUS DUPLEX BILATERAL  LOCATION: [facility]  DATE/TIME: 2/3/2023 5:01 PM    INDICATION: elevated d dimer   rule out DVT  COMPARISON: None.  TECHNIQUE: Venous Duplex ultrasound of bilateral lower extremities with and without compression, augmentation and duplex. Color flow and spectral Doppler with waveform analysis performed.    FINDINGS: Exam includes the common femoral, femoral, popliteal veins as well as segmentally visualized deep calf veins and greater saphenous vein.     RIGHT: No deep vein thrombosis. No superficial thrombophlebitis. No popliteal cyst.    LEFT: No deep vein thrombosis. No superficial thrombophlebitis. No popliteal cyst.      Impression    IMPRESSION:  1.  No deep venous thrombosis in the bilateral lower extremities.   Echocardiogram Complete     Value    LVEF  55-60%    Narrative    [id number]  [id number]  [id number]  [id number]^SEAN^BRIELLE     [facility]  Echocardiography Laboratory  [address] [place] [address]     Name: RONALD JONES  MRN: [id number]  : 1950  Study Date: 2023 11:24 AM  Age: 72 yrs  Gender: Male  Patient Location: [facility]  Reason For Study: Elevated troponin with elevated D-dimer.  Ordering Physician: BRIELLE ESTRADA  Referring Physician: BRIELLE ESTRADA  Performed By: KAI Vargas     BSA: 1.6 m2  Height: 67 in  Weight: 116 lb  HR: 89  BP: 105/58 mmHg  ______________________________________________________________________________  Procedure  (Suboptimal images, abbreviated study performed). Optison (NDC #[id number])  given intravenously.  ______________________________________________________________________________  Interpretation Summary     Technically very difficultr imaging, even with use of  contrast.  No regional wall motion abnormalities noted.  Left ventricular systolic function is normal.  The visual ejection fraction is 55-60%.  The left ventricle is normal in size.  The right ventricle is normal in structure, function and size.  There is no pericardial effusion  Grossly limited Doppler study did not demonstrate significant stenosis or  insufficiency involving cardiac valves     No oold studies availble for comparison  ______________________________________________________________________________  Left Ventricle  The left ventricle is normal in size. There is normal left ventricular wall  thickness. Left ventricular systolic function is normal. The visual ejection  fraction is 55-60%. Grade I or early diastolic dysfunction. No regional wall  motion abnormalities noted.     Right Ventricle  The right ventricle is normal in structure, function and size. There is no  mass or thrombus in the right ventricle.     Atria  Normal left atrial size. Right atrium not well visualized. The left atrial  appendage is not well visualized.     Mitral Valve  There is no mitral annular calcification. There is no mitral regurgitation  noted. There is no mitral valve stenosis.     Tricuspid Valve  The tricuspid valve is not well visualized.     Aortic Valve  The aortic valve is not well visualized.     Pulmonic Valve  The pulmonic valve is not well visualized.     Vessels  The aortic root is normal size. Inferior vena cava not well visualized for  estimation of right atrial pressure.     Pericardium  There is no pericardial effusion.     Rhythm  Sinus rhythm was noted.  ______________________________________________________________________________  MMode/2D Measurements & Calculations  Ao root diam: 3.5 cm     LVOT diam: 2.5 cm  LVOT area: 4.8 cm2     Doppler Measurements & Calculations  MV E max sandra: 42.1 cm/sec  MV A max sandra: 56.6 cm/sec  MV E/A: 0.74  MV max P.57 mmHg  MV mean P.26 mmHg  MV V2 VTI: 11.4  cm  MV dec time: 0.26 sec  Pulm Sys Tien: 38.4 cm/sec  Pulm Squires Tien: 33.5 cm/sec  Pulm A Revs Tien: 30.6 cm/sec  Pulm S/D: 1.1  E/E' av.9  Lateral E/e': 4.5  Medial E/e': 7.2     ______________________________________________________________________________  Report approved by: Dr. Bucky Ordonez 2023 12:18 PM               Discharge Medications   Discharge Medication List as of 2023 10:07 AM      START taking these medications    Details   acetaminophen (TYLENOL) 325 MG tablet Take 2 tablets (650 mg) by mouth every 6 hours as needed for mild pain or other (and adjunct with moderate or severe pain or per patient request), Transitional      aspirin (ASA) 81 MG EC tablet Take 1 tablet (81 mg) by mouth daily, Transitional      ferrous gluconate (FERGON) 324 (38 Fe) MG tablet Take 1 tablet (324 mg) by mouth daily (with breakfast), Transitional      insulin lispro (HUMALOG KWIKPEN) 100 UNIT/ML (1 unit dial) KWIKPEN Inject 1-6 Units Subcutaneous 3 times daily (before meals) Correction Scale - MEDIUM INSULIN RESISTANCE DOSING     Do Not give Correction Insulin if Pre-Meal BG less than 140.   For Pre-Meal  - 189 give 1 unit.   For Pre-Meal  - 239 give 2 un its.   For Pre-Meal  - 289 give 3 units.   For Pre-Meal  - 339 give 4 units.   For Pre-Meal - 399 give 5 units.   For Pre-Meal -449 give 6 units  For Pre-Meal BG greater than or equal to 450 give 7 units.   To be given with prandi al insulin, and based on pre-meal blood glucose.    Notify provider if glucose greater than or equal to 350 mg/dL after administration of correction dose.  If given at mealtime, administer within 30 minutes of start of meal., Disp-15 mL, Transitional      pantoprazole (PROTONIX) 40 MG EC tablet Take 1 tablet (40 mg) by mouth every morning (before breakfast), Transitional         CONTINUE these medications which have CHANGED    Details   insulin glargine (LANTUS PEN) 100 UNIT/ML pen Inject 15  Units Subcutaneous every morning, Disp-15 mL, TransitionalIf Lantus is not covered by insurance, may substitute Basaglar or Semglee or other insulin glargine product per insurance preference at same dose and frequency.           CONTINUE these medications which have NOT CHANGED    Details   levothyroxine (SYNTHROID/LEVOTHROID) 50 MCG tablet Take 50 mcg by mouth daily, Historical      linagliptin (TRADJENTA) 5 MG TABS tablet Take 5 mg by mouth daily, Historical      metFORMIN (GLUCOPHAGE XR) 500 MG 24 hr tablet Give 1000mg in the morning and 500mg in th evening, with meals., Historical      ONE TOUCH ULTRA TEST VI STRP as directed Disp-100, R-6Fax      polyethylene glycol (MIRALAX) 17 g packet Take 1 packet by mouth daily, Historical      senna-docusate (SENOKOT-S/PERICOLACE) 8.6-50 MG tablet Take 1 tablet by mouth 2 times daily . Hold for loose stools., Historical      tamsulosin (FLOMAX) 0.4 MG capsule Take 1 capsule (0.4 mg) by mouth daily, Disp-30 capsule, R-0, E-Prescribe         STOP taking these medications       ferrous sulfate (FEROSUL) 325 (65 Fe) MG tablet Comments:   Reason for Stopping:             Allergies   No Known Allergies

## 2023-02-10 ENCOUNTER — PATIENT OUTREACH (OUTPATIENT)
Dept: CARE COORDINATION | Facility: CLINIC | Age: 73
End: 2023-02-10
Payer: COMMERCIAL

## 2023-02-10 NOTE — PROGRESS NOTES
[facility]    Background: Transitional Care Management program identified per system criteria and reviewed by [facility] team for possible outreach.    Assessment: Upon chart review, CCR[facility] Team member will not proceed with patient outreach related to this episode of Transitional Care Management program due to reason below:    Non-[facility] TCU: CCRC team member noted patient discharged to TCU/ARU/LTACH. Patient is not established with a [facility] currently supported by [facility]-Care Coordination therefore handoff to Primary Care-Care Coordination is not appropriate at this time.         Plan: Transitional Care Management episode addressed appropriately per reason noted above.      AYESHA Wright  [facility] [place], [facility]    *Connected Care Resource Team does NOT follow patient ongoing. Referrals are identified based on internal discharge reports and the outreach is to ensure patient has an understanding of their discharge instructions.

## 2023-02-11 ENCOUNTER — LAB REQUISITION (OUTPATIENT)
Dept: LAB | Facility: CLINIC | Age: 73
End: 2023-02-11
Payer: COMMERCIAL

## 2023-02-11 DIAGNOSIS — D64.9 ANEMIA, UNSPECIFIED: ICD-10-CM

## 2023-02-11 DIAGNOSIS — E87.1 HYPO-OSMOLALITY AND HYPONATREMIA: ICD-10-CM

## 2023-02-13 ENCOUNTER — LAB REQUISITION (OUTPATIENT)
Dept: LAB | Facility: CLINIC | Age: 73
End: 2023-02-13
Payer: COMMERCIAL

## 2023-02-13 ENCOUNTER — DOCUMENTATION ONLY (OUTPATIENT)
Dept: OTHER | Facility: CLINIC | Age: 73
End: 2023-02-13
Payer: COMMERCIAL

## 2023-02-13 DIAGNOSIS — R19.7 DIARRHEA, UNSPECIFIED: ICD-10-CM

## 2023-02-13 LAB
ANION GAP SERPL CALCULATED.3IONS-SCNC: 10 MMOL/L (ref 7–15)
BUN SERPL-MCNC: 20.4 MG/DL (ref 8–23)
C DIFF TOX B STL QL: NEGATIVE
CALCIUM SERPL-MCNC: 8.5 MG/DL (ref 8.8–10.2)
CHLORIDE SERPL-SCNC: 110 MMOL/L (ref 98–107)
CREAT SERPL-MCNC: 1.16 MG/DL (ref 0.67–1.17)
DEPRECATED HCO3 PLAS-SCNC: 18 MMOL/L (ref 22–29)
GFR SERPL CREATININE-BSD FRML MDRD: 67 ML/MIN/1.73M2
GLUCOSE SERPL-MCNC: 104 MG/DL (ref 70–99)
HGB BLD-MCNC: 7.8 G/DL (ref 13.3–17.7)
POTASSIUM SERPL-SCNC: 3.5 MMOL/L (ref 3.4–5.3)
SODIUM SERPL-SCNC: 138 MMOL/L (ref 136–145)

## 2023-02-13 PROCEDURE — 85018 HEMOGLOBIN: CPT | Mod: ORL | Performed by: NURSE PRACTITIONER

## 2023-02-13 PROCEDURE — 87493 C DIFF AMPLIFIED PROBE: CPT | Mod: ORL | Performed by: NURSE PRACTITIONER

## 2023-02-13 PROCEDURE — 80048 BASIC METABOLIC PNL TOTAL CA: CPT | Mod: ORL | Performed by: NURSE PRACTITIONER

## 2023-02-13 PROCEDURE — 36415 COLL VENOUS BLD VENIPUNCTURE: CPT | Mod: ORL | Performed by: NURSE PRACTITIONER

## 2023-02-13 PROCEDURE — P9603 ONE-WAY ALLOW PRORATED MILES: HCPCS | Mod: ORL | Performed by: NURSE PRACTITIONER

## 2023-02-16 ENCOUNTER — LAB REQUISITION (OUTPATIENT)
Dept: LAB | Facility: CLINIC | Age: 73
End: 2023-02-16
Payer: COMMERCIAL

## 2023-02-16 DIAGNOSIS — D64.9 ANEMIA, UNSPECIFIED: ICD-10-CM

## 2023-02-17 LAB — HGB BLD-MCNC: 8 G/DL (ref 13.3–17.7)

## 2023-02-17 PROCEDURE — P9603 ONE-WAY ALLOW PRORATED MILES: HCPCS | Mod: ORL | Performed by: NURSE PRACTITIONER

## 2023-02-17 PROCEDURE — 36415 COLL VENOUS BLD VENIPUNCTURE: CPT | Mod: ORL | Performed by: NURSE PRACTITIONER

## 2023-02-17 PROCEDURE — 85018 HEMOGLOBIN: CPT | Mod: ORL | Performed by: NURSE PRACTITIONER

## 2023-02-22 ENCOUNTER — LAB REQUISITION (OUTPATIENT)
Dept: LAB | Facility: CLINIC | Age: 73
End: 2023-02-22
Payer: COMMERCIAL

## 2023-02-22 DIAGNOSIS — E87.6 HYPOKALEMIA: ICD-10-CM

## 2023-02-22 DIAGNOSIS — D64.9 ANEMIA, UNSPECIFIED: ICD-10-CM

## 2023-02-24 LAB
HGB BLD-MCNC: 9.1 G/DL (ref 13.3–17.7)
POTASSIUM SERPL-SCNC: 4.2 MMOL/L (ref 3.4–5.3)

## 2023-02-24 PROCEDURE — 85018 HEMOGLOBIN: CPT | Mod: ORL | Performed by: INTERNAL MEDICINE

## 2023-02-24 PROCEDURE — 84132 ASSAY OF SERUM POTASSIUM: CPT | Mod: ORL | Performed by: INTERNAL MEDICINE

## 2023-02-24 PROCEDURE — P9603 ONE-WAY ALLOW PRORATED MILES: HCPCS | Mod: ORL | Performed by: INTERNAL MEDICINE

## 2023-02-24 PROCEDURE — 36415 COLL VENOUS BLD VENIPUNCTURE: CPT | Mod: ORL | Performed by: INTERNAL MEDICINE

## (undated) RX ORDER — FENTANYL CITRATE 50 UG/ML
INJECTION, SOLUTION INTRAMUSCULAR; INTRAVENOUS
Status: DISPENSED
Start: 2023-02-07